# Patient Record
Sex: MALE | Race: BLACK OR AFRICAN AMERICAN | NOT HISPANIC OR LATINO | Employment: OTHER | URBAN - METROPOLITAN AREA
[De-identification: names, ages, dates, MRNs, and addresses within clinical notes are randomized per-mention and may not be internally consistent; named-entity substitution may affect disease eponyms.]

---

## 2017-01-18 PROBLEM — R20.2 NUMBNESS AND TINGLING IN LEFT HAND: Status: ACTIVE | Noted: 2017-01-18

## 2017-01-18 PROBLEM — R20.0 NUMBNESS AND TINGLING IN LEFT HAND: Status: ACTIVE | Noted: 2017-01-18

## 2017-04-20 PROBLEM — J30.2 SEASONAL ALLERGIC RHINITIS: Status: ACTIVE | Noted: 2017-04-20

## 2018-09-13 PROCEDURE — 88341 IMHCHEM/IMCYTCHM EA ADD ANTB: CPT | Performed by: PATHOLOGY

## 2018-09-13 PROCEDURE — 88342 IMHCHEM/IMCYTCHM 1ST ANTB: CPT | Performed by: PATHOLOGY

## 2018-09-13 PROCEDURE — 88365 INSITU HYBRIDIZATION (FISH): CPT | Performed by: PATHOLOGY

## 2018-09-13 PROCEDURE — 88323 CONSLTJ&REPRT MATRL PREP SLD: CPT | Performed by: PATHOLOGY

## 2018-11-05 PROBLEM — C83.30 DLBCL (DIFFUSE LARGE B CELL LYMPHOMA): Status: ACTIVE | Noted: 2018-11-05

## 2018-11-06 PROBLEM — B02.9 ZOSTER: Status: ACTIVE | Noted: 2018-11-06

## 2018-11-06 PROBLEM — B02.8 HERPES ZOSTER WITH COMPLICATION: Status: ACTIVE | Noted: 2018-11-06

## 2018-12-03 ENCOUNTER — TELEPHONE (OUTPATIENT)
Dept: HEMATOLOGY/ONCOLOGY | Facility: CLINIC | Age: 28
End: 2018-12-03

## 2018-12-03 NOTE — TELEPHONE ENCOUNTER
----- Message from Ashwini Costa RN sent at 12/3/2018  8:51 AM CST -----  Regarding: FW: needs appt abilio  This patient had an appt scheduled with Dr Garcia and had to cancel.  Can you please reschedule.      Ashwini    ----- Message -----  From: Amanda Edwards RN  Sent: 12/3/2018   8:47 AM  To: [facility] Navigation, #  Subject: needs appt abilio                                 Patient recently had to cancel an appt with bone marrow transplant due to zoster's flare up. appt has not been rescheduled as of yet. Can y'all please get him rescheduled?   tolu Patel RN     Called patient and left message tocall back to reschedule

## 2018-12-03 NOTE — TELEPHONE ENCOUNTER
Called patient left message to call the office to reschedule the consult appointment with Dr Garcia. Number was provided.        ----- Message from Ashwini Costa RN sent at 12/3/2018  8:51 AM CST -----  Regarding: FW: needs appt abilio  This patient had an appt scheduled with Dr Garcia and had to cancel.  Can you please reschedule.      Ashwini    ----- Message -----  From: Amanda Edwards RN  Sent: 12/3/2018   8:47 AM  To: [facility] Cancer Navigation, #  Subject: needs appt abilio                                 Patient recently had to cancel an appt with bone marrow transplant due to zoster's flare up. appt has not been rescheduled as of yet. Can y'all please get him rescheduled?   tolu Patel RN

## 2018-12-11 ENCOUNTER — INITIAL CONSULT (OUTPATIENT)
Dept: HEMATOLOGY/ONCOLOGY | Facility: CLINIC | Age: 28
End: 2018-12-11
Payer: MEDICARE

## 2018-12-11 VITALS
BODY MASS INDEX: 23.33 KG/M2 | HEIGHT: 70 IN | HEART RATE: 70 BPM | WEIGHT: 162.94 LBS | RESPIRATION RATE: 18 BRPM | TEMPERATURE: 98 F | OXYGEN SATURATION: 99 % | DIASTOLIC BLOOD PRESSURE: 86 MMHG | SYSTOLIC BLOOD PRESSURE: 145 MMHG

## 2018-12-11 DIAGNOSIS — C83.33 DIFFUSE LARGE B-CELL LYMPHOMA OF INTRA-ABDOMINAL LYMPH NODES: Primary | ICD-10-CM

## 2018-12-11 DIAGNOSIS — B20 AIDS: ICD-10-CM

## 2018-12-11 DIAGNOSIS — B02.8 HERPES ZOSTER WITH COMPLICATION: ICD-10-CM

## 2018-12-11 PROCEDURE — 99214 OFFICE O/P EST MOD 30 MIN: CPT | Mod: PBBFAC | Performed by: INTERNAL MEDICINE

## 2018-12-11 PROCEDURE — 99205 OFFICE O/P NEW HI 60 MIN: CPT | Mod: S$PBB,,, | Performed by: INTERNAL MEDICINE

## 2018-12-11 PROCEDURE — 99999 PR PBB SHADOW E&M-EST. PATIENT-LVL IV: CPT | Mod: PBBFAC,,, | Performed by: INTERNAL MEDICINE

## 2018-12-11 NOTE — LETTER
December 17, 2018      Mary Ann Carolina MD  [address]  [place] [address]           [facility] Hematology Oncology  [address]  [facility] [address]  Phone: [phone]          Patient: Alonzo Rajput   MR Number: [id number]   YOB: 1990   Date of Visit: 12/11/2018       Dear Dr. Mary Ann Carolina:    Thank you for referring Alonzo Rajput to me for evaluation. Attached you will find relevant portions of my assessment and plan of care.    If you have questions, please do not hesitate to call me. I look forward to following Alonzo Rajput along with you.    Sincerely,    Sarahi Garcia MD    Enclosure  CC:  No Recipients    If you would like to receive this communication electronically, please contact [email or web address] or [phone] to request more information on [employer] access.    For providers and/or their staff who would like to refer a patient to [facility], please contact us through our one-stop-shop provider referral line, [facility], at [phone].    If you feel you have received this communication in error or would no longer like to receive these types of communications, please e-mail [email or web address]

## 2018-12-11 NOTE — PROGRESS NOTES
Hematology and Medical Oncology   New Patient Consult     12/11/2018  Referred by:  Dr. Mary Ann Carolina    Primary Oncologic Diagnosis: Relapsed aggressive lymphoma    History of Present Ilness:   Alonzo Rajput (Alonzo) is a pleasant 28 y.o.male who has currently receiving salvage RICE therapy. Tomorrow would start cycle 3. He presents today to discuss auto stem cell transplant options.    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   --9/11/2018 PET/CT shows Extensive hypermetabolic adenopathy involving the neck, chest, and lower abdomen/pelvis with index lesions detailed above most suggestive of lymphoma.  Also increased activity involving adenoidal and palatine tonsillar tissues may be reactive or could indicate lymphoma involvement.  There is diffuse mild increased activity within the spleen.   --BM biopsy 9/12/2018 negative for disease  --Lymph node, left inguinal, excision on 9/13/2018  --RICE x 3 has been complicated with disseminated shingles, now recovered.    PAST MEDICAL HISTORY:   Past Medical History:   Diagnosis Date    Allergy     Bronchitis     Cancer     lymphoma    Candida esophagitis     Diffuse large B cell lymphoma     DLBCL (diffuse large B cell lymphoma) 11/5/2018    Ear infection     Encounter for blood transfusion     Fever blister     Hepatitis B     HIV (human immunodeficiency virus infection)     UGI bleed        PAST SURGICAL HISTORY:   Past Surgical History:   Procedure Laterality Date    BIOPSY, LYMPH NODE Left 9/13/2018    Performed [facility]yes[facility] at [facility] OR    EGD for staging of response to chemotherapy for  DLBCL stomach N/A 8/11/2016    Performed by Luis [facility]Reyes, MD at [facility] ENDO    ESOPHAGOGASTRODUODENOSCOPY      ESOPHAGOGASTRODUODENOSCOPY (EGD) N/A 9/23/2015    Performed by Marshall Valentine MD at [facility] ENDO    INSERTION OF TUNNELED CENTRAL VENOUS CATHETER (CVC) WITH SUBCUTANEOUS PORT Left 9/13/2018    Procedure: [id number]-C-CATH;  Surgeon: Luis Bogran-Reyes, MD;  Location: [facility];  Service: General;  Laterality: Left;    CDVSGYWEM-FAFL-B-CATH Left 9/13/2018    Performed by Luis [facility]Reyes, MD at [facility] OR    [id number]-YDZS-Z-CATH Left 10/14/2015    Performed by Leno Hernandes MD at [facility] OR    LYMPH NODE BIOPSY Left 9/13/2018    Procedure: BIOPSY, LYMPH NODE;  Surgeon: Luis Bogran-Reyes, MD;  Location: [facility];  Service: General;  Laterality: Left;  inguinal lymph node excisional biopsy    port a cath removal      PORTACATH PLACEMENT      UPPER GASTROINTESTINAL ENDOSCOPY         PAST SOCIAL HISTORY:   reports that he quit smoking about 3 years ago. His smoking use included cigarettes. He quit after 2.00 years of use. he has never used smokeless tobacco. He reports that he drinks alcohol. He reports that he uses drugs. Drug: Marijuana.    FAMILY HISTORY:  Family History   Problem Relation Age of Onset    No Known Problems Mother     No Known Problems Father     No Known Problems Sister     No Known Problems Brother     Lupus Sister     No Known Problems Sister     No Known Problems Brother     No Known Problems Brother        CURRENT MEDICATIONS:   Current Outpatient Medications   Medication Sig    abacavir-dolutegravir-lamivud (TRIUMEQ) 600- mg Tab Take 1 tablet by mouth once daily.    acyclovir (ZOVIRAX) 400 MG tablet Take 1 tablet (400 mg total) by mouth 2 (two) times daily.    allopurinol (ZYLOPRIM) 300 MG tablet Take 1 tablet (300 mg total) by mouth once daily.    cetirizine (ZYRTEC) 10 MG tablet TAKE 1 TABLET BY MOUTH ONCE DAILY    citalopram (CELEXA) 20 MG tablet Take 1  tablet (20 mg total) by mouth once daily.    desonide (DESOWEN) 0.05 % cream Apply topically 2 (two) times daily. To ears, face and flexures BID for two weeks, then prn severe flares only for 10 days    dexamethasone (DECADRON) 4 MG Tab Take 1 tablet (4 mg total) by mouth every 6 (six) hours. Take with 2 Reglan pills as needed for nausea.    diphenhydrAMINE (BENADRYL) 25 mg capsule Take 1 each (25 mg total) by mouth every 6 (six) hours as needed for Allergies (sinus congestion).    metoclopramide HCl (REGLAN) 10 MG tablet Take 2 tablets (20 mg total) by mouth 4 (four) times daily before meals and nightly.    omeprazole (PRILOSEC) 20 MG capsule Take 1 capsule (20 mg total) by mouth once daily.    ondansetron (ZOFRAN) 4 MG tablet Take 1 tablet (4 mg total) by mouth every 6 (six) hours as needed for Nausea.    prochlorperazine (COMPAZINE) 10 MG tablet Take 1 tablet (10 mg total) by mouth 4 (four) times daily as needed.    sulfamethoxazole-trimethoprim 800-160mg (BACTRIM DS) 800-160 mg Tab Take 1 tablet by mouth once daily.    traZODone (DESYREL) 100 MG tablet Take one half hs for one week then one hs (Patient taking differently: Take 100 mg by mouth every evening. Take one half hs for one week then one hs)     No current facility-administered medications for this visit.      ALLERGIES:   Review of patient's allergies indicates:   Allergen Reactions    Amoxicillin Swelling         Review of Systems:     Review of Systems   Constitutional: Negative for appetite change, chills, diaphoresis, fatigue, fever and unexpected weight change.   HENT:   Negative for hearing loss, mouth sores, nosebleeds, sore throat, trouble swallowing and voice change.    Eyes: Negative for eye problems and icterus.   Respiratory: Negative for chest tightness, cough, hemoptysis, shortness of breath and wheezing.    Cardiovascular: Negative for chest pain, leg swelling and palpitations.   Gastrointestinal: Negative for abdominal  distention, abdominal pain, blood in stool, diarrhea, nausea and vomiting.   Endocrine: Negative for hot flashes.   Genitourinary: Negative for bladder incontinence, difficulty urinating, dysuria and hematuria.    Musculoskeletal: Negative for arthralgias, back pain, flank pain, gait problem, myalgias, neck pain and neck stiffness.   Skin: Negative for itching, rash and wound.   Neurological: Negative for dizziness, extremity weakness, gait problem, headaches, numbness, seizures and speech difficulty.   Hematological: Negative for adenopathy. Does not bruise/bleed easily.   Psychiatric/Behavioral: Negative for confusion, depression and sleep disturbance. The patient is not nervous/anxious.           Physical Exam:     Vitals:    12/11/18 1426   BP: (!) 145/86   Pulse: 70   Resp: 18   Temp: 97.9 °F (36.6 °C)       Physical Exam   Constitutional: He is oriented to person, place, and time. He appears well-developed and well-nourished. No distress.   HENT:   Head: Normocephalic and atraumatic.   Mouth/Throat: Oropharynx is clear and moist. No oropharyngeal exudate.   Eyes: Conjunctivae and EOM are normal. Pupils are equal, round, and reactive to light.   Neck: Normal range of motion. Neck supple. No JVD present. No tracheal deviation present. No thyromegaly present.   Cardiovascular: Normal rate, regular rhythm and normal heart sounds. Exam reveals no friction rub.   No murmur heard.  Pulmonary/Chest: Effort normal and breath sounds normal. No stridor. No respiratory distress. He has no wheezes. He has no rales. He exhibits no tenderness.   Abdominal: Soft. Bowel sounds are normal. He exhibits no distension. There is no tenderness. There is no rebound and no guarding.   Musculoskeletal: Normal range of motion. He exhibits no edema, tenderness or deformity.   Lymphadenopathy:     He has no axillary adenopathy.   Neurological: He is alert and oriented to person, place, and time. He displays normal reflexes. No cranial  nerve deficit or sensory deficit. He exhibits normal muscle tone. Coordination normal.   Skin: Skin is warm and dry. Capillary refill takes less than 2 seconds. No rash noted. He is not diaphoretic. No erythema. No pallor.   Psychiatric: He has a normal mood and affect. His behavior is normal. Judgment and thought content normal.       ECOG Performance Status: (foot note - ECOG PS provided by Eastern Cooperative Oncology Group) 0 - Asymptomatic    Karnofsky Performance Score:  100%- Normal, No Complaints, No Evidence of Disease    Labs:   Lab Results   Component Value Date    WBC 13.94 (H) 12/14/2018    HGB 9.3 (L) 12/14/2018    HCT 27.7 (L) 12/14/2018    PLT 87 (L) 12/14/2018    CHOL 83 (L) 11/20/2017    TRIG 60 11/20/2017    HDL 25 (L) 11/20/2017    ALT 68 (H) 12/14/2018    AST 51 (H) 12/14/2018     12/14/2018    K 3.5 12/14/2018     12/14/2018    CREATININE 0.9 12/14/2018    BUN 13 12/14/2018    CO2 27 12/14/2018    TSH 1.136 09/23/2015    INR 1.1 11/05/2018    HGBA1C 5.2 11/29/2018         Imaging: Previous imaging has been reviewed    Assessment and Plan:     Mr. 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 Villa at [facility]  --He understands that he would need to live in the [place] area for roughly 30 days following ASCT, he has a care giver and this is not an issue      HIV  --Currently on Triumeq with good disease control  --Previously on and off HARRT therapy, we discussed the importance of medication adherence      60 minutes were spent face  to face with the patient to discuss the disease, natural history, treatment options and survival statistics. I have provided the patient with an opportunity to ask questions and have all questions answered to his satisfaction.       he will return to clinic as per the transplant coordinator's schedule, but knows to call in the interim if symptoms change or should a problem arise.        Sarahi Garcia MD  Hematology and Medical Oncology  Bone Marrow Transplant  [facility]

## 2018-12-12 ENCOUNTER — TELEPHONE (OUTPATIENT)
Dept: HEMATOLOGY/ONCOLOGY | Facility: CLINIC | Age: 28
End: 2018-12-12

## 2018-12-12 NOTE — TELEPHONE ENCOUNTER
spoke with pt on today in regards to upcoming appointment on Saturdays for pet scan, pt is aware and has confirm.

## 2018-12-14 ENCOUNTER — TELEPHONE (OUTPATIENT)
Dept: HEMATOLOGY/ONCOLOGY | Facility: CLINIC | Age: 28
End: 2018-12-14

## 2018-12-14 NOTE — TELEPHONE ENCOUNTER
Called Alonzo and gave him the phone # for [facility].  I told him that I faxed over the clearance letter and told them to call me.  All questions were answered and patient verbalized understanding.  Nahid MPH, MT (ASCP).

## 2018-12-15 ENCOUNTER — HOSPITAL ENCOUNTER (OUTPATIENT)
Dept: RADIOLOGY | Facility: HOSPITAL | Age: 28
Discharge: HOME | End: 2018-12-15
Attending: INTERNAL MEDICINE
Payer: MEDICARE

## 2018-12-15 DIAGNOSIS — C83.33 DIFFUSE LARGE B-CELL LYMPHOMA OF INTRA-ABDOMINAL LYMPH NODES: ICD-10-CM

## 2018-12-15 LAB — POCT GLUCOSE: 88 MG/DL (ref 70–110)

## 2018-12-15 PROCEDURE — 78815 PET IMAGE W/CT SKULL-THIGH: CPT | Mod: 26,PS,, | Performed by: RADIOLOGY

## 2018-12-15 PROCEDURE — A9552 F18 FDG: HCPCS

## 2018-12-15 PROCEDURE — 78815 PET IMAGE W/CT SKULL-THIGH: CPT | Mod: TC

## 2018-12-17 PROBLEM — Z86.19 HISTORY OF HERPES ZOSTER: Status: ACTIVE | Noted: 2018-12-17

## 2018-12-18 ENCOUNTER — PATIENT MESSAGE (OUTPATIENT)
Dept: HEMATOLOGY/ONCOLOGY | Facility: CLINIC | Age: 28
End: 2018-12-18

## 2018-12-18 ENCOUNTER — TELEPHONE (OUTPATIENT)
Dept: HEMATOLOGY/ONCOLOGY | Facility: CLINIC | Age: 28
End: 2018-12-18

## 2019-01-14 ENCOUNTER — TELEPHONE (OUTPATIENT)
Dept: HEMATOLOGY/ONCOLOGY | Facility: CLINIC | Age: 29
End: 2019-01-14

## 2019-01-15 ENCOUNTER — TELEPHONE (OUTPATIENT)
Dept: HEMATOLOGY/ONCOLOGY | Facility: CLINIC | Age: 29
End: 2019-01-15

## 2019-01-16 ENCOUNTER — TELEPHONE (OUTPATIENT)
Dept: HEMATOLOGY/ONCOLOGY | Facility: CLINIC | Age: 29
End: 2019-01-16

## 2019-01-24 ENCOUNTER — TELEPHONE (OUTPATIENT)
Dept: HEMATOLOGY/ONCOLOGY | Facility: CLINIC | Age: 29
End: 2019-01-24

## 2019-01-24 NOTE — TELEPHONE ENCOUNTER
[facility] has called the patient and he has an appt for 2/6.  I told Alonzo to call me after his visit.  All questions were answered and patient verbalized understanding.  Nahid MPH, MT(ASCP).

## 2019-02-08 ENCOUNTER — TELEPHONE (OUTPATIENT)
Dept: HEMATOLOGY/ONCOLOGY | Facility: CLINIC | Age: 29
End: 2019-02-08

## 2019-02-11 ENCOUNTER — TELEPHONE (OUTPATIENT)
Dept: HEMATOLOGY/ONCOLOGY | Facility: CLINIC | Age: 29
End: 2019-02-11

## 2019-02-11 NOTE — TELEPHONE ENCOUNTER
Spoke to Alonzo this morning.  He stated that he went to the dentist on 2/6 and needs to have 7 teeth pulled which is scheduled for 2/19 at [facility].  He said he will have partial plates made after that.  I told him to call me after his dental appt to see how he made out and how long they want him to recover.  All questions were answered and patient verbalized understanding.  Nahid, MPH, MT(ASCP)

## 2019-03-01 ENCOUNTER — TELEPHONE (OUTPATIENT)
Dept: HEMATOLOGY/ONCOLOGY | Facility: CLINIC | Age: 29
End: 2019-03-01

## 2019-03-01 NOTE — TELEPHONE ENCOUNTER
Spoke to Alonzo and he will be going to his dentist on 3/6 for clearance.  I told him to give me a call after and we will get him set up to see Dr. Garcia.  Nahid, MPH, MT(ASCP).

## 2019-03-07 ENCOUNTER — TELEPHONE (OUTPATIENT)
Dept: HEMATOLOGY/ONCOLOGY | Facility: CLINIC | Age: 29
End: 2019-03-07

## 2019-03-11 ENCOUNTER — TELEPHONE (OUTPATIENT)
Dept: HEMATOLOGY/ONCOLOGY | Facility: CLINIC | Age: 29
End: 2019-03-11

## 2019-03-18 PROBLEM — B20: Status: ACTIVE | Noted: 2018-12-17

## 2019-03-18 PROBLEM — B02.9 ZOSTER: Status: RESOLVED | Noted: 2018-11-06 | Resolved: 2019-03-18

## 2019-03-18 PROBLEM — Z86.19 HISTORY OF HERPES ZOSTER: Status: RESOLVED | Noted: 2018-12-17 | Resolved: 2019-03-18

## 2019-03-19 ENCOUNTER — TELEPHONE (OUTPATIENT)
Dept: HEMATOLOGY/ONCOLOGY | Facility: CLINIC | Age: 29
End: 2019-03-19

## 2019-03-19 NOTE — TELEPHONE ENCOUNTER
Spoke to Alonzo and he is returning to the dentist today.  I will resend the fax with the letter to [facility].  I told him that he needs an appt with Dr. Garcia.  He said he was going to make one.  All questions were answered and patient verbalized understanding.  Nahid, MPH, MT (ASCP)

## 2019-03-20 ENCOUNTER — TELEPHONE (OUTPATIENT)
Dept: HEMATOLOGY/ONCOLOGY | Facility: CLINIC | Age: 29
End: 2019-03-20

## 2019-03-25 ENCOUNTER — TELEPHONE (OUTPATIENT)
Dept: HEMATOLOGY/ONCOLOGY | Facility: CLINIC | Age: 29
End: 2019-03-25

## 2019-03-25 DIAGNOSIS — C83.30 DIFFUSE LARGE B-CELL LYMPHOMA, UNSPECIFIED BODY REGION: Primary | ICD-10-CM

## 2019-03-26 ENCOUNTER — TELEPHONE (OUTPATIENT)
Dept: HEMATOLOGY/ONCOLOGY | Facility: CLINIC | Age: 29
End: 2019-03-26

## 2019-04-05 ENCOUNTER — OFFICE VISIT (OUTPATIENT)
Dept: HEMATOLOGY/ONCOLOGY | Facility: CLINIC | Age: 29
End: 2019-04-05
Payer: MEDICARE

## 2019-04-05 ENCOUNTER — LAB VISIT (OUTPATIENT)
Dept: LAB | Facility: HOSPITAL | Age: 29
End: 2019-04-05
Attending: INTERNAL MEDICINE
Payer: MEDICARE

## 2019-04-05 VITALS
TEMPERATURE: 99 F | SYSTOLIC BLOOD PRESSURE: 143 MMHG | BODY MASS INDEX: 26.12 KG/M2 | WEIGHT: 176.38 LBS | DIASTOLIC BLOOD PRESSURE: 73 MMHG | HEIGHT: 69 IN | HEART RATE: 61 BPM

## 2019-04-05 DIAGNOSIS — C83.30 DIFFUSE LARGE B-CELL LYMPHOMA, UNSPECIFIED BODY REGION: ICD-10-CM

## 2019-04-05 DIAGNOSIS — B20 HISTORY OF AIDS WITH HERPES ZOSTER: ICD-10-CM

## 2019-04-05 DIAGNOSIS — Z86.19 HISTORY OF AIDS WITH HERPES ZOSTER: ICD-10-CM

## 2019-04-05 DIAGNOSIS — C83.33 DIFFUSE LARGE B-CELL LYMPHOMA OF INTRA-ABDOMINAL LYMPH NODES: Primary | ICD-10-CM

## 2019-04-05 LAB
ALBUMIN SERPL BCP-MCNC: 3.9 G/DL (ref 3.5–5.2)
ALP SERPL-CCNC: 110 U/L (ref 55–135)
ALT SERPL W/O P-5'-P-CCNC: 34 U/L (ref 10–44)
ANION GAP SERPL CALC-SCNC: 9 MMOL/L (ref 8–16)
AST SERPL-CCNC: 34 U/L (ref 10–40)
BASOPHILS # BLD AUTO: 0.02 K/UL (ref 0–0.2)
BASOPHILS NFR BLD: 0.5 % (ref 0–1.9)
BILIRUB SERPL-MCNC: 0.2 MG/DL (ref 0.1–1)
BUN SERPL-MCNC: 17 MG/DL (ref 6–20)
CALCIUM SERPL-MCNC: 9.8 MG/DL (ref 8.7–10.5)
CHLORIDE SERPL-SCNC: 102 MMOL/L (ref 95–110)
CO2 SERPL-SCNC: 24 MMOL/L (ref 23–29)
CREAT SERPL-MCNC: 1.2 MG/DL (ref 0.5–1.4)
DIFFERENTIAL METHOD: ABNORMAL
EOSINOPHIL # BLD AUTO: 0.2 K/UL (ref 0–0.5)
EOSINOPHIL NFR BLD: 5.1 % (ref 0–8)
ERYTHROCYTE [DISTWIDTH] IN BLOOD BY AUTOMATED COUNT: 12.2 % (ref 11.5–14.5)
EST. GFR  (AFRICAN AMERICAN): >60 ML/MIN/1.73 M^2
EST. GFR  (NON AFRICAN AMERICAN): >60 ML/MIN/1.73 M^2
GLUCOSE SERPL-MCNC: 69 MG/DL (ref 70–110)
HCT VFR BLD AUTO: 43.6 % (ref 40–54)
HGB BLD-MCNC: 14.2 G/DL (ref 14–18)
IMM GRANULOCYTES # BLD AUTO: 0.01 K/UL (ref 0–0.04)
IMM GRANULOCYTES NFR BLD AUTO: 0.2 % (ref 0–0.5)
LDH SERPL L TO P-CCNC: 198 U/L (ref 110–260)
LYMPHOCYTES # BLD AUTO: 1.8 K/UL (ref 1–4.8)
LYMPHOCYTES NFR BLD: 40.5 % (ref 18–48)
MCH RBC QN AUTO: 31.5 PG (ref 27–31)
MCHC RBC AUTO-ENTMCNC: 32.6 G/DL (ref 32–36)
MCV RBC AUTO: 97 FL (ref 82–98)
MONOCYTES # BLD AUTO: 0.4 K/UL (ref 0.3–1)
MONOCYTES NFR BLD: 9.7 % (ref 4–15)
NEUTROPHILS # BLD AUTO: 1.9 K/UL (ref 1.8–7.7)
NEUTROPHILS NFR BLD: 44 % (ref 38–73)
NRBC BLD-RTO: 0 /100 WBC
PLATELET # BLD AUTO: 276 K/UL (ref 150–350)
PMV BLD AUTO: 9.4 FL (ref 9.2–12.9)
POTASSIUM SERPL-SCNC: 4.2 MMOL/L (ref 3.5–5.1)
PROT SERPL-MCNC: 9.1 G/DL (ref 6–8.4)
RBC # BLD AUTO: 4.51 M/UL (ref 4.6–6.2)
SODIUM SERPL-SCNC: 135 MMOL/L (ref 136–145)
WBC # BLD AUTO: 4.32 K/UL (ref 3.9–12.7)

## 2019-04-05 PROCEDURE — 80053 COMPREHEN METABOLIC PANEL: CPT

## 2019-04-05 PROCEDURE — 99213 OFFICE O/P EST LOW 20 MIN: CPT | Mod: PBBFAC | Performed by: INTERNAL MEDICINE

## 2019-04-05 PROCEDURE — 85025 COMPLETE CBC W/AUTO DIFF WBC: CPT

## 2019-04-05 PROCEDURE — 36415 COLL VENOUS BLD VENIPUNCTURE: CPT

## 2019-04-05 PROCEDURE — 99999 PR PBB SHADOW E&M-EST. PATIENT-LVL III: ICD-10-PCS | Mod: PBBFAC,,, | Performed by: INTERNAL MEDICINE

## 2019-04-05 PROCEDURE — 83615 LACTATE (LD) (LDH) ENZYME: CPT

## 2019-04-05 PROCEDURE — 99215 OFFICE O/P EST HI 40 MIN: CPT | Mod: S$PBB,,, | Performed by: INTERNAL MEDICINE

## 2019-04-05 PROCEDURE — 99215 PR OFFICE/OUTPT VISIT, EST, LEVL V, 40-54 MIN: ICD-10-PCS | Mod: S$PBB,,, | Performed by: INTERNAL MEDICINE

## 2019-04-05 PROCEDURE — 99999 PR PBB SHADOW E&M-EST. PATIENT-LVL III: CPT | Mod: PBBFAC,,, | Performed by: INTERNAL MEDICINE

## 2019-04-05 NOTE — PROGRESS NOTES
Hematology and Medical Oncology   Follow Up     04/05/2019    Primary Oncologic Diagnosis: Relapsed aggressive lymphoma    History of Present Ilness:   Alonzo Rajput (Alonzo) is a pleasant 28 y.o.male who has currently receiving salvage RICE therapy. Completed cycle 4 at the end of December 2018. He presents today to discuss auto stem cell transplant options.    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   --9/11/2018 PET/CT shows Extensive hypermetabolic adenopathy involving the neck, chest, and lower abdomen/pelvis with index lesions detailed above most suggestive of lymphoma.  Also increased activity involving adenoidal and palatine tonsillar tissues may be reactive or could indicate lymphoma involvement.  There is diffuse mild increased activity within the spleen.   --BM biopsy 9/12/2018 negative for disease  --Lymph node, left inguinal, excision on 9/13/2018  --RICE x 3 has been complicated with disseminated shingles, now recovered.  --PET 12/15/19 Abnormal focus of increased radiotracer uptake identified in the left submandibular gland, SUV max 6.69  --Cycle 4 of Rice on 12/26/18    Interval History:  Mr. Rajput has been lost to follow up for roughly 3 months pending dental clearance. He has since had 6 teeth pulled and have 3 questionable teeth. He has felt well and resumed all normal activity. He specifically denies lad, drenching night sweats or unintentional weight loss.    PAST MEDICAL HISTORY:   Past Medical History:   Diagnosis Date    Allergy     Bronchitis     Cancer     lymphoma    Candida esophagitis     Diffuse large B cell lymphoma     DLBCL  (diffuse large B cell lymphoma) 11/5/2018    Ear infection     Encounter for blood transfusion     Fever blister     Hepatitis B     HIV (human immunodeficiency virus infection)     UGI bleed        PAST SURGICAL HISTORY:   Past Surgical History:   Procedure Laterality Date    BIOPSY, LYMPH NODE Left 9/13/2018    Per[facility], [facility] OR    EGD for staging of response to chemotherapy for DLBCL stomach N/A 8/11/2016    Performed by [facility], [facility] at [facility] ENDO    ESOPHAGOGASTRODUODENOSCOPY      ESOPHAGOGASTRODUODENOSCOPY (EGD) N/A 9/23/2015    Performed by Marshall [facility] [place] ENDO    SYWTUIEZC-YWZS-Z-CATH Left 9/13/2018    Performed [facility], [facility] at [facility] OR    OTMTCQIYL-WGWN-E-CATH Left 10/14/2015    Performed by Leno Hernandes MD at [facility] OR    port a cath removal      PORTACATH PLACEMENT      UPPER GASTROINTESTINAL ENDOSCOPY         PAST SOCIAL HISTORY:   reports that he quit smoking about 3 years ago. His smoking use included cigarettes. He quit after 2.00 years of use. He has never used smokeless tobacco. He reports that he drinks alcohol. He reports that he has current or past drug history. Drug: Marijuana.    FAMILY HISTORY:  Family History   Problem Relation Age of Onset    No Known Problems Mother     No Known Problems Father     No Known Problems Sister     No Known Problems Brother     Lupus Sister     No Known Problems Sister     No Known Problems Brother     No Known Problems Brother        CURRENT MEDICATIONS:   Current Outpatient Medications   Medication Sig    abacavir-dolutegravir-lamivud (TRIUMEQ) 600- mg Tab Take 1 tablet by mouth once daily.    acyclovir (ZOVIRAX) 400 MG tablet Take 1 tablet (400 mg total) by mouth 2 (two) times daily.    allopurinol (ZYLOPRIM) 300 MG tablet Take 1 tablet (300 mg total) by mouth once daily.    cetirizine (ZYRTEC) 10 MG tablet TAKE 1 TABLET BY MOUTH ONCE DAILY    citalopram (CELEXA) 20  MG tablet Take 1 tablet (20 mg total) by mouth once daily.    desonide (DESOWEN) 0.05 % cream Apply topically 2 (two) times daily. To ears, face and flexures BID for two weeks, then prn severe flares only for 10 days    diphenhydrAMINE (BENADRYL) 25 mg capsule Take 1 each (25 mg total) by mouth every 6 (six) hours as needed for Allergies (sinus congestion).    omeprazole (PRILOSEC) 20 MG capsule Take 1 capsule (20 mg total) by mouth once daily.    ondansetron (ZOFRAN) 4 MG tablet Take 1 tablet (4 mg total) by mouth every 6 (six) hours as needed for Nausea.    traZODone (DESYREL) 100 MG tablet Take one half hs for one week then one hs (Patient taking differently: Take 100 mg by mouth every evening. Take one half hs for one week then one hs)    triamcinolone acetonide 0.1% (KENALOG) 0.1 % cream Apply a thin layer to the arms avoid face, neck, axillas and groins twice daily. Limit 2 weeks at time     No current facility-administered medications for this visit.      ALLERGIES:   Review of patient's allergies indicates:   Allergen Reactions    Amoxicillin Swelling         Review of Systems:     Review of Systems   Constitutional: Negative for appetite change, chills, diaphoresis, fatigue, fever and unexpected weight change.   HENT:   Negative for hearing loss, mouth sores, nosebleeds, sore throat, trouble swallowing and voice change.    Eyes: Negative for eye problems and icterus.   Respiratory: Negative for chest tightness, cough, hemoptysis, shortness of breath and wheezing.    Cardiovascular: Negative for chest pain, leg swelling and palpitations.   Gastrointestinal: Negative for abdominal distention, abdominal pain, blood in stool, diarrhea, nausea and vomiting.   Endocrine: Negative for hot flashes.   Genitourinary: Negative for bladder incontinence, difficulty urinating, dysuria, frequency and hematuria.    Musculoskeletal: Negative for arthralgias, back pain, flank pain, gait problem, myalgias, neck pain  and neck stiffness.   Skin: Negative for itching, rash and wound.   Neurological: Negative for dizziness, extremity weakness, gait problem, headaches, numbness, seizures and speech difficulty.   Hematological: Negative for adenopathy. Does not bruise/bleed easily.   Psychiatric/Behavioral: Negative for confusion, depression and sleep disturbance. The patient is not nervous/anxious.           Physical Exam:     Vitals:    04/05/19 1504   BP: (!) 143/73   Pulse: 61   Temp: 98.7 °F (37.1 °C)       Physical Exam   Constitutional: He is oriented to person, place, and time. He appears well-developed and well-nourished. No distress.   HENT:   Head: Normocephalic and atraumatic.   Mouth/Throat: Oropharynx is clear and moist. No oropharyngeal exudate.   Eyes: Pupils are equal, round, and reactive to light. Conjunctivae and EOM are normal.   Neck: Normal range of motion. Neck supple. No JVD present. No tracheal deviation present. No thyromegaly present.   Cardiovascular: Normal rate, regular rhythm and normal heart sounds. Exam reveals no friction rub.   No murmur heard.  Pulmonary/Chest: Effort normal and breath sounds normal. No stridor. No respiratory distress. He has no wheezes. He has no rales. He exhibits no tenderness.   Abdominal: Soft. Bowel sounds are normal. He exhibits no distension. There is no tenderness. There is no rebound and no guarding.   Musculoskeletal: Normal range of motion. He exhibits no edema, tenderness or deformity.   Lymphadenopathy:     He has no axillary adenopathy.   Neurological: He is alert and oriented to person, place, and time. He displays normal reflexes. No cranial nerve deficit or sensory deficit. He exhibits normal muscle tone. Coordination normal.   Skin: Skin is warm and dry. Capillary refill takes less than 2 seconds. No rash noted. He is not diaphoretic. No erythema. No pallor.   Psychiatric: He has a normal mood and affect. His behavior is normal. Judgment and thought content  normal.       ECOG Performance Status: (foot note - ECOG PS provided by Eastern Cooperative Oncology Group) 0 - Asymptomatic    Karnofsky Performance Score:  100%- Normal, No Complaints, No Evidence of Disease    Labs:   Lab Results   Component Value Date    WBC 4.32 04/05/2019    HGB 14.2 04/05/2019    HCT 43.6 04/05/2019     04/05/2019    CHOL 157 03/14/2019    TRIG 263 (H) 03/14/2019    HDL 35 (L) 03/14/2019    ALT 34 04/05/2019    AST 34 04/05/2019     (L) 04/05/2019    K 4.2 04/05/2019     04/05/2019    CREATININE 1.2 04/05/2019    BUN 17 04/05/2019    CO2 24 04/05/2019    TSH 1.136 09/23/2015    INR 1.1 11/05/2018    HGBA1C 5.2 11/29/2018         Imaging: Previous imaging has been reviewed    Assessment and Plan:     Mr. 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  --Initial pathology in 2015 was most consistent with Burkitt's Lymphoma  --Received cycle 4 of RICE in the end of December 2018  --Prolonged dental clearance, but 6 teeth are now pulled  --Will arrange for a PET in the next week to assess current disease response  --We discussed if he is still in a CR there is benefit to a ASCT at this point to help prevent relapse  -- ASCT evaluation is dependent on current disease status    --He understands that he would need to live in the [place] area for roughly 30 days following ASCT, he has a care giver and this is not an issue      HIV  --Currently on Triumeq with good disease control  --Previously on and off HARRT therapy, we discussed the importance of medication adherence      30 minutes were spent face to face with the patient to discuss the disease, natural history, treatment options and survival statistics. I have provided the patient with an opportunity to ask questions and have all questions answered to his satisfaction.       he will return to clinic as per the  transplant coordinator's schedule, but knows to call in the interim if symptoms change or should a problem arise.        Sarahi Garcia MD  Hematology and Medical Oncology  Bone Marrow Transplant  [facility]

## 2019-04-05 NOTE — H&P (VIEW-ONLY)
Hematology and Medical Oncology   Follow Up     04/05/2019    Primary Oncologic Diagnosis: Relapsed aggressive lymphoma    History of Present Ilness:   Alonzo Rajput (Alonzo) is a pleasant 28 y.o.male who has currently receiving salvage RICE therapy. Completed cycle 4 at the end of December 2018. He presents today to discuss auto stem cell transplant options.    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   --9/11/2018 PET/CT shows Extensive hypermetabolic adenopathy involving the neck, chest, and lower abdomen/pelvis with index lesions detailed above most suggestive of lymphoma.  Also increased activity involving adenoidal and palatine tonsillar tissues may be reactive or could indicate lymphoma involvement.  There is diffuse mild increased activity within the spleen.   --BM biopsy 9/12/2018 negative for disease  --Lymph node, left inguinal, excision on 9/13/2018  --RICE x 3 has been complicated with disseminated shingles, now recovered.  --PET 12/15/19 Abnormal focus of increased radiotracer uptake identified in the left submandibular gland, SUV max 6.69  --Cycle 4 of Rice on 12/26/18    Interval History:  Mr. Rajput has been lost to follow up for roughly 3 months pending dental clearance. He has since had 6 teeth pulled and have 3 questionable teeth. He has felt well and resumed all normal activity. He specifically denies lad, drenching night sweats or unintentional weight loss.    PAST MEDICAL HISTORY:   Past Medical History:   Diagnosis Date    Allergy     Bronchitis     Cancer     lymphoma    Candida esophagitis     Diffuse large B cell lymphoma     DLBCL  (diffuse large B cell lymphoma) 11/5/2018    Ear infection     Encounter for blood transfusion     Fever blister     Hepatitis B     HIV (human immunodeficiency virus infection)     UGI bleed        PAST SURGICAL HISTORY:   Past Surgical History:   Procedure Laterality Date    BIOPSY, LYMPH NODE Left 9/13/2018    [facility]    EGD for staging of response to chemotherapy for DLBCL stomach N/A 8/11/2016    Performed by [facility], [facility] at [facility] ENDO    ESOPHAGOGASTRODUODENOSCOPY      ESOPHAGOGASTRODUODENOSCOPY (EGD) N/A 9/23/2015    [facility] ENDO    [id number]-LTXP-H-CATH Left 9/13/2018    Performed [facility], [facility] at [facility] OR    PLCVGAHNL-TPNZ-I-CATH Left 10/14/2015    Performed by Leno Hernandes MD at [facility] OR    port a cath removal      PORTACATH PLACEMENT      UPPER GASTROINTESTINAL ENDOSCOPY         PAST SOCIAL HISTORY:   reports that he quit smoking about 3 years ago. His smoking use included cigarettes. He quit after 2.00 years of use. He has never used smokeless tobacco. He reports that he drinks alcohol. He reports that he has current or past drug history. Drug: Marijuana.    FAMILY HISTORY:  Family History   Problem Relation Age of Onset    No Known Problems Mother     No Known Problems Father     No Known Problems Sister     No Known Problems Brother     Lupus Sister     No Known Problems Sister     No Known Problems Brother     No Known Problems Brother        CURRENT MEDICATIONS:   Current Outpatient Medications   Medication Sig    abacavir-dolutegravir-lamivud (TRIUMEQ) 600- mg Tab Take 1 tablet by mouth once daily.    acyclovir (ZOVIRAX) 400 MG tablet Take 1 tablet (400 mg total) by mouth 2 (two) times daily.    allopurinol (ZYLOPRIM) 300 MG tablet Take 1 tablet (300 mg total) by mouth once daily.    cetirizine (ZYRTEC) 10 MG tablet TAKE 1 TABLET BY MOUTH ONCE DAILY    citalopram (CELEXA) 20  MG tablet Take 1 tablet (20 mg total) by mouth once daily.    desonide (DESOWEN) 0.05 % cream Apply topically 2 (two) times daily. To ears, face and flexures BID for two weeks, then prn severe flares only for 10 days    diphenhydrAMINE (BENADRYL) 25 mg capsule Take 1 each (25 mg total) by mouth every 6 (six) hours as needed for Allergies (sinus congestion).    omeprazole (PRILOSEC) 20 MG capsule Take 1 capsule (20 mg total) by mouth once daily.    ondansetron (ZOFRAN) 4 MG tablet Take 1 tablet (4 mg total) by mouth every 6 (six) hours as needed for Nausea.    traZODone (DESYREL) 100 MG tablet Take one half hs for one week then one hs (Patient taking differently: Take 100 mg by mouth every evening. Take one half hs for one week then one hs)    triamcinolone acetonide 0.1% (KENALOG) 0.1 % cream Apply a thin layer to the arms avoid face, neck, axillas and groins twice daily. Limit 2 weeks at time     No current facility-administered medications for this visit.      ALLERGIES:   Review of patient's allergies indicates:   Allergen Reactions    Amoxicillin Swelling         Review of Systems:     Review of Systems   Constitutional: Negative for appetite change, chills, diaphoresis, fatigue, fever and unexpected weight change.   HENT:   Negative for hearing loss, mouth sores, nosebleeds, sore throat, trouble swallowing and voice change.    Eyes: Negative for eye problems and icterus.   Respiratory: Negative for chest tightness, cough, hemoptysis, shortness of breath and wheezing.    Cardiovascular: Negative for chest pain, leg swelling and palpitations.   Gastrointestinal: Negative for abdominal distention, abdominal pain, blood in stool, diarrhea, nausea and vomiting.   Endocrine: Negative for hot flashes.   Genitourinary: Negative for bladder incontinence, difficulty urinating, dysuria, frequency and hematuria.    Musculoskeletal: Negative for arthralgias, back pain, flank pain, gait problem, myalgias, neck pain  and neck stiffness.   Skin: Negative for itching, rash and wound.   Neurological: Negative for dizziness, extremity weakness, gait problem, headaches, numbness, seizures and speech difficulty.   Hematological: Negative for adenopathy. Does not bruise/bleed easily.   Psychiatric/Behavioral: Negative for confusion, depression and sleep disturbance. The patient is not nervous/anxious.           Physical Exam:     Vitals:    04/05/19 1504   BP: (!) 143/73   Pulse: 61   Temp: 98.7 °F (37.1 °C)       Physical Exam   Constitutional: He is oriented to person, place, and time. He appears well-developed and well-nourished. No distress.   HENT:   Head: Normocephalic and atraumatic.   Mouth/Throat: Oropharynx is clear and moist. No oropharyngeal exudate.   Eyes: Pupils are equal, round, and reactive to light. Conjunctivae and EOM are normal.   Neck: Normal range of motion. Neck supple. No JVD present. No tracheal deviation present. No thyromegaly present.   Cardiovascular: Normal rate, regular rhythm and normal heart sounds. Exam reveals no friction rub.   No murmur heard.  Pulmonary/Chest: Effort normal and breath sounds normal. No stridor. No respiratory distress. He has no wheezes. He has no rales. He exhibits no tenderness.   Abdominal: Soft. Bowel sounds are normal. He exhibits no distension. There is no tenderness. There is no rebound and no guarding.   Musculoskeletal: Normal range of motion. He exhibits no edema, tenderness or deformity.   Lymphadenopathy:     He has no axillary adenopathy.   Neurological: He is alert and oriented to person, place, and time. He displays normal reflexes. No cranial nerve deficit or sensory deficit. He exhibits normal muscle tone. Coordination normal.   Skin: Skin is warm and dry. Capillary refill takes less than 2 seconds. No rash noted. He is not diaphoretic. No erythema. No pallor.   Psychiatric: He has a normal mood and affect. His behavior is normal. Judgment and thought content  normal.       ECOG Performance Status: (foot note - ECOG PS provided by Eastern Cooperative Oncology Group) 0 - Asymptomatic    Karnofsky Performance Score:  100%- Normal, No Complaints, No Evidence of Disease    Labs:   Lab Results   Component Value Date    WBC 4.32 04/05/2019    HGB 14.2 04/05/2019    HCT 43.6 04/05/2019     04/05/2019    CHOL 157 03/14/2019    TRIG 263 (H) 03/14/2019    HDL 35 (L) 03/14/2019    ALT 34 04/05/2019    AST 34 04/05/2019     (L) 04/05/2019    K 4.2 04/05/2019     04/05/2019    CREATININE 1.2 04/05/2019    BUN 17 04/05/2019    CO2 24 04/05/2019    TSH 1.136 09/23/2015    INR 1.1 11/05/2018    HGBA1C 5.2 11/29/2018         Imaging: Previous imaging has been reviewed    Assessment and Plan:     Mr. 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  --Initial pathology in 2015 was most consistent with Burkitt's Lymphoma  --Received cycle 4 of RICE in the end of December 2018  --Prolonged dental clearance, but 6 teeth are now pulled  --Will arrange for a PET in the next week to assess current disease response  --We discussed if he is still in a CR there is benefit to a ASCT at this point to help prevent relapse  -- ASCT evaluation is dependent on current disease status    --He understands that he would need to live in the [place] area for roughly 30 days following ASCT, he has a care giver and this is not an issue      HIV  --Currently on Triumeq with good disease control  --Previously on and off HARRT therapy, we discussed the importance of medication adherence      30 minutes were spent face to face with the patient to discuss the disease, natural history, treatment options and survival statistics. I have provided the patient with an opportunity to ask questions and have all questions answered to his satisfaction.       he will return to clinic as per the  transplant coordinator's schedule, but knows to call in the interim if symptoms change or should a problem arise.        Sarahi Garcia MD  Hematology and Medical Oncology  Bone Marrow Transplant  [facility]

## 2019-04-08 ENCOUNTER — TELEPHONE (OUTPATIENT)
Dept: HEMATOLOGY/ONCOLOGY | Facility: CLINIC | Age: 29
End: 2019-04-08

## 2019-04-08 NOTE — TELEPHONE ENCOUNTER
Spoke to patient we scheduled the pet scan for Thursday 4/11.  Gave directions to dept, NPO for 6 hrs.      ----- Message from Sarahi Garcia MD sent at 4/8/2019 12:33 PM CDT -----  1. PET asap   2. Follow up will be arranged as per the transplant coordinators

## 2019-04-11 ENCOUNTER — HOSPITAL ENCOUNTER (OUTPATIENT)
Dept: RADIOLOGY | Facility: HOSPITAL | Age: 29
Discharge: HOME | End: 2019-04-11
Attending: INTERNAL MEDICINE
Payer: MEDICARE

## 2019-04-11 ENCOUNTER — PATIENT MESSAGE (OUTPATIENT)
Dept: HEMATOLOGY/ONCOLOGY | Facility: CLINIC | Age: 29
End: 2019-04-11

## 2019-04-11 DIAGNOSIS — C83.33 DIFFUSE LARGE B-CELL LYMPHOMA OF INTRA-ABDOMINAL LYMPH NODES: ICD-10-CM

## 2019-04-11 PROCEDURE — 78815 NM PET CT ROUTINE: ICD-10-PCS | Mod: 26,PS,, | Performed by: RADIOLOGY

## 2019-04-11 PROCEDURE — 78815 PET IMAGE W/CT SKULL-THIGH: CPT | Mod: TC,PS

## 2019-04-11 PROCEDURE — 78815 PET IMAGE W/CT SKULL-THIGH: CPT | Mod: 26,PS,, | Performed by: RADIOLOGY

## 2019-04-11 PROCEDURE — A9552 F18 FDG: HCPCS

## 2019-04-12 ENCOUNTER — TELEPHONE (OUTPATIENT)
Dept: HEMATOLOGY/ONCOLOGY | Facility: CLINIC | Age: 29
End: 2019-04-12

## 2019-04-12 DIAGNOSIS — C83.30 DIFFUSE LARGE B-CELL LYMPHOMA, UNSPECIFIED BODY REGION: Primary | ICD-10-CM

## 2019-04-12 LAB — POCT GLUCOSE: 103 MG/DL (ref 70–110)

## 2019-04-12 PROCEDURE — 88312 SPECIAL STAINS GROUP 1: CPT | Performed by: PATHOLOGY

## 2019-04-25 ENCOUNTER — ANESTHESIA (OUTPATIENT)
Dept: SURGERY | Facility: HOSPITAL | Age: 29
End: 2019-04-25
Payer: MEDICARE

## 2019-04-25 ENCOUNTER — HOSPITAL ENCOUNTER (OUTPATIENT)
Facility: HOSPITAL | Age: 29
Discharge: HOME | End: 2019-04-25
Attending: INTERNAL MEDICINE | Admitting: INTERNAL MEDICINE
Payer: MEDICARE

## 2019-04-25 ENCOUNTER — ANESTHESIA EVENT (OUTPATIENT)
Dept: SURGERY | Facility: HOSPITAL | Age: 29
End: 2019-04-25
Payer: MEDICARE

## 2019-04-25 VITALS
OXYGEN SATURATION: 100 % | WEIGHT: 166 LBS | BODY MASS INDEX: 23.77 KG/M2 | HEART RATE: 60 BPM | TEMPERATURE: 98 F | SYSTOLIC BLOOD PRESSURE: 123 MMHG | HEIGHT: 70 IN | DIASTOLIC BLOOD PRESSURE: 83 MMHG | RESPIRATION RATE: 18 BRPM

## 2019-04-25 DIAGNOSIS — C83.30 DLBCL (DIFFUSE LARGE B CELL LYMPHOMA): ICD-10-CM

## 2019-04-25 LAB — BONE MARROW WRIGHT STAIN COMMENT: NORMAL

## 2019-04-25 PROCEDURE — 88342 IMHCHEM/IMCYTCHM 1ST ANTB: CPT | Mod: 59 | Performed by: PATHOLOGY

## 2019-04-25 PROCEDURE — 88189 FLOWCYTOMETRY/READ 16 & >: CPT | Mod: ,,, | Performed by: PATHOLOGY

## 2019-04-25 PROCEDURE — D9220A PRA ANESTHESIA: ICD-10-PCS | Mod: CRNA,,, | Performed by: NURSE ANESTHETIST, CERTIFIED REGISTERED

## 2019-04-25 PROCEDURE — 88264 CHROMOSOME ANALYSIS 20-25: CPT

## 2019-04-25 PROCEDURE — 88313 SPECIAL STAINS GROUP 2: CPT

## 2019-04-25 PROCEDURE — 38222 PR BONE MARROW BIOPSY(IES) W/ASPIRATION(S); DIAGNOSTIC: ICD-10-PCS | Mod: RT,,, | Performed by: NURSE PRACTITIONER

## 2019-04-25 PROCEDURE — 85097 BONE MARROW INTERPRETATION: CPT | Mod: ,,, | Performed by: PATHOLOGY

## 2019-04-25 PROCEDURE — 88342 TISSUE SPECIMEN TO PATHOLOGY, BONE MARROW ASPIRATION/BIOPSY PROCEDURE: ICD-10-PCS | Mod: 26,59,, | Performed by: PATHOLOGY

## 2019-04-25 PROCEDURE — 88237 TISSUE CULTURE BONE MARROW: CPT

## 2019-04-25 PROCEDURE — 88305 TISSUE EXAM BY PATHOLOGIST: CPT | Performed by: PATHOLOGY

## 2019-04-25 PROCEDURE — 88184 FLOWCYTOMETRY/ TC 1 MARKER: CPT | Performed by: PATHOLOGY

## 2019-04-25 PROCEDURE — 88313 TISSUE SPECIMEN TO PATHOLOGY, BONE MARROW ASPIRATION/BIOPSY PROCEDURE: ICD-10-PCS | Mod: 26,,, | Performed by: PATHOLOGY

## 2019-04-25 PROCEDURE — 88313 SPECIAL STAINS GROUP 2: CPT | Mod: 26,,, | Performed by: PATHOLOGY

## 2019-04-25 PROCEDURE — 71000015 HC POSTOP RECOV 1ST HR: Performed by: INTERNAL MEDICINE

## 2019-04-25 PROCEDURE — 71000044 HC DOSC ROUTINE RECOVERY FIRST HOUR: Performed by: INTERNAL MEDICINE

## 2019-04-25 PROCEDURE — 88365 INSITU HYBRIDIZATION (FISH): CPT | Mod: 26,,, | Performed by: PATHOLOGY

## 2019-04-25 PROCEDURE — 25000003 PHARM REV CODE 250: Performed by: NURSE ANESTHETIST, CERTIFIED REGISTERED

## 2019-04-25 PROCEDURE — D9220A PRA ANESTHESIA: Mod: ANES,,, | Performed by: ANESTHESIOLOGY

## 2019-04-25 PROCEDURE — 88185 FLOWCYTOMETRY/TC ADD-ON: CPT | Mod: 59 | Performed by: PATHOLOGY

## 2019-04-25 PROCEDURE — 37000008 HC ANESTHESIA 1ST 15 MINUTES: Performed by: INTERNAL MEDICINE

## 2019-04-25 PROCEDURE — 88341 IMHCHEM/IMCYTCHM EA ADD ANTB: CPT | Mod: 26,59,, | Performed by: PATHOLOGY

## 2019-04-25 PROCEDURE — 36000704 HC OR TIME LEV I 1ST 15 MIN: Performed by: INTERNAL MEDICINE

## 2019-04-25 PROCEDURE — 88189 PR  FLOWCYTOMETRY/READ, 16 & > MARKERS: ICD-10-PCS | Mod: ,,, | Performed by: PATHOLOGY

## 2019-04-25 PROCEDURE — 88305 TISSUE SPECIMEN TO PATHOLOGY, BONE MARROW ASPIRATION/BIOPSY PROCEDURE: ICD-10-PCS | Mod: 26,,, | Performed by: PATHOLOGY

## 2019-04-25 PROCEDURE — 88341 TISSUE SPECIMEN TO PATHOLOGY, BONE MARROW ASPIRATION/BIOPSY PROCEDURE: ICD-10-PCS | Mod: 26,59,, | Performed by: PATHOLOGY

## 2019-04-25 PROCEDURE — 88342 IMHCHEM/IMCYTCHM 1ST ANTB: CPT | Mod: 26,59,, | Performed by: PATHOLOGY

## 2019-04-25 PROCEDURE — 38222 DX BONE MARROW BX & ASPIR: CPT | Mod: RT,,, | Performed by: NURSE PRACTITIONER

## 2019-04-25 PROCEDURE — 88305 TISSUE EXAM BY PATHOLOGIST: CPT | Mod: 26,,, | Performed by: PATHOLOGY

## 2019-04-25 PROCEDURE — D9220A PRA ANESTHESIA: Mod: CRNA,,, | Performed by: NURSE ANESTHETIST, CERTIFIED REGISTERED

## 2019-04-25 PROCEDURE — 88365 TISSUE SPECIMEN TO PATHOLOGY, BONE MARROW ASPIRATION/BIOPSY PROCEDURE: ICD-10-PCS | Mod: 26,,, | Performed by: PATHOLOGY

## 2019-04-25 PROCEDURE — 88311 TISSUE SPECIMEN TO PATHOLOGY, BONE MARROW ASPIRATION/BIOPSY PROCEDURE: ICD-10-PCS | Mod: 26,,, | Performed by: PATHOLOGY

## 2019-04-25 PROCEDURE — 88341 IMHCHEM/IMCYTCHM EA ADD ANTB: CPT | Mod: 59 | Performed by: PATHOLOGY

## 2019-04-25 PROCEDURE — 88311 DECALCIFY TISSUE: CPT | Mod: 26,,, | Performed by: PATHOLOGY

## 2019-04-25 PROCEDURE — 85097 TISSUE SPECIMEN TO PATHOLOGY, BONE MARROW ASPIRATION/BIOPSY PROCEDURE: ICD-10-PCS | Mod: ,,, | Performed by: PATHOLOGY

## 2019-04-25 PROCEDURE — 63600175 PHARM REV CODE 636 W HCPCS: Performed by: NURSE ANESTHETIST, CERTIFIED REGISTERED

## 2019-04-25 PROCEDURE — D9220A PRA ANESTHESIA: ICD-10-PCS | Mod: ANES,,, | Performed by: ANESTHESIOLOGY

## 2019-04-25 RX ORDER — PROPOFOL 10 MG/ML
VIAL (ML) INTRAVENOUS CONTINUOUS PRN
Status: DISCONTINUED | OUTPATIENT
Start: 2019-04-25 | End: 2019-04-25

## 2019-04-25 RX ORDER — PROPOFOL 10 MG/ML
VIAL (ML) INTRAVENOUS
Status: DISCONTINUED | OUTPATIENT
Start: 2019-04-25 | End: 2019-04-25

## 2019-04-25 RX ORDER — SODIUM CHLORIDE 9 MG/ML
INJECTION, SOLUTION INTRAVENOUS CONTINUOUS PRN
Status: DISCONTINUED | OUTPATIENT
Start: 2019-04-25 | End: 2019-04-25

## 2019-04-25 RX ORDER — LIDOCAINE HCL/PF 100 MG/5ML
SYRINGE (ML) INTRAVENOUS
Status: DISCONTINUED | OUTPATIENT
Start: 2019-04-25 | End: 2019-04-25

## 2019-04-25 RX ADMIN — PROPOFOL 70 MG: 10 INJECTION, EMULSION INTRAVENOUS at 08:04

## 2019-04-25 RX ADMIN — PROPOFOL 200 MCG/KG/MIN: 10 INJECTION, EMULSION INTRAVENOUS at 08:04

## 2019-04-25 RX ADMIN — LIDOCAINE HYDROCHLORIDE 100 MG: 20 INJECTION, SOLUTION INTRAVENOUS at 08:04

## 2019-04-25 RX ADMIN — PROPOFOL 80 MG: 10 INJECTION, EMULSION INTRAVENOUS at 08:04

## 2019-04-25 RX ADMIN — PROPOFOL 50 MG: 10 INJECTION, EMULSION INTRAVENOUS at 08:04

## 2019-04-25 RX ADMIN — SODIUM CHLORIDE: 0.9 INJECTION, SOLUTION INTRAVENOUS at 08:04

## 2019-04-25 NOTE — ANESTHESIA RELEASE NOTE
"Anesthesia Release from PACU Note    Patient: Alonzo Rajput    Procedure(s) Performed: Procedure(s) (LRB):  Biopsy-bone marrow (Right)    Anesthesia type: general    Post pain: Adequate analgesia    Post assessment: no apparent anesthetic complications    Last Vitals:   Visit Vitals  BP (!) 104/56 (BP Location: Left arm, Patient Position: Lying)   Pulse 67   Temp 36.6 °C (97.9 °F) (Temporal)   Resp 18   Ht 5' 10" (1.778 m)   Wt 75.3 kg (166 lb)   SpO2 100%   BMI 23.82 kg/m²       Post vital signs: stable    Level of consciousness: awake    Nausea/Vomiting: no nausea/no vomiting    Complications: none    Airway Patency: patent    Respiratory: unassisted    Cardiovascular: stable and blood pressure at baseline    Hydration: euvolemic  "

## 2019-04-25 NOTE — PROGRESS NOTES
Discharge instructions reviewed with Pt and Pt's family; understanding verbalized and handout copy given. VSS. Pt tolerating clear liquids by mouth without difficulty. Pt denies any c/o N/V and pain. Dressing to Pt's right hip remains CDI with no redness or swelling noted. PIV removed prior to discharge as ordered. Pt escorted to vehicle via wheel chair.

## 2019-04-25 NOTE — DISCHARGE INSTRUCTIONS
Discharge instructions for having a Bone Marrow Aspiration / Biopsy:    Keep Bandage in place for 24 hours.  - Do not shower or take a tub bath during this time (you may sponge bathe).  - Call the nurse or physician for excessive bleeding or pain at biopsy site.  - You may take Tylenol as needed for pain.    You have received medication to sedate you.  - Do not drive a car or operate heavy machinery for the rest of the day.  - You may resume other activities as tolerated.    You can call [phone] for any problems during the hours of 8:00 AM-5:00PM.    For an emergency after 5:00 PM you can call [phone] and have the  page the Hematologist / Oncologist on call.

## 2019-04-25 NOTE — BRIEF OP NOTE
PROCEDURE NOTE:  Date of Procedure: 04/25/2019  Bone Marrow Biopsy and Aspiration  Indication: DLBCL  Consent: Informed consent was obtained from patient.  Timeout: Done and documented.  Position: left lateral  Site: rightt posterior illiac crest.  Prep: Betadine.  Needle used: 11 gauge Jamshidi needle.  Anesthetic: 1% lidocaine 5 cc.  Biopsy: The biopsy needle was introduced into the marrow cavity and an aspirate was obtained without complications and sent for flow cytometry and cytogenetics. Core biopsy obtained without difficulty and sent for routine histologic examination.  Complications: None.  Disposition: The patient was discharged home per anesthesia protocol.  Blood loss: Minimal.     Emily Saha NP  Hematology/Oncology/BMT

## 2019-04-25 NOTE — ANESTHESIA POSTPROCEDURE EVALUATION
Anesthesia Post Evaluation    Patient: Alonzo Rajput    Procedure(s) Performed: Procedure(s) (LRB):  Biopsy-bone marrow (Right)    Final Anesthesia Type: general  Patient location during evaluation: [facility]  Patient participation: Yes- Able to Participate  Level of consciousness: awake and alert  Post-procedure vital signs: reviewed and stable  Pain management: adequate  Airway patency: patent  PONV status at discharge: No PONV  Anesthetic complications: no      Cardiovascular status: blood pressure returned to baseline  Respiratory status: unassisted  Hydration status: euvolemic  Follow-up not needed.          Vitals Value Taken Time   /56 4/25/2019  8:30 AM   Temp 36.6 °C (97.9 °F) 4/25/2019  8:23 AM   Pulse 67 4/25/2019  8:30 AM   Resp 18 4/25/2019  8:30 AM   SpO2 100 % 4/25/2019  8:30 AM         No case tracking events are documented in the log.      Pain/Breanna Score: Breanna Score: 9 (4/25/2019  8:40 AM)

## 2019-04-25 NOTE — INTERVAL H&P NOTE
The patient has been examined and the H&P has been reviewed:    I concur with the findings and no changes have occurred since H&P was written.    Anesthesia/Surgery risks, benefits and alternative options discussed and understood by patient/family.          Active Hospital Problems    Diagnosis  POA    DLBCL (diffuse large B cell lymphoma) [C83.30]  Yes      Resolved Hospital Problems   No resolved problems to display.

## 2019-04-25 NOTE — TRANSFER OF CARE
"Anesthesia Transfer of Care Note    Patient: Alonzo Rajput    Procedure(s) Performed: Procedure(s) (LRB):  Biopsy-bone marrow (Right)    Patient location: PACU    Anesthesia Type: general    Transport from OR: Transported from OR on 6-10 L/min O2 by face mask with adequate spontaneous ventilation    Post pain: adequate analgesia    Post assessment: no apparent anesthetic complications    Post vital signs: stable    Level of consciousness: sedated    Nausea/Vomiting: no nausea/vomiting    Complications: none    Transfer of care protocol was followed      Last vitals:   Visit Vitals  /81   Pulse 77   Temp 36.6 °C (97.9 °F) (Oral)   Resp 24   Ht 5' 10" (1.778 m)   Wt 75.3 kg (166 lb)   SpO2 100%   BMI 23.82 kg/m²     "

## 2019-04-25 NOTE — DISCHARGE SUMMARY
[facility]  Hematology  Bone Marrow Transplant  Discharge Summary      Patient Name: Alonzo Rajput  MRN: [id number]  Admission Date: 4/25/2019  Hospital Length of Stay: 0 days  Discharge Date and Time:  04/25/2019 8:23 AM  Attending Physician: Sarahi Garcia MD   Discharging Provider: Emily Saha NP  Primary Care Provider: Kendra Cabrera MD    HPI: No notes on file    Procedure(s) (LRB):  Biopsy-bone marrow (Left)     Hospital Course: Patient admitted to pre op today for a bone marrow aspiration and biopsy. Pt was consented for a bone marrow biopsy. Patient was sedated per anesthesia and a bone marrow biopsy and aspiration was performed in the OR (see procedure note). Patient was then transferred to post op and discharged home when appropriate per anesthesia.         Pending Diagnostic Studies:     None        Final Active Diagnoses:    Diagnosis Date Noted POA    DLBCL (diffuse large B cell lymphoma) [C83.30] 04/25/2019 Yes      Problems Resolved During this Admission:      Discharged Condition: stable    Disposition: Home or Self Care    Follow Up: With Dr. Garcia in BMT clinic    Patient Instructions:      Notify your health care provider if you experience any of the following:  temperature >100.4     Notify your health care provider if you experience any of the following:  severe uncontrolled pain     Notify your health care provider if you experience any of the following:  redness, tenderness, or signs of infection (pain, swelling, redness, odor or green/yellow discharge around incision site)     Remove dressing in 24 hours     Activity as tolerated     Medications:  Reconciled Home Medications:      Medication List      ASK your doctor about these medications    abacavir-dolutegravir-lamivud 600- mg Tab  Commonly known as:  TRIUMEQ  Take 1 tablet by mouth once daily.     acyclovir 400 MG tablet  Commonly known as:  ZOVIRAX  Take 1 tablet (400 mg total) by mouth 2  (two) times daily.     allopurinol 300 MG tablet  Commonly known as:  ZYLOPRIM  Take 1 tablet (300 mg total) by mouth once daily.     cetirizine 10 MG tablet  Commonly known as:  ZYRTEC  TAKE 1 TABLET BY MOUTH ONCE DAILY     citalopram 20 MG tablet  Commonly known as:  CELEXA  Take 1 tablet (20 mg total) by mouth once daily.     desonide 0.05 % cream  Commonly known as:  DESOWEN  Apply topically 2 (two) times daily. To ears, face and flexures BID for two weeks, then prn severe flares only for 10 days     diphenhydrAMINE 25 mg capsule  Commonly known as:  BENADRYL  Take 1 each (25 mg total) by mouth every 6 (six) hours as needed for Allergies (sinus congestion).     omeprazole 20 MG capsule  Commonly known as:  PriLOSEC  Take 1 capsule (20 mg total) by mouth once daily.     ondansetron 4 MG tablet  Commonly known as:  ZOFRAN  Take 1 tablet (4 mg total) by mouth every 6 (six) hours as needed for Nausea.     traZODone 100 MG tablet  Commonly known as:  DESYREL  Take one half hs for one week then one hs     triamcinolone acetonide 0.1% 0.1 % cream  Commonly known as:  KENALOG  Apply a thin layer to the arms avoid face, neck, axillas and groins twice daily. Limit 2 weeks at time            Emily Saha NP  Bone Marrow Transplant  [facility]

## 2019-04-25 NOTE — ANESTHESIA PREPROCEDURE EVALUATION
04/25/2019  Alonzo Rajput is a 28 y.o., male.    Anesthesia Evaluation    I have reviewed the Patient Summary Reports.     I have reviewed the Medications.     Review of Systems  Anesthesia Hx:  No problems with previous Anesthesia Denies Hx of Anesthetic complications  Neg history of prior surgery. Denies Family Hx of Anesthesia complications.   Denies Personal Hx of Anesthesia complications.   Hematology/Oncology:  Hematology Normal   Oncology Normal     EENT/Dental:EENT/Dental Normal   Cardiovascular:   Exercise tolerance: good Denies Hypertension.  Denies MI.    Denies Angina.  Functional Capacity good / => 4 METS  Carotoid Artery Disease    Pulmonary:   Asthma asymptomatic    Renal/:  Renal/ Normal     Hepatic/GI:   Denies GERD.    Neurological:   Denies Headaches.  Denies Pain    Endocrine:  Endocrine Normal Denies Diabetes.    Psych:  Psychiatric Normal   Phobia and Claustrophobia.         Physical Exam   Airway/Jaw/Neck:  Airway Findings: Mouth Opening: Normal Tongue: Normal  General Airway Assessment: Adult  Mallampati: III  Improves to II with phonation.  TM Distance: Normal, at least 6 cm  Jaw/Neck Findings:  Neck ROM: Normal ROM      Dental:  Dental Findings: In tact   Chest/Lungs:  Chest/Lungs Findings: Clear to auscultation, Normal Respiratory Rate     Heart/Vascular:  Heart Findings: Rate: Normal  Rhythm: Regular Rhythm  Sounds: Normal             Anesthesia Plan  Type of Anesthesia, risks & benefits discussed:  Anesthesia Type:  general  Patient's Preference:   Intra-op Monitoring Plan: standard ASA monitors  Intra-op Monitoring Plan Comments:   Post Op Pain Control Plan: multimodal analgesia  Post Op Pain Control Plan Comments:   Induction:   IV  Beta Blocker:  Patient is not currently on a Beta-Blocker (No further documentation required).       Informed Consent: Patient  understands risks and agrees with Anesthesia plan.  Questions answered. Anesthesia consent signed with patient.  ASA Score: 3     Day of Surgery Review of History & Physical:    H&P update referred to the provider.         Ready For Surgery From Anesthesia Perspective.

## 2019-04-26 LAB — BONE MARROW IRON STAIN COMMENT: NORMAL

## 2019-04-29 LAB
BODY SITE - BONE MARROW: NORMAL
CLINICAL DIAGNOSIS - BONE MARROW: NORMAL
FLOW CYTOMETRY ANTIBODIES ANALYZED - BONE MARROW: NORMAL
FLOW CYTOMETRY COMMENT - BONE MARROW: NORMAL
FLOW CYTOMETRY INTERPRETATION - BONE MARROW: NORMAL

## 2019-05-01 PROBLEM — L30.9 DERMATITIS: Status: ACTIVE | Noted: 2019-05-01

## 2019-05-03 LAB
CHROM BANDING METHOD: NORMAL
CHROMOSOME ANALYSIS BM ADDITIONAL INFORMATION: NORMAL
CHROMOSOME ANALYSIS BM RELEASED BY: NORMAL
CHROMOSOME ANALYSIS BM RESULT SUMMARY: NORMAL
CLINICAL CYTOGENETICIST REVIEW: NORMAL
KARYOTYP MAR: NORMAL
REASON FOR REFERRAL (NARRATIVE): NORMAL
REF LAB TEST METHOD: NORMAL
SPECIMEN SOURCE: NORMAL
SPECIMEN: NORMAL

## 2019-05-28 ENCOUNTER — PATIENT MESSAGE (OUTPATIENT)
Dept: HEMATOLOGY/ONCOLOGY | Facility: CLINIC | Age: 29
End: 2019-05-28

## 2019-06-11 ENCOUNTER — PATIENT MESSAGE (OUTPATIENT)
Dept: HEMATOLOGY/ONCOLOGY | Facility: CLINIC | Age: 29
End: 2019-06-11

## 2019-06-25 PROBLEM — R20.0 NUMBNESS AND TINGLING IN LEFT HAND: Status: RESOLVED | Noted: 2017-01-18 | Resolved: 2019-06-25

## 2019-06-25 PROBLEM — R20.2 NUMBNESS AND TINGLING IN LEFT HAND: Status: RESOLVED | Noted: 2017-01-18 | Resolved: 2019-06-25

## 2019-06-25 PROBLEM — B02.8 HERPES ZOSTER WITH COMPLICATION: Status: RESOLVED | Noted: 2018-11-06 | Resolved: 2019-06-25

## 2019-07-23 ENCOUNTER — TELEPHONE (OUTPATIENT)
Dept: HEMATOLOGY/ONCOLOGY | Facility: CLINIC | Age: 29
End: 2019-07-23
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
## 2019-08-08 ENCOUNTER — PATIENT MESSAGE (OUTPATIENT)
Dept: HEMATOLOGY/ONCOLOGY | Facility: CLINIC | Age: 29
End: 2019-08-08

## 2019-08-12 ENCOUNTER — PATIENT MESSAGE (OUTPATIENT)
Dept: HEMATOLOGY/ONCOLOGY | Facility: CLINIC | Age: 29
End: 2019-08-12

## 2019-08-30 ENCOUNTER — PATIENT MESSAGE (OUTPATIENT)
Dept: HEMATOLOGY/ONCOLOGY | Facility: CLINIC | Age: 29
End: 2019-08-30

## 2019-08-30 ENCOUNTER — TELEPHONE (OUTPATIENT)
Dept: HEMATOLOGY/ONCOLOGY | Facility: CLINIC | Age: 29
End: 2019-08-30

## 2019-09-03 ENCOUNTER — TELEPHONE (OUTPATIENT)
Dept: HEMATOLOGY/ONCOLOGY | Facility: CLINIC | Age: 29
End: 2019-09-03

## 2019-09-03 NOTE — TELEPHONE ENCOUNTER
"----- Message from Minerva Ennis sent at 9/3/2019  8:30 AM CDT -----    Name Of Caller: Mandi   Provider Name: Sarahi Garcia MD  What is the nature of the call?:    Clearance form needed to be sent for the pt to have dental work done. If there's any restrictions or procedures /medications that he cannot have please state that on the clearance form .    Does patient feel the need to be seen today? No  Relationship to the Pt?: None  Contact Preference?:  email : [email or web address] / [phone]    Additional Notes:  "Thank you for all that you do for our patients'"        "

## 2019-10-11 ENCOUNTER — PATIENT MESSAGE (OUTPATIENT)
Dept: HEMATOLOGY/ONCOLOGY | Facility: CLINIC | Age: 29
End: 2019-10-11

## 2019-12-02 ENCOUNTER — TELEPHONE (OUTPATIENT)
Dept: HEMATOLOGY/ONCOLOGY | Facility: CLINIC | Age: 29
End: 2019-12-02

## 2019-12-02 NOTE — TELEPHONE ENCOUNTER
I'm still waiting for more details myself from the oral surgeon. It's hard to get in touch by phone to the oral surgeon office. But I will continue trying.          You  Alonzo Rajput 1 month ago         We are not informed.  I got the dental letter from the dentist but not the oral surgeon.   We received on from Dr. Ceron.          Hallie Zamudio RN  You 1 month ago      Did we receive his dental clearance?     Routing comment       Alonzo Rajput Ashley D, MD 1 month ago         Hello     I'm just reaching out to know what to do next. Also are you informed about my dental work?

## 2020-08-05 PROBLEM — Z85.72 HISTORY OF LYMPHOMA: Status: ACTIVE | Noted: 2020-08-05

## 2020-08-10 ENCOUNTER — PATIENT MESSAGE (OUTPATIENT)
Dept: HEMATOLOGY/ONCOLOGY | Facility: CLINIC | Age: 30
End: 2020-08-10

## 2020-08-10 NOTE — TELEPHONE ENCOUNTER
Eval was repeatedly delayed due to lack of dental clearance. He needed a lot of teeth pulled. Based on recent notes it looks like he still hasn't completed this process.     At this point he is almost 2 years post treatment, which greatly reduces the benefit of a consolidation transplant.

## 2020-08-31 DIAGNOSIS — C83.30 DIFFUSE LARGE B-CELL LYMPHOMA, UNSPECIFIED BODY REGION: Primary | ICD-10-CM

## 2020-09-09 ENCOUNTER — OFFICE VISIT (OUTPATIENT)
Dept: HEMATOLOGY/ONCOLOGY | Facility: CLINIC | Age: 30
End: 2020-09-09
Payer: MEDICARE

## 2020-09-09 ENCOUNTER — PATIENT MESSAGE (OUTPATIENT)
Dept: HEMATOLOGY/ONCOLOGY | Facility: CLINIC | Age: 30
End: 2020-09-09

## 2020-09-09 DIAGNOSIS — J45.20 MILD INTERMITTENT ASTHMA WITHOUT COMPLICATION: ICD-10-CM

## 2020-09-09 DIAGNOSIS — Z98.890 HISTORY OF REMOVAL OF PORT-A-CATH: Primary | ICD-10-CM

## 2020-09-09 DIAGNOSIS — Z86.19 HISTORY OF AIDS WITH HERPES ZOSTER: ICD-10-CM

## 2020-09-09 DIAGNOSIS — B20 HISTORY OF AIDS WITH HERPES ZOSTER: ICD-10-CM

## 2020-09-09 DIAGNOSIS — C83.39 DIFFUSE LARGE B-CELL LYMPHOMA OF SOLID ORGAN EXCLUDING SPLEEN: ICD-10-CM

## 2020-09-09 PROCEDURE — 99215 OFFICE O/P EST HI 40 MIN: CPT | Mod: 95,,, | Performed by: INTERNAL MEDICINE

## 2020-09-09 PROCEDURE — 99215 PR OFFICE/OUTPT VISIT, EST, LEVL V, 40-54 MIN: ICD-10-PCS | Mod: 95,,, | Performed by: INTERNAL MEDICINE

## 2020-09-09 NOTE — PROGRESS NOTES
Hematology and Medical Oncology   Follow Up     09/09/2020    Primary Oncologic Diagnosis: Relapsed aggressive lymphoma    Telemedicine Documentation:  The patient location is: home  The chief complaint leading to consultation is: lymphoma follow up    Visit type: audiovisual    Face to Face time with patient: 25  30 minutes of total time spent on the encounter, which includes face to face time and non-face to face time preparing to see the patient (eg, review of tests), Obtaining and/or reviewing separately obtained history, Documenting clinical information in the electronic or other health record, Independently interpreting results (not separately reported) and communicating results to the patient/family/caregiver, or Care coordination (not separately reported).         Each patient to whom he or she provides medical services by telemedicine is:  (1) informed of the relationship between the physician and patient and the respective role of any other health care provider with respect to management of the patient; and (2) notified that he or she may decline to receive medical services by telemedicine and may withdraw from such care at any time.    History of Present Ilness:   Alonzo Farooq) is a pleasant 29 y.o.male who has currently receiving salvage RICE therapy. Completed cycle 4 at the end of December 2018. He presents today to discuss auto stem cell transplant options.    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   --9/11/2018 PET/CT shows Extensive hypermetabolic adenopathy involving the neck, chest, and lower abdomen/pelvis with index lesions detailed  above most suggestive of lymphoma.  Also increased activity involving adenoidal and palatine tonsillar tissues may be reactive or could indicate lymphoma involvement.  There is diffuse mild increased activity within the spleen.   --BM biopsy 9/12/2018 negative for disease  --Lymph node, left inguinal, excision on 9/13/2018  --RICE x 3 has been complicated with disseminated shingles, now recovered.  --PET 12/15/19 Abnormal focus of increased radiotracer uptake identified in the left submandibular gland, SUV max 6.69  --Cycle 4 of Rice on 12/26/18    Interval History:  Mr. Rajput was lost to follow up since 4/2019. He was suppose to have his teeth extracted as part of the transplant clearance, but has not completed this, reportedly due to miscommunication/ slow communication between the dental school and primary doctor. He has been doing well since we last saw him and specifically denies lad, drenching night sweats or unintentional weight loss.    PAST MEDICAL HISTORY:   Past Medical History:   Diagnosis Date    Allergy     Bronchitis     Cancer     lymphoma    Candida esophagitis     Diffuse large B cell lymphoma     DLBCL (diffuse large B cell lymphoma) 11/5/2018    Ear infection     Encounter for blood transfusion     Fever blister     Hepatitis B     HIV (human immunodeficiency virus infection)     UGI bleed        PAST SURGICAL HISTORY:   Past Surgical History:   Procedure Laterality Date    BONE MARROW BIOPSY Right 4/25/2019    Procedure: Biopsy-bone marrow;  Surgeon: Sarahi Garcia MD;  Location: [address];  Service: Orthopedics;  Laterality: Right;    ESOPHAGOGASTRODUODENOSCOPY      INSERTION OF TUNNELED CENTRAL VENOUS CATHETER (CVC) WITH SUBCUTANEOUS PORT Left 9/13/2018    Procedure: HVIRJDOYP-AMES-E-CATH;  Surgeon: Luis Bogran-Reyes, MD;  Location: [facility];  Service: General;  Laterality: Left;    LYMPH NODE BIOPSY Left 9/13/2018    Procedure: BIOPSY, LYMPH NODE;  Surgeon: Scooter  Bogran-Reyes, MD;  Location: [facility];  Service: General;  Laterality: Left;  inguinal lymph node excisional biopsy    port a cath removal      PORTACATH PLACEMENT      UPPER GASTROINTESTINAL ENDOSCOPY         PAST SOCIAL HISTORY:   reports that he quit smoking about 4 years ago. His smoking use included cigarettes. He quit after 2.00 years of use. He has never used smokeless tobacco. He reports current alcohol use. He reports current drug use. Frequency: 4.00 times per week. Drug: Marijuana.    FAMILY HISTORY:  Family History   Problem Relation Age of Onset    No Known Problems Mother     No Known Problems Father     No Known Problems Sister     No Known Problems Brother     Lupus Sister     No Known Problems Sister     No Known Problems Brother     No Known Problems Brother        CURRENT MEDICATIONS:   Current Outpatient Medications   Medication Sig    abacavir-dolutegravir-lamivud (TRIUMEQ) 600- mg Tab Take 1 tablet by mouth once daily.    ammonium lactate 12 % Crea For application to dry skin as needed.    cetirizine (ZYRTEC) 10 MG tablet Take 1 tablet (10 mg total) by mouth once daily.    citalopram (CELEXA) 20 MG tablet Take 1 tablet (20 mg total) by mouth once daily.    diphenhydrAMINE (BENADRYL) 25 mg capsule Take 1 each (25 mg total) by mouth every 6 (six) hours as needed for Allergies (sinus congestion).    hydrocortisone 2.5 % cream Apply topically 2 (two) times daily. Apply to face once daily for 7 days.    omeprazole (PRILOSEC) 20 MG capsule Take 1 capsule (20 mg total) by mouth once daily.     No current facility-administered medications for this visit.      ALLERGIES:   Review of patient's allergies indicates:   Allergen Reactions    Amoxicillin Swelling         Review of Systems:     Review of Systems   Constitutional: Negative for appetite change, chills, diaphoresis, fatigue, fever and unexpected weight change.   HENT:   Negative for hearing loss, mouth sores, nosebleeds,  sore throat, trouble swallowing and voice change.    Eyes: Negative for eye problems and icterus.   Respiratory: Negative for chest tightness, cough, hemoptysis, shortness of breath and wheezing.    Cardiovascular: Negative for chest pain, leg swelling and palpitations.   Gastrointestinal: Negative for abdominal distention, abdominal pain, blood in stool, diarrhea, nausea and vomiting.   Endocrine: Negative for hot flashes.   Genitourinary: Negative for bladder incontinence, difficulty urinating, dysuria, frequency and hematuria.    Musculoskeletal: Negative for arthralgias, back pain, flank pain, gait problem, myalgias, neck pain and neck stiffness.   Skin: Negative for itching, rash and wound.   Neurological: Negative for dizziness, extremity weakness, gait problem, headaches, numbness, seizures and speech difficulty.   Hematological: Negative for adenopathy. Does not bruise/bleed easily.   Psychiatric/Behavioral: Negative for confusion, depression and sleep disturbance. The patient is not nervous/anxious.           Physical Exam:     Limited secondary to virtual visit    ECOG Performance Status: (foot note - ECOG PS provided by Eastern Cooperative Oncology Group) 0 - Asymptomatic    Karnofsky Performance Score:  100%- Normal, No Complaints, No Evidence of Disease    Labs:   Lab Results   Component Value Date    WBC 4.03 09/03/2020    HGB 14.8 09/03/2020    HCT 44.7 09/03/2020     09/03/2020    CHOL 157 03/14/2019    TRIG 263 (H) 03/14/2019    HDL 35 (L) 03/14/2019    ALT 19 09/03/2020    AST 18 09/03/2020     09/03/2020    K 3.8 09/03/2020     09/03/2020    CREATININE 1.3 09/03/2020    BUN 10 09/03/2020    CO2 28 09/03/2020    TSH 1.136 09/23/2015    INR 1.1 11/05/2018    HGBA1C 5.2 11/29/2018     LDH: 152     Imaging: Previous imaging has been reviewed    Assessment and Plan:     Mr. 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 He has delayed the eval process to the point that there is no data regarding transplant this far removed  --We will monitor q3 months for signs/symptoms of relapse      --He understands that he would need to live in the [place] area for roughly 30 days following ASCT, he has a care giver and this is not an issue      HIV  --Currently on Triumeq with good disease control  --Previously on and off HARRT therapy, we discussed the importance of medication adherence      30 minutes were spent face to face with the patient to discuss the disease, natural history, treatment options and survival statistics. I have provided the patient with an opportunity to ask questions and have all questions answered to his satisfaction.       he will return to clinic in 3 months with labs, but knows to call in the interim if symptoms change or should a problem arise.        Sarahi Garcia MD  Hematology and Medical Oncology  Bone Marrow Transplant  [facility]

## 2020-10-01 DIAGNOSIS — C83.39 DIFFUSE LARGE B-CELL LYMPHOMA OF SOLID ORGAN EXCLUDING SPLEEN: Primary | ICD-10-CM

## 2020-11-05 ENCOUNTER — PATIENT MESSAGE (OUTPATIENT)
Dept: HEMATOLOGY/ONCOLOGY | Facility: CLINIC | Age: 30
End: 2020-11-05

## 2020-11-12 ENCOUNTER — PATIENT MESSAGE (OUTPATIENT)
Dept: HEMATOLOGY/ONCOLOGY | Facility: CLINIC | Age: 30
End: 2020-11-12

## 2020-12-15 ENCOUNTER — OFFICE VISIT (OUTPATIENT)
Dept: HEMATOLOGY/ONCOLOGY | Facility: CLINIC | Age: 30
End: 2020-12-15
Payer: MEDICARE

## 2020-12-15 DIAGNOSIS — Z86.19 HISTORY OF SYPHILIS: ICD-10-CM

## 2020-12-15 DIAGNOSIS — C83.30 DIFFUSE LARGE B-CELL LYMPHOMA, UNSPECIFIED BODY REGION: Primary | ICD-10-CM

## 2020-12-15 DIAGNOSIS — Z86.19 HISTORY OF HEPATITIS B: ICD-10-CM

## 2020-12-15 DIAGNOSIS — Z86.19 HISTORY OF AIDS WITH HERPES ZOSTER: ICD-10-CM

## 2020-12-15 DIAGNOSIS — B20 HISTORY OF AIDS WITH HERPES ZOSTER: ICD-10-CM

## 2020-12-15 PROCEDURE — 99214 OFFICE O/P EST MOD 30 MIN: CPT | Mod: 95,,, | Performed by: INTERNAL MEDICINE

## 2020-12-15 PROCEDURE — 99214 PR OFFICE/OUTPT VISIT, EST, LEVL IV, 30-39 MIN: ICD-10-PCS | Mod: 95,,, | Performed by: INTERNAL MEDICINE

## 2020-12-15 NOTE — PROGRESS NOTES
Hematology and Medical Oncology   Follow Up     12/15/2020    Primary Oncologic Diagnosis: Relapsed aggressive lymphoma    Telemedicine Documentation:  The patient location is: home  The chief complaint leading to consultation is: lymphoma follow up    Visit type: audiovisual    Face to Face time with patient: 25  30 minutes of total time spent on the encounter, which includes face to face time and non-face to face time preparing to see the patient (eg, review of tests), Obtaining and/or reviewing separately obtained history, Documenting clinical information in the electronic or other health record, Independently interpreting results (not separately reported) and communicating results to the patient/family/caregiver, or Care coordination (not separately reported).         Each patient to whom he or she provides medical services by telemedicine is:  (1) informed of the relationship between the physician and patient and the respective role of any other health care provider with respect to management of the patient; and (2) notified that he or she may decline to receive medical services by telemedicine and may withdraw from such care at any time.    History of Present Ilness:   Alonzo Rajput (Alonzo) is a pleasant 30 y.o.male who has currently receiving salvage RICE therapy. Completed cycle 4 at the end of December 2018. He presents today to discuss auto stem cell transplant options.    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   --9/11/2018 PET/CT shows Extensive hypermetabolic adenopathy involving the neck, chest, and lower abdomen/pelvis with index lesions detailed  above most suggestive of lymphoma.  Also increased activity involving adenoidal and palatine tonsillar tissues may be reactive or could indicate lymphoma involvement.  There is diffuse mild increased activity within the spleen.   --BM biopsy 9/12/2018 negative for disease  --Lymph node, left inguinal, excision on 9/13/2018  --RICE x 3 has been complicated with disseminated shingles, now recovered.  --PET 12/15/19 Abnormal focus of increased radiotracer uptake identified in the left submandibular gland, SUV max 6.69  --Cycle 4 of Rice on 12/26/18    Interval History:  Mr. Rajput is doing well. Working from home. Occasionally feels 1 node is sore for a day or so. And then goes away. He has not experienced any additional painless or enlarged nodes. Continues to deny night sweats, lymphadenopathy, unintentional weight loss.        PAST MEDICAL HISTORY:   Past Medical History:   Diagnosis Date    Allergy     Bronchitis     Cancer     lymphoma    Candida esophagitis     Diffuse large B cell lymphoma     DLBCL (diffuse large B cell lymphoma) 11/5/2018    Ear infection     Encounter for blood transfusion     Fever blister     Hepatitis B     HIV (human immunodeficiency virus infection)     UGI bleed        PAST SURGICAL HISTORY:   Past Surgical History:   Procedure Laterality Date    BONE MARROW BIOPSY Right 4/25/2019    Procedure: Biopsy-bone marrow;  Surgeon: Sarahi Garcia MD;  Location: [address];  Service: Orthopedics;  Laterality: Right;    ESOPHAGOGASTRODUODENOSCOPY      INSERTION OF TUNNELED CENTRAL VENOUS CATHETER (CVC) WITH SUBCUTANEOUS PORT Left 9/13/2018    Procedure: [id number]-[id number];  Surgeon: Luis Bogran-Reyes, MD;  Location: [facility];  Service: General;  Laterality: Left;    LYMPH NODE BIOPSY Left 9/13/2018    Procedure: BIOPSY, LYMPH NODE;  Surgeon: Luis Bogran-Reyes, MD;  Location: [facility];  Service: General;  Laterality: Left;  inguinal lymph node excisional biopsy    port a  cath removal      PORTACATH PLACEMENT      UPPER GASTROINTESTINAL ENDOSCOPY         PAST SOCIAL HISTORY:   reports that he quit smoking about 5 years ago. His smoking use included cigarettes. He quit after 2.00 years of use. He has never used smokeless tobacco. He reports current alcohol use. He reports current drug use. Frequency: 4.00 times per week. Drug: Marijuana.    FAMILY HISTORY:  Family History   Problem Relation Age of Onset    No Known Problems Mother     No Known Problems Father     No Known Problems Sister     No Known Problems Brother     Lupus Sister     No Known Problems Sister     No Known Problems Brother     No Known Problems Brother        CURRENT MEDICATIONS:   Current Outpatient Medications   Medication Sig    abacavir-dolutegravir-lamivud (TRIUMEQ) 600- mg Tab Take 1 tablet by mouth once daily.    cetirizine (ZYRTEC) 10 MG tablet Take 1 tablet (10 mg total) by mouth once daily.    citalopram (CELEXA) 20 MG tablet Take 1 tablet (20 mg total) by mouth once daily.    diphenhydrAMINE (BENADRYL) 25 mg capsule Take 1 each (25 mg total) by mouth every 6 (six) hours as needed for Allergies (sinus congestion).    omeprazole (PRILOSEC) 20 MG capsule Take 1 capsule (20 mg total) by mouth once daily.     No current facility-administered medications for this visit.      ALLERGIES:   Review of patient's allergies indicates:   Allergen Reactions    Amoxicillin Swelling         Review of Systems:     Review of Systems   Constitutional: Negative for appetite change, chills, diaphoresis, fatigue, fever and unexpected weight change.   HENT:   Negative for hearing loss, mouth sores, nosebleeds, sore throat, trouble swallowing and voice change.    Eyes: Negative for eye problems and icterus.   Respiratory: Negative for chest tightness, cough, hemoptysis, shortness of breath and wheezing.    Cardiovascular: Negative for chest pain, leg swelling and palpitations.   Gastrointestinal: Negative  for abdominal distention, abdominal pain, blood in stool, diarrhea, nausea and vomiting.   Endocrine: Negative for hot flashes.   Genitourinary: Negative for bladder incontinence, difficulty urinating, dysuria, frequency and hematuria.    Musculoskeletal: Negative for arthralgias, back pain, flank pain, gait problem, myalgias, neck pain and neck stiffness.   Skin: Negative for itching, rash and wound.   Neurological: Negative for dizziness, extremity weakness, gait problem, headaches, numbness, seizures and speech difficulty.   Hematological: Negative for adenopathy. Does not bruise/bleed easily.   Psychiatric/Behavioral: Negative for confusion, depression and sleep disturbance. The patient is not nervous/anxious.           Physical Exam:     Limited secondary to virtual visit    ECOG Performance Status: (foot note - ECOG PS provided by Eastern Cooperative Oncology Group) 0 - Asymptomatic    Karnofsky Performance Score:  100%- Normal, No Complaints, No Evidence of Disease    Labs:   Lab Results   Component Value Date    WBC 4.23 12/14/2020    HGB 14.3 12/14/2020    HCT 44.2 12/14/2020     12/14/2020    CHOL 157 03/14/2019    TRIG 263 (H) 03/14/2019    HDL 35 (L) 03/14/2019    ALT 26 12/14/2020    AST 26 12/14/2020     12/14/2020    K 3.7 12/14/2020     12/14/2020    CREATININE 1.3 12/14/2020    BUN 14 12/14/2020    CO2 27 12/14/2020    TSH 1.136 09/23/2015    INR 1.1 11/05/2018    HGBA1C 5.2 11/29/2018     LDH: 158    Imaging: Previous imaging has been reviewed    Assessment and Plan:     Mr. 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 He has delayed the eval process to the point that there is no data regarding transplant this far removed  --We will monitor q3 months for signs/symptoms of relapse      --He understands that he would need to live in the [place] area for roughly 30 days following ASCT, he has a care giver and this is not an issue      HIV  --Currently on Triumeq with good disease control  --Previously on and off HARRT therapy, we discussed the importance of medication adherence      30 minutes were spent face to face with the patient to discuss the disease, natural history, treatment options and survival statistics. I have provided the patient with an opportunity to ask questions and have all questions answered to his satisfaction.       he will return to clinic in 3 months with labs, but knows to call in the interim if symptoms change or should a problem arise.        Sarahi Garcia MD  Hematology and Medical Oncology  Bone Marrow Transplant  [facility]

## 2020-12-16 DIAGNOSIS — C83.30 DIFFUSE LARGE B-CELL LYMPHOMA, UNSPECIFIED BODY REGION: Primary | ICD-10-CM

## 2020-12-17 DIAGNOSIS — C83.30 DIFFUSE LARGE B-CELL LYMPHOMA, UNSPECIFIED BODY REGION: Primary | ICD-10-CM

## 2021-01-08 ENCOUNTER — PATIENT MESSAGE (OUTPATIENT)
Dept: HEMATOLOGY/ONCOLOGY | Facility: CLINIC | Age: 31
End: 2021-01-08

## 2021-01-11 ENCOUNTER — DOCUMENTATION ONLY (OUTPATIENT)
Dept: HEMATOLOGY/ONCOLOGY | Facility: CLINIC | Age: 31
End: 2021-01-11

## 2021-01-13 ENCOUNTER — PATIENT MESSAGE (OUTPATIENT)
Dept: HEMATOLOGY/ONCOLOGY | Facility: CLINIC | Age: 31
End: 2021-01-13

## 2021-02-11 ENCOUNTER — DOCUMENTATION ONLY (OUTPATIENT)
Dept: HEMATOLOGY/ONCOLOGY | Facility: CLINIC | Age: 31
End: 2021-02-11

## 2021-02-11 ENCOUNTER — PATIENT MESSAGE (OUTPATIENT)
Dept: HEMATOLOGY/ONCOLOGY | Facility: CLINIC | Age: 31
End: 2021-02-11

## 2021-02-18 ENCOUNTER — PATIENT MESSAGE (OUTPATIENT)
Dept: HEMATOLOGY/ONCOLOGY | Facility: CLINIC | Age: 31
End: 2021-02-18

## 2021-03-31 ENCOUNTER — OFFICE VISIT (OUTPATIENT)
Dept: HEMATOLOGY/ONCOLOGY | Facility: CLINIC | Age: 31
End: 2021-03-31
Payer: MEDICARE

## 2021-03-31 DIAGNOSIS — Z86.19 HISTORY OF SYPHILIS: ICD-10-CM

## 2021-03-31 DIAGNOSIS — J45.20 MILD INTERMITTENT ASTHMA WITHOUT COMPLICATION: ICD-10-CM

## 2021-03-31 DIAGNOSIS — B20 HISTORY OF AIDS WITH HERPES ZOSTER: ICD-10-CM

## 2021-03-31 DIAGNOSIS — Z86.19 HISTORY OF AIDS WITH HERPES ZOSTER: ICD-10-CM

## 2021-03-31 DIAGNOSIS — C83.38 DIFFUSE LARGE B-CELL LYMPHOMA OF LYMPH NODES OF MULTIPLE REGIONS: Primary | ICD-10-CM

## 2021-03-31 PROCEDURE — 99215 OFFICE O/P EST HI 40 MIN: CPT | Mod: 95,,, | Performed by: INTERNAL MEDICINE

## 2021-03-31 PROCEDURE — 99215 PR OFFICE/OUTPT VISIT, EST, LEVL V, 40-54 MIN: ICD-10-PCS | Mod: 95,,, | Performed by: INTERNAL MEDICINE

## 2021-04-05 ENCOUNTER — PATIENT MESSAGE (OUTPATIENT)
Dept: HEMATOLOGY/ONCOLOGY | Facility: CLINIC | Age: 31
End: 2021-04-05

## 2021-04-06 ENCOUNTER — TELEPHONE (OUTPATIENT)
Dept: HEMATOLOGY/ONCOLOGY | Facility: CLINIC | Age: 31
End: 2021-04-06

## 2021-04-06 DIAGNOSIS — C83.38 DIFFUSE LARGE B-CELL LYMPHOMA OF LYMPH NODES OF MULTIPLE REGIONS: Primary | ICD-10-CM

## 2021-05-04 ENCOUNTER — PATIENT MESSAGE (OUTPATIENT)
Dept: RESEARCH | Facility: HOSPITAL | Age: 31
End: 2021-05-04

## 2021-06-16 ENCOUNTER — PATIENT MESSAGE (OUTPATIENT)
Dept: HEMATOLOGY/ONCOLOGY | Facility: CLINIC | Age: 31
End: 2021-06-16

## 2021-06-16 DIAGNOSIS — M25.469 KNEE SWELLING: ICD-10-CM

## 2021-06-16 DIAGNOSIS — M25.569 KNEE PAIN, UNSPECIFIED CHRONICITY, UNSPECIFIED LATERALITY: Primary | ICD-10-CM

## 2021-07-26 ENCOUNTER — PATIENT MESSAGE (OUTPATIENT)
Dept: HEMATOLOGY/ONCOLOGY | Facility: CLINIC | Age: 31
End: 2021-07-26

## 2021-08-06 ENCOUNTER — PATIENT MESSAGE (OUTPATIENT)
Dept: HEMATOLOGY/ONCOLOGY | Facility: CLINIC | Age: 31
End: 2021-08-06

## 2021-08-18 ENCOUNTER — OFFICE VISIT (OUTPATIENT)
Dept: HEMATOLOGY/ONCOLOGY | Facility: CLINIC | Age: 31
End: 2021-08-18
Payer: MEDICARE

## 2021-08-18 DIAGNOSIS — C83.30 DIFFUSE LARGE B-CELL LYMPHOMA, UNSPECIFIED BODY REGION: Primary | ICD-10-CM

## 2021-08-18 DIAGNOSIS — B20 AIDS: ICD-10-CM

## 2021-08-18 PROCEDURE — 99215 OFFICE O/P EST HI 40 MIN: CPT | Mod: 95,,, | Performed by: INTERNAL MEDICINE

## 2021-08-18 PROCEDURE — 99215 PR OFFICE/OUTPT VISIT, EST, LEVL V, 40-54 MIN: ICD-10-PCS | Mod: 95,,, | Performed by: INTERNAL MEDICINE

## 2021-09-16 ENCOUNTER — IMMUNIZATION (OUTPATIENT)
Dept: PRIMARY CARE CLINIC | Facility: CLINIC | Age: 31
End: 2021-09-16
Payer: MEDICARE

## 2021-09-16 DIAGNOSIS — Z23 NEED FOR VACCINATION: Primary | ICD-10-CM

## 2021-09-16 PROCEDURE — 0002A COVID-19, MRNA, LNP-S, PF, 30 MCG/0.3 ML DOSE VACCINE: CPT | Mod: CV19,PBBFAC | Performed by: INTERNAL MEDICINE

## 2021-09-16 PROCEDURE — 91300 COVID-19, MRNA, LNP-S, PF, 30 MCG/0.3 ML DOSE VACCINE: CPT | Mod: PBBFAC | Performed by: INTERNAL MEDICINE

## 2021-09-28 ENCOUNTER — PATIENT MESSAGE (OUTPATIENT)
Dept: HEMATOLOGY/ONCOLOGY | Facility: CLINIC | Age: 31
End: 2021-09-28

## 2021-11-05 ENCOUNTER — PATIENT MESSAGE (OUTPATIENT)
Dept: HEMATOLOGY/ONCOLOGY | Facility: CLINIC | Age: 31
End: 2021-11-05
Payer: MEDICARE

## 2022-01-28 ENCOUNTER — PATIENT MESSAGE (OUTPATIENT)
Dept: HEMATOLOGY/ONCOLOGY | Facility: CLINIC | Age: 32
End: 2022-01-28
Payer: MEDICARE

## 2022-02-02 ENCOUNTER — PATIENT MESSAGE (OUTPATIENT)
Dept: HEMATOLOGY/ONCOLOGY | Facility: CLINIC | Age: 32
End: 2022-02-02
Payer: MEDICARE

## 2022-05-26 PROBLEM — M87.051: Status: ACTIVE | Noted: 2022-05-26

## 2022-06-16 ENCOUNTER — PATIENT MESSAGE (OUTPATIENT)
Dept: HEMATOLOGY/ONCOLOGY | Facility: CLINIC | Age: 32
End: 2022-06-16
Payer: MEDICARE

## 2022-06-16 DIAGNOSIS — C83.30 DIFFUSE LARGE B-CELL LYMPHOMA, UNSPECIFIED BODY REGION: Primary | ICD-10-CM

## 2022-08-12 ENCOUNTER — LAB VISIT (OUTPATIENT)
Dept: LAB | Facility: HOSPITAL | Age: 32
End: 2022-08-12
Attending: INTERNAL MEDICINE
Payer: MEDICARE

## 2022-08-12 DIAGNOSIS — C83.30 DIFFUSE LARGE B-CELL LYMPHOMA, UNSPECIFIED BODY REGION: ICD-10-CM

## 2022-08-12 LAB
ALBUMIN SERPL BCP-MCNC: 4.1 G/DL (ref 3.5–5.2)
ALP SERPL-CCNC: 96 U/L (ref 55–135)
ALT SERPL W/O P-5'-P-CCNC: 26 U/L (ref 10–44)
ANION GAP SERPL CALC-SCNC: 8 MMOL/L (ref 8–16)
AST SERPL-CCNC: 24 U/L (ref 10–40)
BASOPHILS # BLD AUTO: 0.03 K/UL (ref 0–0.2)
BASOPHILS NFR BLD: 0.7 % (ref 0–1.9)
BILIRUB SERPL-MCNC: 0.5 MG/DL (ref 0.1–1)
BUN SERPL-MCNC: 15 MG/DL (ref 6–20)
CALCIUM SERPL-MCNC: 9.8 MG/DL (ref 8.7–10.5)
CHLORIDE SERPL-SCNC: 102 MMOL/L (ref 95–110)
CO2 SERPL-SCNC: 28 MMOL/L (ref 23–29)
CREAT SERPL-MCNC: 1.3 MG/DL (ref 0.5–1.4)
DIFFERENTIAL METHOD: ABNORMAL
EOSINOPHIL # BLD AUTO: 0.2 K/UL (ref 0–0.5)
EOSINOPHIL NFR BLD: 4.2 % (ref 0–8)
ERYTHROCYTE [DISTWIDTH] IN BLOOD BY AUTOMATED COUNT: 12.4 % (ref 11.5–14.5)
EST. GFR  (NO RACE VARIABLE): >60 ML/MIN/1.73 M^2
GLUCOSE SERPL-MCNC: 96 MG/DL (ref 70–110)
HCT VFR BLD AUTO: 42.4 % (ref 40–54)
HGB BLD-MCNC: 14.4 G/DL (ref 14–18)
IMM GRANULOCYTES # BLD AUTO: 0.01 K/UL (ref 0–0.04)
IMM GRANULOCYTES NFR BLD AUTO: 0.2 % (ref 0–0.5)
LDH SERPL L TO P-CCNC: 141 U/L (ref 110–260)
LYMPHOCYTES # BLD AUTO: 2 K/UL (ref 1–4.8)
LYMPHOCYTES NFR BLD: 49.1 % (ref 18–48)
MCH RBC QN AUTO: 31.9 PG (ref 27–31)
MCHC RBC AUTO-ENTMCNC: 34 G/DL (ref 32–36)
MCV RBC AUTO: 94 FL (ref 82–98)
MONOCYTES # BLD AUTO: 0.4 K/UL (ref 0.3–1)
MONOCYTES NFR BLD: 8.7 % (ref 4–15)
NEUTROPHILS # BLD AUTO: 1.5 K/UL (ref 1.8–7.7)
NEUTROPHILS NFR BLD: 37.1 % (ref 38–73)
NRBC BLD-RTO: 0 /100 WBC
PLATELET # BLD AUTO: 258 K/UL (ref 150–450)
PMV BLD AUTO: 9.5 FL (ref 9.2–12.9)
POTASSIUM SERPL-SCNC: 3.9 MMOL/L (ref 3.5–5.1)
PROT SERPL-MCNC: 7.6 G/DL (ref 6–8.4)
RBC # BLD AUTO: 4.52 M/UL (ref 4.6–6.2)
SODIUM SERPL-SCNC: 138 MMOL/L (ref 136–145)
WBC # BLD AUTO: 4.01 K/UL (ref 3.9–12.7)

## 2022-08-12 PROCEDURE — 80053 COMPREHEN METABOLIC PANEL: CPT | Performed by: INTERNAL MEDICINE

## 2022-08-12 PROCEDURE — 83615 LACTATE (LD) (LDH) ENZYME: CPT | Performed by: INTERNAL MEDICINE

## 2022-08-12 PROCEDURE — 36415 COLL VENOUS BLD VENIPUNCTURE: CPT | Performed by: INTERNAL MEDICINE

## 2022-08-12 PROCEDURE — 85025 COMPLETE CBC W/AUTO DIFF WBC: CPT | Performed by: INTERNAL MEDICINE

## 2022-08-15 ENCOUNTER — OFFICE VISIT (OUTPATIENT)
Dept: HEMATOLOGY/ONCOLOGY | Facility: CLINIC | Age: 32
End: 2022-08-15
Payer: MEDICARE

## 2022-08-15 DIAGNOSIS — C83.38 DIFFUSE LARGE B-CELL LYMPHOMA OF LYMPH NODES OF MULTIPLE REGIONS: ICD-10-CM

## 2022-08-15 DIAGNOSIS — C83.39 DIFFUSE LARGE B-CELL LYMPHOMA OF SOLID ORGAN EXCLUDING SPLEEN: ICD-10-CM

## 2022-08-15 DIAGNOSIS — J30.2 SEASONAL ALLERGIC RHINITIS, UNSPECIFIED TRIGGER: ICD-10-CM

## 2022-08-15 DIAGNOSIS — B20 AIDS: ICD-10-CM

## 2022-08-15 DIAGNOSIS — Z86.19 HISTORY OF HEPATITIS B: ICD-10-CM

## 2022-08-15 DIAGNOSIS — C83.30 DIFFUSE LARGE B-CELL LYMPHOMA, UNSPECIFIED BODY REGION: Primary | ICD-10-CM

## 2022-08-15 PROCEDURE — 99215 OFFICE O/P EST HI 40 MIN: CPT | Mod: 95,,, | Performed by: INTERNAL MEDICINE

## 2022-08-15 PROCEDURE — 99215 PR OFFICE/OUTPT VISIT, EST, LEVL V, 40-54 MIN: ICD-10-PCS | Mod: 95,,, | Performed by: INTERNAL MEDICINE

## 2022-08-15 RX ORDER — OMEPRAZOLE 20 MG/1
20 CAPSULE, DELAYED RELEASE ORAL DAILY PRN
Qty: 30 CAPSULE | Refills: 3 | Status: SHIPPED | OUTPATIENT
Start: 2022-08-15 | End: 2023-11-27 | Stop reason: SDUPTHER

## 2022-08-15 RX ORDER — CETIRIZINE HYDROCHLORIDE 10 MG/1
10 TABLET ORAL DAILY
Qty: 30 TABLET | Refills: 4 | Status: SHIPPED | OUTPATIENT
Start: 2022-08-15 | End: 2023-11-27 | Stop reason: SDUPTHER

## 2022-08-15 NOTE — PROGRESS NOTES
Hematology and Medical Oncology   Follow Up     08/15/2022    Primary Oncologic Diagnosis: Relapsed aggressive lymphoma    Telemedicine Documentation:  The patient location is: home  The chief complaint leading to consultation is: lymphoma follow up    Visit type: audiovisual    Face to Face time with patient: 25  30 minutes of total time spent on the encounter, which includes face to face time and non-face to face time preparing to see the patient (eg, review of tests), Obtaining and/or reviewing separately obtained history, Documenting clinical information in the electronic or other health record, Independently interpreting results (not separately reported) and communicating results to the patient/family/caregiver, or Care coordination (not separately reported).       Each patient to whom he or she provides medical services by telemedicine is:  (1) informed of the relationship between the physician and patient and the respective role of any other health care provider with respect to management of the patient; and (2) notified that he or she may decline to receive medical services by telemedicine and may withdraw from such care at any time.    History of Present Ilness:   Alonzo Rajput (Alonzo) is a pleasant 31 y.o.male who has currently receiving salvage RICE therapy. Completed cycle 4 at the end of December 2018. He presents today to discuss auto stem cell transplant options.    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   --9/11/2018 PET/CT shows Extensive hypermetabolic adenopathy involving the neck, chest, and lower abdomen/pelvis with index lesions detailed  above most suggestive of lymphoma.  Also increased activity involving adenoidal and palatine tonsillar tissues may be reactive or could indicate lymphoma involvement.  There is diffuse mild increased activity within the spleen.   --BM biopsy 9/12/2018 negative for disease  --Lymph node, left inguinal, excision on 9/13/2018  --RICE x 3 has been complicated with disseminated shingles, now recovered.  --PET 12/15/19 Abnormal focus of increased radiotracer uptake identified in the left submandibular gland, SUV max 6.69  --Cycle 4 of Rice on 12/26/18    Interval History:  Mr. Rajput relocated to [place] after [place]. Feels more tired than normal. Can sleep a full night and still feel exhaust in the morning    Recently started working out. Lifting weights and playing basketball.    He has not experienced any additional painless or enlarged nodes. Continues to deny night sweats, lymphadenopathy, unintentional weight loss.      PAST MEDICAL HISTORY:   Past Medical History:   Diagnosis Date    Allergy     Bronchitis     Cancer     lymphoma    Candida esophagitis     Diffuse large B cell lymphoma     DLBCL (diffuse large B cell lymphoma) 11/5/2018    Ear infection     Encounter for blood transfusion     Fever blister     Hepatitis B     HIV (human immunodeficiency virus infection)     UGI bleed        PAST SURGICAL HISTORY:   Past Surgical History:   Procedure Laterality Date    BONE MARROW BIOPSY Right 4/25/2019    Procedure: Biopsy-bone marrow;  Surgeon: Sarahi Garcia MD;  Location: [address];  Service: Orthopedics;  Laterality: Right;    ESOPHAGOGASTRODUODENOSCOPY      INSERTION OF TUNNELED CENTRAL VENOUS CATHETER (CVC) WITH SUBCUTANEOUS PORT Left 9/13/2018    Procedure: [id number];  Surgeon: Luis Bogran-Reyes, MD;  Location: [facility];  Service: General;  Laterality: Left;    LYMPH NODE BIOPSY Left 9/13/2018    Procedure: BIOPSY, LYMPH NODE;  Surgeon: Luis Bogran-Reyes, MD;   Location: [facility];  Service: General;  Laterality: Left;  inguinal lymph node excisional biopsy    port a cath removal      PORTACATH PLACEMENT      UPPER GASTROINTESTINAL ENDOSCOPY         PAST SOCIAL HISTORY:   reports that he quit smoking about 6 years ago. His smoking use included cigarettes. He quit after 2.00 years of use. He has never used smokeless tobacco. He reports current alcohol use. He reports current drug use. Frequency: 4.00 times per week. Drug: Marijuana.    FAMILY HISTORY:  Family History   Problem Relation Age of Onset    No Known Problems Mother     No Known Problems Father     No Known Problems Sister     No Known Problems Brother     Lupus Sister     No Known Problems Sister     No Known Problems Brother     No Known Problems Brother        CURRENT MEDICATIONS:   Current Outpatient Medications   Medication Sig    abacavir-dolutegravir-lamivud (TRIUMEQ) 600- mg Tab Take 1 tablet by mouth once daily.    cetirizine (ZYRTEC) 10 MG tablet Take 1 tablet (10 mg total) by mouth once daily.    diphenhydrAMINE (BENADRYL) 25 mg capsule Take 1 each (25 mg total) by mouth every 6 (six) hours as needed for Allergies (sinus congestion).    omeprazole (PRILOSEC) 20 MG capsule Take 1 capsule (20 mg total) by mouth daily as needed (heartburn).     No current facility-administered medications for this visit.     ALLERGIES:   Review of patient's allergies indicates:   Allergen Reactions    Amoxicillin Swelling         Review of Systems:     Review of Systems   Constitutional: Positive for fatigue. Negative for appetite change, chills, diaphoresis, fever and unexpected weight change.   HENT:   Negative for hearing loss, mouth sores, nosebleeds, sore throat, trouble swallowing and voice change.    Eyes: Negative for eye problems and icterus.   Respiratory: Negative for chest tightness, cough, hemoptysis, shortness of breath and wheezing.    Cardiovascular: Negative for chest pain, leg swelling  and palpitations.   Gastrointestinal: Negative for abdominal distention, abdominal pain, blood in stool, diarrhea, nausea and vomiting.   Endocrine: Negative for hot flashes.   Genitourinary: Negative for bladder incontinence, difficulty urinating, dysuria, frequency and hematuria.    Musculoskeletal: Negative for arthralgias, back pain, flank pain, gait problem, myalgias, neck pain and neck stiffness.   Skin: Negative for itching, rash and wound.   Neurological: Negative for dizziness, extremity weakness, gait problem, headaches, numbness, seizures and speech difficulty.   Hematological: Negative for adenopathy. Does not bruise/bleed easily.   Psychiatric/Behavioral: Negative for confusion, depression and sleep disturbance. The patient is not nervous/anxious.         Physical Exam:     Limited secondary to virtual visit    ECOG Performance Status: (foot note - ECOG PS provided by Eastern Cooperative Oncology Group) 0 - Asymptomatic    Karnofsky Performance Score:  100%- Normal, No Complaints, No Evidence of Disease    Labs:   Lab Results   Component Value Date    WBC 4.01 08/12/2022    HGB 14.4 08/12/2022    HCT 42.4 08/12/2022     08/12/2022    CHOL 157 03/14/2019    TRIG 263 (H) 03/14/2019    HDL 35 (L) 03/14/2019    ALT 26 08/12/2022    AST 24 08/12/2022     08/12/2022    K 3.9 08/12/2022     08/12/2022    CREATININE 1.3 08/12/2022    BUN 15 08/12/2022    CO2 28 08/12/2022    TSH 1.136 09/23/2015    INR 1.1 11/05/2018    HGBA1C 5.2 11/29/2018     LDH: 158    Imaging: Previous imaging has been reviewed    Assessment and Plan:     Mr. 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 He has delayed the eval process to the point that there is no data regarding transplant this far removed  --We will monitor q3 months for signs/symptoms of relapse    Fatigue  --Will check thyroid and hormone labs with next visit      HIV  --Currently on Triumeq with good disease control  --Previously on and off HARRT therapy, we discussed the importance of medication adherence      30 minutes were spent face to face with the patient to discuss the disease, natural history, treatment options and survival statistics. I have provided the patient with an opportunity to ask questions and have all questions answered to his satisfaction.       he will return to clinic in 3 months with labs, but knows to call in the interim if symptoms change or should a problem arise.        Sarahi Garcia MD  Hematology and Medical Oncology  Bone Marrow Transplant  [facility]          BMT Chart Routing      Follow up with physician . 1. labs in 3 months at [facility] 2.see me several days virtually after labs   Follow up with NABEEL    Infusion scheduling note    Injection scheduling note    Labs CBC, CMP, LDH and TSH   Lab interval:  Testosterone, T3,T4   Imaging None      Pharmacy appointment No pharmacy appointment needed      Other referrals No additional referrals needed

## 2022-11-17 ENCOUNTER — PATIENT MESSAGE (OUTPATIENT)
Dept: HEMATOLOGY/ONCOLOGY | Facility: CLINIC | Age: 32
End: 2022-11-17
Payer: MEDICARE

## 2023-01-05 ENCOUNTER — PATIENT MESSAGE (OUTPATIENT)
Dept: HEMATOLOGY/ONCOLOGY | Facility: CLINIC | Age: 33
End: 2023-01-05
Payer: MEDICARE

## 2023-01-13 ENCOUNTER — LAB VISIT (OUTPATIENT)
Dept: LAB | Facility: HOSPITAL | Age: 33
End: 2023-01-13
Attending: INTERNAL MEDICINE
Payer: MEDICARE

## 2023-01-13 DIAGNOSIS — B20 AIDS: ICD-10-CM

## 2023-01-13 PROCEDURE — 86593 SYPHILIS TEST NON-TREP QUANT: CPT | Performed by: INTERNAL MEDICINE

## 2023-01-13 PROCEDURE — 86803 HEPATITIS C AB TEST: CPT | Performed by: INTERNAL MEDICINE

## 2023-01-13 PROCEDURE — 36415 COLL VENOUS BLD VENIPUNCTURE: CPT | Performed by: INTERNAL MEDICINE

## 2023-01-13 PROCEDURE — 80053 COMPREHEN METABOLIC PANEL: CPT | Performed by: INTERNAL MEDICINE

## 2023-01-13 PROCEDURE — 86361 T CELL ABSOLUTE COUNT: CPT | Performed by: INTERNAL MEDICINE

## 2023-01-13 PROCEDURE — 86480 TB TEST CELL IMMUN MEASURE: CPT | Performed by: INTERNAL MEDICINE

## 2023-01-13 PROCEDURE — 86780 TREPONEMA PALLIDUM: CPT | Performed by: INTERNAL MEDICINE

## 2023-01-13 PROCEDURE — 86592 SYPHILIS TEST NON-TREP QUAL: CPT | Performed by: INTERNAL MEDICINE

## 2023-01-13 PROCEDURE — 87536 HIV-1 QUANT&REVRSE TRNSCRPJ: CPT | Performed by: INTERNAL MEDICINE

## 2023-01-13 PROCEDURE — 85025 COMPLETE CBC W/AUTO DIFF WBC: CPT | Performed by: INTERNAL MEDICINE

## 2023-01-14 LAB
ALBUMIN SERPL BCP-MCNC: 4.5 G/DL (ref 3.5–5.2)
ALP SERPL-CCNC: 104 U/L (ref 55–135)
ALT SERPL W/O P-5'-P-CCNC: 13 U/L (ref 10–44)
ANION GAP SERPL CALC-SCNC: 8 MMOL/L (ref 8–16)
AST SERPL-CCNC: 19 U/L (ref 10–40)
BASOPHILS # BLD AUTO: 0.01 K/UL (ref 0–0.2)
BASOPHILS NFR BLD: 0.2 % (ref 0–1.9)
BILIRUB SERPL-MCNC: 0.7 MG/DL (ref 0.1–1)
BUN SERPL-MCNC: 16 MG/DL (ref 6–20)
CALCIUM SERPL-MCNC: 10 MG/DL (ref 8.7–10.5)
CHLORIDE SERPL-SCNC: 102 MMOL/L (ref 95–110)
CO2 SERPL-SCNC: 25 MMOL/L (ref 23–29)
CREAT SERPL-MCNC: 1.3 MG/DL (ref 0.5–1.4)
DIFFERENTIAL METHOD: ABNORMAL
EOSINOPHIL # BLD AUTO: 0.2 K/UL (ref 0–0.5)
EOSINOPHIL NFR BLD: 3.7 % (ref 0–8)
ERYTHROCYTE [DISTWIDTH] IN BLOOD BY AUTOMATED COUNT: 13.1 % (ref 11.5–14.5)
EST. GFR  (NO RACE VARIABLE): >60 ML/MIN/1.73 M^2
GAMMA INTERFERON BACKGROUND BLD IA-ACNC: 0.04 IU/ML
GLUCOSE SERPL-MCNC: 88 MG/DL (ref 70–110)
HCT VFR BLD AUTO: 45.6 % (ref 40–54)
HCV AB SERPL QL IA: NORMAL
HGB BLD-MCNC: 15.1 G/DL (ref 14–18)
IMM GRANULOCYTES # BLD AUTO: 0.01 K/UL (ref 0–0.04)
IMM GRANULOCYTES NFR BLD AUTO: 0.2 % (ref 0–0.5)
LYMPHOCYTES # BLD AUTO: 2.1 K/UL (ref 1–4.8)
LYMPHOCYTES NFR BLD: 48.9 % (ref 18–48)
M TB IFN-G CD4+ BCKGRND COR BLD-ACNC: -0.01 IU/ML
MCH RBC QN AUTO: 31.9 PG (ref 27–31)
MCHC RBC AUTO-ENTMCNC: 33.1 G/DL (ref 32–36)
MCV RBC AUTO: 96 FL (ref 82–98)
MITOGEN IGNF BCKGRD COR BLD-ACNC: 5.74 IU/ML
MONOCYTES # BLD AUTO: 0.3 K/UL (ref 0.3–1)
MONOCYTES NFR BLD: 7.8 % (ref 4–15)
NEUTROPHILS # BLD AUTO: 1.7 K/UL (ref 1.8–7.7)
NEUTROPHILS NFR BLD: 39.2 % (ref 38–73)
NRBC BLD-RTO: 0 /100 WBC
PLATELET # BLD AUTO: 270 K/UL (ref 150–450)
PMV BLD AUTO: 10.6 FL (ref 9.2–12.9)
POTASSIUM SERPL-SCNC: 4 MMOL/L (ref 3.5–5.1)
PROT SERPL-MCNC: 8.2 G/DL (ref 6–8.4)
RBC # BLD AUTO: 4.73 M/UL (ref 4.6–6.2)
RPR SER QL: REACTIVE
RPR SER-TITR: ABNORMAL {TITER}
SODIUM SERPL-SCNC: 135 MMOL/L (ref 136–145)
TB GOLD PLUS: NEGATIVE
TB2 - NIL: -0.01 IU/ML
WBC # BLD AUTO: 4.38 K/UL (ref 3.9–12.7)

## 2023-01-17 ENCOUNTER — OFFICE VISIT (OUTPATIENT)
Dept: HEMATOLOGY/ONCOLOGY | Facility: CLINIC | Age: 33
End: 2023-01-17
Payer: MEDICARE

## 2023-01-17 DIAGNOSIS — C83.31 DIFFUSE LARGE B-CELL LYMPHOMA OF LYMPH NODES OF NECK: Primary | ICD-10-CM

## 2023-01-17 LAB
CD3+CD4+ CELLS # BLD: 472 CELLS/UL (ref 300–1400)
CD3+CD4+ CELLS NFR BLD: 22.6 % (ref 28–57)
HIV1 RNA # SERPL NAA+PROBE: <20 COPIES/ML
HIV1 RNA SERPL NAA+PROBE-LOG#: <1.3 LOG COPIES/ML
HIV1 RNA SERPL QL NAA+PROBE: DETECTED

## 2023-01-17 PROCEDURE — 99215 PR OFFICE/OUTPT VISIT, EST, LEVL V, 40-54 MIN: ICD-10-PCS | Mod: 95,,, | Performed by: INTERNAL MEDICINE

## 2023-01-17 PROCEDURE — 99215 OFFICE O/P EST HI 40 MIN: CPT | Mod: 95,,, | Performed by: INTERNAL MEDICINE

## 2023-01-17 NOTE — PROGRESS NOTES
Hematology and Medical Oncology   Follow Up     01/17/2023    Primary Oncologic Diagnosis: Relapsed aggressive lymphoma    Telemedicine Documentation:  The patient location is: home  The chief complaint leading to consultation is: lymphoma follow up    Visit type: audiovisual    Face to Face time with patient: 25  30 minutes of total time spent on the encounter, which includes face to face time and non-face to face time preparing to see the patient (eg, review of tests), Obtaining and/or reviewing separately obtained history, Documenting clinical information in the electronic or other health record, Independently interpreting results (not separately reported) and communicating results to the patient/family/caregiver, or Care coordination (not separately reported).       Each patient to whom he or she provides medical services by telemedicine is:  (1) informed of the relationship between the physician and patient and the respective role of any other health care provider with respect to management of the patient; and (2) notified that he or she may decline to receive medical services by telemedicine and may withdraw from such care at any time.    History of Present Ilness:   Alonzo Rajput (Alonzo) is a pleasant 32 y.o.male who has currently receiving salvage RICE therapy. Completed cycle 4 at the end of December 2018. He presents today to discuss auto stem cell transplant options.    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   --9/11/2018 PET/CT shows Extensive hypermetabolic adenopathy involving the neck, chest, and lower abdomen/pelvis with index lesions detailed  above most suggestive of lymphoma.  Also increased activity involving adenoidal and palatine tonsillar tissues may be reactive or could indicate lymphoma involvement.  There is diffuse mild increased activity within the spleen.   --BM biopsy 9/12/2018 negative for disease  --Lymph node, left inguinal, excision on 9/13/2018  --RICE x 3 has been complicated with disseminated shingles, now recovered.  --PET 12/15/19 Abnormal focus of increased radiotracer uptake identified in the left submandibular gland, SUV max 6.69  --Cycle 4 of Rice on 12/26/18    Interval History:  Mr. Rajput has lost weight, playing basketball. relocated to [place] after [place]. Feels more tired than normal. Can sleep a full night and still feel exhaust in the morning    Overall doing very good.    He has not experienced any additional painless or enlarged nodes. Continues to deny night sweats, lymphadenopathy, unintentional weight loss.      PAST MEDICAL HISTORY:   Past Medical History:   Diagnosis Date    Allergy     Bronchitis     Cancer     lymphoma    Candida esophagitis     Diffuse large B cell lymphoma     DLBCL (diffuse large B cell lymphoma) 11/5/2018    Ear infection     Encounter for blood transfusion     Fever blister     Hepatitis B     HIV (human immunodeficiency virus infection)     UGI bleed        PAST SURGICAL HISTORY:   Past Surgical History:   Procedure Laterality Date    BONE MARROW BIOPSY Right 4/25/2019    Procedure: Biopsy-bone marrow;  Surgeon: Sarahi Garcia MD;  Location: [address];  Service: Orthopedics;  Laterality: Right;    ESOPHAGOGASTRODUODENOSCOPY      INSERTION OF TUNNELED CENTRAL VENOUS CATHETER (CVC) WITH SUBCUTANEOUS PORT Left 9/13/2018    Procedure: HALKNVBVZ-YLES-D-CATH;  Surgeon: Luis Bogran-Reyes, MD;  Location: [facility];  Service: General;  Laterality: Left;    LYMPH NODE BIOPSY Left 9/13/2018    Procedure: BIOPSY, LYMPH NODE;  Surgeon: Luis Bogran-Reyes, MD;  Location:  [facility];  Service: General;  Laterality: Left;  inguinal lymph node excisional biopsy    port a cath removal      PORTACATH PLACEMENT      UPPER GASTROINTESTINAL ENDOSCOPY         PAST SOCIAL HISTORY:   reports that he quit smoking about 7 years ago. His smoking use included cigarettes. He has never used smokeless tobacco. He reports current alcohol use. He reports current drug use. Frequency: 4.00 times per week. Drug: Marijuana.    FAMILY HISTORY:  Family History   Problem Relation Age of Onset    No Known Problems Mother     No Known Problems Father     No Known Problems Sister     No Known Problems Brother     Lupus Sister     No Known Problems Sister     No Known Problems Brother     No Known Problems Brother        CURRENT MEDICATIONS:   Current Outpatient Medications   Medication Sig    abacavir-dolutegravir-lamivud (TRIUMEQ) 600- mg Tab TAKE ONE TABLET BY MOUTH EVERY DAY    cetirizine (ZYRTEC) 10 MG tablet Take 1 tablet (10 mg total) by mouth once daily.    diphenhydrAMINE (BENADRYL) 25 mg capsule Take 1 each (25 mg total) by mouth every 6 (six) hours as needed for Allergies (sinus congestion).    lisinopriL (ZESTRIL) 2.5 MG tablet Take 1 tablet (2.5 mg total) by mouth once daily. May be taken hs or in am    omeprazole (PRILOSEC) 20 MG capsule Take 1 capsule (20 mg total) by mouth daily as needed (heartburn).     No current facility-administered medications for this visit.     ALLERGIES:   Review of patient's allergies indicates:   Allergen Reactions    Amoxicillin Swelling         Review of Systems:     Review of Systems   Constitutional:  Positive for fatigue. Negative for appetite change, chills, diaphoresis, fever and unexpected weight change.   HENT:   Negative for hearing loss, mouth sores, nosebleeds, sore throat, trouble swallowing and voice change.    Eyes:  Negative for eye problems and icterus.   Respiratory:  Negative for chest tightness, cough, hemoptysis, shortness of  breath and wheezing.    Cardiovascular:  Negative for chest pain, leg swelling and palpitations.   Gastrointestinal:  Negative for abdominal distention, abdominal pain, blood in stool, diarrhea, nausea and vomiting.   Endocrine: Negative for hot flashes.   Genitourinary:  Negative for bladder incontinence, difficulty urinating, dysuria, frequency and hematuria.    Musculoskeletal:  Negative for arthralgias, back pain, flank pain, gait problem, myalgias, neck pain and neck stiffness.   Skin:  Negative for itching, rash and wound.   Neurological:  Negative for dizziness, extremity weakness, gait problem, headaches, numbness, seizures and speech difficulty.   Hematological:  Negative for adenopathy. Does not bruise/bleed easily.   Psychiatric/Behavioral:  Negative for confusion, depression and sleep disturbance. The patient is not nervous/anxious.       Physical Exam:     Limited secondary to virtual visit    ECOG Performance Status: (foot note - ECOG PS provided by Eastern Cooperative Oncology Group) 0 - Asymptomatic    Karnofsky Performance Score:  100%- Normal, No Complaints, No Evidence of Disease    Labs:   Lab Results   Component Value Date    WBC 4.38 01/13/2023    HGB 15.1 01/13/2023    HCT 45.6 01/13/2023     01/13/2023    CHOL 157 03/14/2019    TRIG 263 (H) 03/14/2019    HDL 35 (L) 03/14/2019    ALT 13 01/13/2023    AST 19 01/13/2023     (L) 01/13/2023    K 4.0 01/13/2023     01/13/2023    CREATININE 1.3 01/13/2023    BUN 16 01/13/2023    CO2 25 01/13/2023    TSH 1.136 09/23/2015    INR 1.1 11/05/2018    HGBA1C 5.2 11/29/2018     LDH: 158    Imaging: Previous imaging has been reviewed    Assessment and Plan:     Mr. 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 He has delayed the eval process to the point that there is no data regarding transplant this far removed  --We will monitor q3 months for signs/symptoms of relapse    Fatigue  --Will check thyroid and hormone labs with next visit      HIV  --Currently on Triumeq with good disease control  --Previously on and off HARRT therapy, we discussed the importance of medication adherence      30 minutes were spent face to face with the patient to discuss the disease, natural history, treatment options and survival statistics. I have provided the patient with an opportunity to ask questions and have all questions answered to his satisfaction.       he will return to clinic in 3 months with labs, but knows to call in the interim if symptoms change or should a problem arise.        Sarahi Garcia MD  Hematology and Medical Oncology  Bone Marrow Transplant  [facility]          BMT Chart Routing      Follow up with physician 3 months. 1. labs in 3 months: cbc,cmp, ldh 2.see me virtually 1-2 days after labs   Follow up with NABEEL    Provider visit type    Infusion scheduling note    Injection scheduling note    Labs    Imaging    Pharmacy appointment    Other referrals

## 2023-01-18 LAB — T PALLIDUM AB SER QL IF: REACTIVE

## 2023-03-15 ENCOUNTER — PATIENT MESSAGE (OUTPATIENT)
Dept: HEMATOLOGY/ONCOLOGY | Facility: CLINIC | Age: 33
End: 2023-03-15
Payer: MEDICARE

## 2023-04-04 ENCOUNTER — PATIENT MESSAGE (OUTPATIENT)
Dept: HEMATOLOGY/ONCOLOGY | Facility: CLINIC | Age: 33
End: 2023-04-04
Payer: MEDICARE

## 2023-04-18 ENCOUNTER — LAB VISIT (OUTPATIENT)
Dept: LAB | Facility: HOSPITAL | Age: 33
End: 2023-04-18
Attending: INTERNAL MEDICINE
Payer: MEDICARE

## 2023-04-18 DIAGNOSIS — C83.31 DIFFUSE LARGE B-CELL LYMPHOMA OF LYMPH NODES OF NECK: ICD-10-CM

## 2023-04-18 LAB
ALBUMIN SERPL BCP-MCNC: 3.8 G/DL (ref 3.5–5.2)
ALP SERPL-CCNC: 93 U/L (ref 55–135)
ALT SERPL W/O P-5'-P-CCNC: 18 U/L (ref 10–44)
ANION GAP SERPL CALC-SCNC: 9 MMOL/L (ref 8–16)
AST SERPL-CCNC: 16 U/L (ref 10–40)
BASOPHILS # BLD AUTO: 0.03 K/UL (ref 0–0.2)
BASOPHILS NFR BLD: 0.5 % (ref 0–1.9)
BILIRUB SERPL-MCNC: 0.4 MG/DL (ref 0.1–1)
BUN SERPL-MCNC: 20 MG/DL (ref 6–20)
CALCIUM SERPL-MCNC: 9.6 MG/DL (ref 8.7–10.5)
CHLORIDE SERPL-SCNC: 104 MMOL/L (ref 95–110)
CO2 SERPL-SCNC: 27 MMOL/L (ref 23–29)
CREAT SERPL-MCNC: 1.3 MG/DL (ref 0.5–1.4)
DIFFERENTIAL METHOD: ABNORMAL
EOSINOPHIL # BLD AUTO: 0.2 K/UL (ref 0–0.5)
EOSINOPHIL NFR BLD: 2.8 % (ref 0–8)
ERYTHROCYTE [DISTWIDTH] IN BLOOD BY AUTOMATED COUNT: 12.7 % (ref 11.5–14.5)
EST. GFR  (NO RACE VARIABLE): >60 ML/MIN/1.73 M^2
GLUCOSE SERPL-MCNC: 94 MG/DL (ref 70–110)
HCT VFR BLD AUTO: 44.3 % (ref 40–54)
HGB BLD-MCNC: 14.7 G/DL (ref 14–18)
IMM GRANULOCYTES # BLD AUTO: 0.05 K/UL (ref 0–0.04)
IMM GRANULOCYTES NFR BLD AUTO: 0.9 % (ref 0–0.5)
LDH SERPL L TO P-CCNC: 146 U/L (ref 110–260)
LYMPHOCYTES # BLD AUTO: 2.2 K/UL (ref 1–4.8)
LYMPHOCYTES NFR BLD: 37.7 % (ref 18–48)
MCH RBC QN AUTO: 32.3 PG (ref 27–31)
MCHC RBC AUTO-ENTMCNC: 33.2 G/DL (ref 32–36)
MCV RBC AUTO: 97 FL (ref 82–98)
MONOCYTES # BLD AUTO: 0.4 K/UL (ref 0.3–1)
MONOCYTES NFR BLD: 7.5 % (ref 4–15)
NEUTROPHILS # BLD AUTO: 2.9 K/UL (ref 1.8–7.7)
NEUTROPHILS NFR BLD: 50.6 % (ref 38–73)
NRBC BLD-RTO: 0 /100 WBC
PLATELET # BLD AUTO: 278 K/UL (ref 150–450)
PMV BLD AUTO: 10.1 FL (ref 9.2–12.9)
POTASSIUM SERPL-SCNC: 4.3 MMOL/L (ref 3.5–5.1)
PROT SERPL-MCNC: 7.2 G/DL (ref 6–8.4)
RBC # BLD AUTO: 4.55 M/UL (ref 4.6–6.2)
SODIUM SERPL-SCNC: 140 MMOL/L (ref 136–145)
WBC # BLD AUTO: 5.7 K/UL (ref 3.9–12.7)

## 2023-04-18 PROCEDURE — 85025 COMPLETE CBC W/AUTO DIFF WBC: CPT | Performed by: INTERNAL MEDICINE

## 2023-04-18 PROCEDURE — 36415 COLL VENOUS BLD VENIPUNCTURE: CPT | Performed by: INTERNAL MEDICINE

## 2023-04-18 PROCEDURE — 80053 COMPREHEN METABOLIC PANEL: CPT | Performed by: INTERNAL MEDICINE

## 2023-04-18 PROCEDURE — 83615 LACTATE (LD) (LDH) ENZYME: CPT | Performed by: INTERNAL MEDICINE

## 2023-04-19 ENCOUNTER — OFFICE VISIT (OUTPATIENT)
Dept: HEMATOLOGY/ONCOLOGY | Facility: CLINIC | Age: 33
End: 2023-04-19
Payer: MEDICARE

## 2023-04-19 DIAGNOSIS — C83.38 DIFFUSE LARGE B-CELL LYMPHOMA OF LYMPH NODES OF MULTIPLE REGIONS: Primary | ICD-10-CM

## 2023-04-19 DIAGNOSIS — J45.20 MILD INTERMITTENT ASTHMA WITHOUT COMPLICATION: ICD-10-CM

## 2023-04-19 PROCEDURE — 99215 PR OFFICE/OUTPT VISIT, EST, LEVL V, 40-54 MIN: ICD-10-PCS | Mod: 95,,, | Performed by: INTERNAL MEDICINE

## 2023-04-19 PROCEDURE — 99215 OFFICE O/P EST HI 40 MIN: CPT | Mod: 95,,, | Performed by: INTERNAL MEDICINE

## 2023-04-19 NOTE — PROGRESS NOTES
Hematology and Medical Oncology   Follow Up     04/19/2023    Primary Oncologic Diagnosis: Relapsed aggressive lymphoma    Telemedicine Documentation:  The patient location is: home  The chief complaint leading to consultation is: lymphoma follow up    Visit type: audiovisual    Face to Face time with patient: 25  30 minutes of total time spent on the encounter, which includes face to face time and non-face to face time preparing to see the patient (eg, review of tests), Obtaining and/or reviewing separately obtained history, Documenting clinical information in the electronic or other health record, Independently interpreting results (not separately reported) and communicating results to the patient/family/caregiver, or Care coordination (not separately reported).     Each patient to whom he or she provides medical services by telemedicine is:  (1) informed of the relationship between the physician and patient and the respective role of any other health care provider with respect to management of the patient; and (2) notified that he or she may decline to receive medical services by telemedicine and may withdraw from such care at any time.    History of Present Ilness:   Alonzo Rajput (Alonzo) is a pleasant 32 y.o.male who has currently receiving salvage RICE therapy. Completed cycle 4 at the end of December 2018. He presents today to discuss auto stem cell transplant options.    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   --9/11/2018 PET/CT shows Extensive hypermetabolic adenopathy involving the neck, chest, and lower abdomen/pelvis with index lesions detailed  above most suggestive of lymphoma.  Also increased activity involving adenoidal and palatine tonsillar tissues may be reactive or could indicate lymphoma involvement.  There is diffuse mild increased activity within the spleen.   --BM biopsy 9/12/2018 negative for disease  --Lymph node, left inguinal, excision on 9/13/2018  --RICE x 3 has been complicated with disseminated shingles, now recovered.  --PET 12/15/19 Abnormal focus of increased radiotracer uptake identified in the left submandibular gland, SUV max 6.69  --Cycle 4 of Rice on 12/26/18    Interval History:  Mr. Rajput has been doing well. Denies B symptoms. Continues to keep off the weight following intentional weight loss.       Recently experienced a sore right axilla for about a week then resolved. He did not feel a palpable mass       He has not experienced any additional painless or enlarged nodes. Continues to deny night sweats, lymphadenopathy, unintentional weight loss.      PAST MEDICAL HISTORY:   Past Medical History:   Diagnosis Date    Allergy     Bronchitis     Cancer     lymphoma    Candida esophagitis     Diffuse large B cell lymphoma     DLBCL (diffuse large B cell lymphoma) 11/5/2018    Ear infection     Encounter for blood transfusion     Fever blister     Hepatitis B     HIV (human immunodeficiency virus infection)     UGI bleed        PAST SURGICAL HISTORY:   Past Surgical History:   Procedure Laterality Date    BONE MARROW BIOPSY Right 4/25/2019    Procedure: Biopsy-bone marrow;  Surgeon: Sarahi Garcia MD;  Location: [address];  Service: Orthopedics;  Laterality: Right;    ESOPHAGOGASTRODUODENOSCOPY      INSERTION OF TUNNELED CENTRAL VENOUS CATHETER (CVC) WITH SUBCUTANEOUS PORT Left 9/13/2018    Procedure: [id number]-E-CATH;  Surgeon: Luis Bogran-Reyes, MD;  Location: [facility];  Service: General;  Laterality: Left;    LYMPH NODE BIOPSY Left 9/13/2018    Procedure: BIOPSY, LYMPH NODE;  Surgeon: Scooter  Bogran-Reyes, MD;  Location: [facility];  Service: General;  Laterality: Left;  inguinal lymph node excisional biopsy    port a cath removal      PORTACATH PLACEMENT      UPPER GASTROINTESTINAL ENDOSCOPY         PAST SOCIAL HISTORY:   reports that he quit smoking about 7 years ago. His smoking use included cigarettes. He has never used smokeless tobacco. He reports current alcohol use. He reports current drug use. Frequency: 4.00 times per week. Drug: Marijuana.    FAMILY HISTORY:  Family History   Problem Relation Age of Onset    No Known Problems Mother     No Known Problems Father     No Known Problems Sister     No Known Problems Brother     Lupus Sister     No Known Problems Sister     No Known Problems Brother     No Known Problems Brother        CURRENT MEDICATIONS:   Current Outpatient Medications   Medication Sig    abacavir-dolutegravir-lamivud (TRIUMEQ) 600- mg Tab Take 1 tablet by mouth once daily.    cetirizine (ZYRTEC) 10 MG tablet Take 1 tablet (10 mg total) by mouth once daily.    diphenhydrAMINE (BENADRYL) 25 mg capsule Take 1 each (25 mg total) by mouth every 6 (six) hours as needed for Allergies (sinus congestion).    lisinopriL (ZESTRIL) 2.5 MG tablet Take 1 tablet (2.5 mg total) by mouth once daily. May be taken hs or in am    omeprazole (PRILOSEC) 20 MG capsule Take 1 capsule (20 mg total) by mouth daily as needed (heartburn).     No current facility-administered medications for this visit.     ALLERGIES:   Review of patient's allergies indicates:   Allergen Reactions    Amoxicillin Swelling         Review of Systems:     Review of Systems   Constitutional:  Positive for fatigue. Negative for appetite change, chills, diaphoresis, fever and unexpected weight change.   HENT:   Negative for hearing loss, mouth sores, nosebleeds, sore throat, trouble swallowing and voice change.    Eyes:  Negative for eye problems and icterus.   Respiratory:  Negative for chest tightness, cough,  hemoptysis, shortness of breath and wheezing.    Cardiovascular:  Negative for chest pain, leg swelling and palpitations.   Gastrointestinal:  Negative for abdominal distention, abdominal pain, blood in stool, diarrhea, nausea and vomiting.   Endocrine: Negative for hot flashes.   Genitourinary:  Negative for bladder incontinence, difficulty urinating, dysuria, frequency and hematuria.    Musculoskeletal:  Negative for arthralgias, back pain, flank pain, gait problem, myalgias, neck pain and neck stiffness.   Skin:  Negative for itching, rash and wound.   Neurological:  Negative for dizziness, extremity weakness, gait problem, headaches, numbness, seizures and speech difficulty.   Hematological:  Negative for adenopathy. Does not bruise/bleed easily.   Psychiatric/Behavioral:  Negative for confusion, depression and sleep disturbance. The patient is not nervous/anxious.       Physical Exam:     Limited secondary to virtual visit    ECOG Performance Status: (foot note - ECOG PS provided by Eastern Cooperative Oncology Group) 0 - Asymptomatic    Karnofsky Performance Score:  100%- Normal, No Complaints, No Evidence of Disease    Labs:   Lab Results   Component Value Date    WBC 5.70 04/18/2023    HGB 14.7 04/18/2023    HCT 44.3 04/18/2023     04/18/2023    CHOL 157 03/14/2019    TRIG 263 (H) 03/14/2019    HDL 35 (L) 03/14/2019    ALT 18 04/18/2023    AST 16 04/18/2023     04/18/2023    K 4.3 04/18/2023     04/18/2023    CREATININE 1.3 04/18/2023    BUN 20 04/18/2023    CO2 27 04/18/2023    TSH 1.136 09/23/2015    INR 1.1 11/05/2018    HGBA1C 5.2 11/29/2018     LDH: 146    Imaging: Previous imaging has been reviewed    Assessment and Plan:     Mr. 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 He has delayed the eval process to the point that there is no data regarding transplant this far removed  --We will monitor q4 months for signs/symptoms of relapse    Fatigue  --Will check thyroid and hormone labs with next visit      HIV  --Currently on Triumeq with good disease control  --Previously on and off HARRT therapy, we discussed the importance of medication adherence      30 minutes were spent face to face with the patient to discuss the disease, natural history, treatment options and survival statistics. I have provided the patient with an opportunity to ask questions and have all questions answered to his satisfaction.       he will return to clinic in 4 months with labs, but knows to call in the interim if symptoms change or should a problem arise.        Sarahi Garcia MD  Hematology and Medical Oncology  Bone Marrow Transplant  [facility]          BMT Chart Routing      Follow up with physician 4 months. 1. labs [cbc,cmp,ldh] in 4 months 2. see me virtually a few days after labs   Follow up with NABEEL    Provider visit type    Infusion scheduling note    Injection scheduling note    Labs    Imaging    Pharmacy appointment    Other referrals

## 2023-05-19 ENCOUNTER — DOCUMENTATION ONLY (OUTPATIENT)
Dept: ONCOLOGY | Facility: HOSPITAL | Age: 33
End: 2023-05-19
Payer: MEDICARE

## 2023-05-19 NOTE — PROGRESS NOTES
"[facility] attempted to call pt. Phone recording stated "The person you are trying to reach right no is not accepting calls at this time. Please call back later." LCSW will re-attempt.         "

## 2023-05-22 ENCOUNTER — PATIENT MESSAGE (OUTPATIENT)
Dept: HEMATOLOGY/ONCOLOGY | Facility: CLINIC | Age: 33
End: 2023-05-22
Payer: MEDICARE

## 2023-05-22 ENCOUNTER — DOCUMENTATION ONLY (OUTPATIENT)
Dept: ONCOLOGY | Facility: HOSPITAL | Age: 33
End: 2023-05-22
Payer: MEDICARE

## 2023-05-22 NOTE — PROGRESS NOTES
"[facility] Phone recording stated "The person you are trying to reach right no is not accepting calls at this time. Please call back later." LCSW will re-attempt.   [facility] also attempted to contact pt's mother, Ghada Rajput, but recording states "voicemail is full."   LCSW sent a message to pt via [facility] patient portal.         Ghada Rajput- p. [phone]  "

## 2023-05-23 NOTE — PROGRESS NOTES
[facility] received an Epic message from pt stating that his phone is off and is requesting correspondence via secure [place] email.  [facility] sent pt the following email:     Good afternoon,  Unfortunately the [facility] need lauri is not open at this time. I attached applications for the [employer], BONNIE and [facility] [employer] grants. Please let me know if you are interested in any of these grants and I can assist with completing the applications.     Respectfully,     Lilliana Montanez [facility]  Oncology Social Worker License # [id number]  [facility]  [email or web address]  P. [phone]; F. [phone]     <[email or web address] application.[email or web address]>  <[email or web address] application.pdf>  <[facility] Emergency Lauri application (w.o CM letter).pdf>    Pt responded that he would like to apply for all three financial assistance programs. [facility] will complete applications when financial and household information is submitted.     Thank you, I can assist with completing the medical portion of the application. Please provide the financial and household information. If you are unable to enter the financial and household information on the application please provide the information via email and I will add it to the application.    Respectfully,     Lilliana Montanez [facility]  Oncology Social Worker License # [id number]  [facility]  [email or web address]  P. [phone]; F. [phone]

## 2023-05-24 ENCOUNTER — PATIENT MESSAGE (OUTPATIENT)
Dept: ONCOLOGY | Facility: HOSPITAL | Age: 33
End: 2023-05-24
Payer: MEDICARE

## 2023-05-24 ENCOUNTER — DOCUMENTATION ONLY (OUTPATIENT)
Dept: ONCOLOGY | Facility: HOSPITAL | Age: 33
End: 2023-05-24
Payer: MEDICARE

## 2023-05-25 NOTE — PROGRESS NOTES
[facility] sent pt the following follow up message via Epic messenger on Wednesday, 5/24/23.    Good afternoon,  Following up on the financial and household information needed to complete the financial assistance applications that were sent to your email.     Respectfully,     [facility]illiana Montanez [facility]  Oncology Social Worker License # [id number]  [facility]  [email or web address]  P. [phone]; F.     VERENICE will follow and assist.

## 2023-07-19 ENCOUNTER — PATIENT MESSAGE (OUTPATIENT)
Dept: HEMATOLOGY/ONCOLOGY | Facility: CLINIC | Age: 33
End: 2023-07-19
Payer: MEDICARE

## 2023-09-12 ENCOUNTER — PATIENT MESSAGE (OUTPATIENT)
Dept: HEMATOLOGY/ONCOLOGY | Facility: CLINIC | Age: 33
End: 2023-09-12
Payer: MEDICARE

## 2023-09-12 ENCOUNTER — TELEPHONE (OUTPATIENT)
Dept: HEMATOLOGY/ONCOLOGY | Facility: CLINIC | Age: 33
End: 2023-09-12
Payer: MEDICARE

## 2023-09-12 DIAGNOSIS — C83.38 DIFFUSE LARGE B-CELL LYMPHOMA OF LYMPH NODES OF MULTIPLE REGIONS: Primary | ICD-10-CM

## 2023-10-02 ENCOUNTER — LAB VISIT (OUTPATIENT)
Dept: LAB | Facility: HOSPITAL | Age: 33
End: 2023-10-02
Attending: INTERNAL MEDICINE
Payer: MEDICARE

## 2023-10-02 DIAGNOSIS — C83.38 DIFFUSE LARGE B-CELL LYMPHOMA OF LYMPH NODES OF MULTIPLE REGIONS: ICD-10-CM

## 2023-10-02 DIAGNOSIS — C83.30 DIFFUSE LARGE B-CELL LYMPHOMA, UNSPECIFIED BODY REGION: ICD-10-CM

## 2023-10-02 LAB
ALBUMIN SERPL BCP-MCNC: 4.3 G/DL (ref 3.5–5.2)
ALP SERPL-CCNC: 102 U/L (ref 55–135)
ALT SERPL W/O P-5'-P-CCNC: 34 U/L (ref 10–44)
ANION GAP SERPL CALC-SCNC: 11 MMOL/L (ref 8–16)
AST SERPL-CCNC: 29 U/L (ref 10–40)
BASOPHILS # BLD AUTO: 0.02 K/UL (ref 0–0.2)
BASOPHILS NFR BLD: 0.4 % (ref 0–1.9)
BILIRUB SERPL-MCNC: 0.5 MG/DL (ref 0.1–1)
BUN SERPL-MCNC: 18 MG/DL (ref 6–20)
CALCIUM SERPL-MCNC: 9.8 MG/DL (ref 8.7–10.5)
CHLORIDE SERPL-SCNC: 102 MMOL/L (ref 95–110)
CO2 SERPL-SCNC: 26 MMOL/L (ref 23–29)
CREAT SERPL-MCNC: 1.4 MG/DL (ref 0.5–1.4)
DIFFERENTIAL METHOD: ABNORMAL
EOSINOPHIL # BLD AUTO: 0.2 K/UL (ref 0–0.5)
EOSINOPHIL NFR BLD: 4.3 % (ref 0–8)
ERYTHROCYTE [DISTWIDTH] IN BLOOD BY AUTOMATED COUNT: 12.4 % (ref 11.5–14.5)
EST. GFR  (NO RACE VARIABLE): >60 ML/MIN/1.73 M^2
GLUCOSE SERPL-MCNC: 88 MG/DL (ref 70–110)
HCT VFR BLD AUTO: 43.2 % (ref 40–54)
HGB BLD-MCNC: 14.7 G/DL (ref 14–18)
IMM GRANULOCYTES # BLD AUTO: 0.03 K/UL (ref 0–0.04)
IMM GRANULOCYTES NFR BLD AUTO: 0.5 % (ref 0–0.5)
LDH SERPL L TO P-CCNC: 189 U/L (ref 110–260)
LYMPHOCYTES # BLD AUTO: 2.4 K/UL (ref 1–4.8)
LYMPHOCYTES NFR BLD: 42.2 % (ref 18–48)
MCH RBC QN AUTO: 31.7 PG (ref 27–31)
MCHC RBC AUTO-ENTMCNC: 34 G/DL (ref 32–36)
MCV RBC AUTO: 93 FL (ref 82–98)
MONOCYTES # BLD AUTO: 0.4 K/UL (ref 0.3–1)
MONOCYTES NFR BLD: 6.4 % (ref 4–15)
NEUTROPHILS # BLD AUTO: 2.6 K/UL (ref 1.8–7.7)
NEUTROPHILS NFR BLD: 46.2 % (ref 38–73)
NRBC BLD-RTO: 0 /100 WBC
PLATELET # BLD AUTO: 264 K/UL (ref 150–450)
PMV BLD AUTO: 9.4 FL (ref 9.2–12.9)
POTASSIUM SERPL-SCNC: 4.1 MMOL/L (ref 3.5–5.1)
PROT SERPL-MCNC: 8.3 G/DL (ref 6–8.4)
RBC # BLD AUTO: 4.63 M/UL (ref 4.6–6.2)
SODIUM SERPL-SCNC: 139 MMOL/L (ref 136–145)
WBC # BLD AUTO: 5.64 K/UL (ref 3.9–12.7)

## 2023-10-02 PROCEDURE — 85025 COMPLETE CBC W/AUTO DIFF WBC: CPT | Performed by: INTERNAL MEDICINE

## 2023-10-02 PROCEDURE — 83615 LACTATE (LD) (LDH) ENZYME: CPT | Performed by: INTERNAL MEDICINE

## 2023-10-02 PROCEDURE — 36415 COLL VENOUS BLD VENIPUNCTURE: CPT | Performed by: INTERNAL MEDICINE

## 2023-10-02 PROCEDURE — 80053 COMPREHEN METABOLIC PANEL: CPT | Performed by: INTERNAL MEDICINE

## 2023-10-03 ENCOUNTER — OFFICE VISIT (OUTPATIENT)
Dept: HEMATOLOGY/ONCOLOGY | Facility: CLINIC | Age: 33
End: 2023-10-03
Payer: MEDICARE

## 2023-10-03 DIAGNOSIS — C83.39 DIFFUSE LARGE B-CELL LYMPHOMA OF SOLID ORGAN EXCLUDING SPLEEN: ICD-10-CM

## 2023-10-03 DIAGNOSIS — C85.90 NON-HODGKIN'S LYMPHOMA IN RELAPSE: Primary | ICD-10-CM

## 2023-10-03 DIAGNOSIS — C83.38 DIFFUSE LARGE B-CELL LYMPHOMA OF LYMPH NODES OF MULTIPLE REGIONS: ICD-10-CM

## 2023-10-03 PROCEDURE — 99215 OFFICE O/P EST HI 40 MIN: CPT | Mod: 95,,, | Performed by: INTERNAL MEDICINE

## 2023-10-03 PROCEDURE — 99215 PR OFFICE/OUTPT VISIT, EST, LEVL V, 40-54 MIN: ICD-10-PCS | Mod: 95,,, | Performed by: INTERNAL MEDICINE

## 2023-10-03 NOTE — PROGRESS NOTES
Hematology and Medical Oncology   Follow Up     10/03//2023    Primary Oncologic Diagnosis: Relapsed aggressive lymphoma    Telemedicine Documentation:  The patient location is: home  The chief complaint leading to consultation is: lymphoma follow up    Visit type: audiovisual    Face to Face time with patient: 25  30 minutes of total time spent on the encounter, which includes face to face time and non-face to face time preparing to see the patient (eg, review of tests), Obtaining and/or reviewing separately obtained history, Documenting clinical information in the electronic or other health record, Independently interpreting results (not separately reported) and communicating results to the patient/family/caregiver, or Care coordination (not separately reported).     Each patient to whom he or she provides medical services by telemedicine is:  (1) informed of the relationship between the physician and patient and the respective role of any other health care provider with respect to management of the patient; and (2) notified that he or she may decline to receive medical services by telemedicine and may withdraw from such care at any time.    History of Present Ilness:   Alonzo Rajput (Alonzo) is a pleasant 32 y.o.male who has currently receiving salvage RICE therapy. Completed cycle 4 at the end of December 2018. He presents today to discuss auto stem cell transplant options.    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   --9/11/2018 PET/CT shows Extensive hypermetabolic adenopathy involving the neck, chest, and lower abdomen/pelvis with index lesions detailed  above most suggestive of lymphoma.  Also increased activity involving adenoidal and palatine tonsillar tissues may be reactive or could indicate lymphoma involvement.  There is diffuse mild increased activity within the spleen.   --BM biopsy 9/12/2018 negative for disease  --Lymph node, left inguinal, excision on 9/13/2018  --RICE x 3 has been complicated with disseminated shingles, now recovered.  --PET 12/15/19 Abnormal focus of increased radiotracer uptake identified in the left submandibular gland, SUV max 6.69  --Cycle 4 of Rice on 12/26/18    Interval History:  Mr. Rajput has been doing well. Denies B symptoms. 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 Continues to keep off the weight following intentional weight loss.       He has not experienced any additional painless or enlarged nodes. Continues to deny night sweats, lymphadenopathy, unintentional weight loss.      PAST MEDICAL HISTORY:   Past Medical History:   Diagnosis Date    Allergy     Bronchitis     Cancer     lymphoma    Candida esophagitis     Diffuse large B cell lymphoma     DLBCL (diffuse large B cell lymphoma) 11/5/2018    Ear infection     Encounter for blood transfusion     Fever blister     Hepatitis B     HIV (human immunodeficiency virus infection)     UGI bleed        PAST SURGICAL HISTORY:   Past Surgical History:   Procedure Laterality Date    BONE MARROW BIOPSY Right 4/25/2019    Procedure: Biopsy-bone marrow;  Surgeon: Sarahi Garcia MD;  Location: [address];  Service: Orthopedics;  Laterality: Right;    ESOPHAGOGASTRODUODENOSCOPY      INSERTION OF TUNNELED CENTRAL VENOUS CATHETER (CVC) WITH SUBCUTANEOUS PORT Left 9/13/2018    Procedure: [id number];  Surgeon: Luis Bogran-Reyes, MD;  Location: [facility];  Service: General;  Laterality: Left;    LYMPH NODE BIOPSY Left 9/13/2018    Procedure: BIOPSY, LYMPH NODE;  Surgeon: Luis Bogran-Reyes, MD;  Location: [facility];  Service: General;  Laterality: Left;   inguinal lymph node excisional biopsy    port a cath removal      PORTACATH PLACEMENT      UPPER GASTROINTESTINAL ENDOSCOPY         PAST SOCIAL HISTORY:   reports that he quit smoking about 8 years ago. His smoking use included cigarettes. He started smoking about 10 years ago. He has never used smokeless tobacco. He reports current alcohol use. He reports current drug use. Frequency: 4.00 times per week. Drug: Marijuana.    FAMILY HISTORY:  Family History   Problem Relation Age of Onset    No Known Problems Mother     No Known Problems Father     No Known Problems Sister     No Known Problems Brother     Lupus Sister     No Known Problems Sister     No Known Problems Brother     No Known Problems Brother        CURRENT MEDICATIONS:   Current Outpatient Medications   Medication Sig    cetirizine (ZYRTEC) 10 MG tablet Take 1 tablet (10 mg total) by mouth once daily.    diphenhydrAMINE (BENADRYL) 25 mg capsule Take 1 each (25 mg total) by mouth every 6 (six) hours as needed for Allergies (sinus congestion).    lisinopriL (ZESTRIL) 2.5 MG tablet Take 1 tablet (2.5 mg total) by mouth once daily. May be taken hs or in am    omeprazole (PRILOSEC) 20 MG capsule Take 1 capsule (20 mg total) by mouth daily as needed (heartburn).    TRIUMEQ 600- mg Tab TAKE ONE TABLET BY MOUTH EVERY DAY     No current facility-administered medications for this visit.     ALLERGIES:   Review of patient's allergies indicates:   Allergen Reactions    Amoxicillin Swelling         Review of Systems:     Review of Systems   Constitutional:  Positive for fatigue. Negative for appetite change, chills, diaphoresis, fever and unexpected weight change.   HENT:   Negative for hearing loss, mouth sores, nosebleeds, sore throat, trouble swallowing and voice change.    Eyes:  Negative for eye problems and icterus.   Respiratory:  Negative for chest tightness, cough, hemoptysis, shortness of breath and wheezing.    Cardiovascular:   Negative for chest pain, leg swelling and palpitations.   Gastrointestinal:  Negative for abdominal distention, abdominal pain, blood in stool, diarrhea, nausea and vomiting.   Endocrine: Negative for hot flashes.   Genitourinary:  Negative for bladder incontinence, difficulty urinating, dysuria, frequency and hematuria.    Musculoskeletal:  Negative for arthralgias, back pain, flank pain, gait problem, myalgias, neck pain and neck stiffness.   Skin:  Negative for itching, rash and wound.   Neurological:  Negative for dizziness, extremity weakness, gait problem, headaches, numbness, seizures and speech difficulty.   Hematological:  Negative for adenopathy. Does not bruise/bleed easily.   Psychiatric/Behavioral:  Negative for confusion, depression and sleep disturbance. The patient is not nervous/anxious.         Physical Exam:     Limited secondary to virtual visit    ECOG Performance Status: (foot note - ECOG PS provided by Eastern Cooperative Oncology Group) 0 - Asymptomatic    Karnofsky Performance Score:  100%- Normal, No Complaints, No Evidence of Disease    Labs:   Lab Results   Component Value Date    WBC 5.64 10/02/2023    HGB 14.7 10/02/2023    HCT 43.2 10/02/2023     10/02/2023    CHOL 157 03/14/2019    TRIG 263 (H) 03/14/2019    HDL 35 (L) 03/14/2019    ALT 34 10/02/2023    AST 29 10/02/2023     10/02/2023    K 4.1 10/02/2023     10/02/2023    CREATININE 1.4 10/02/2023    BUN 18 10/02/2023    CO2 26 10/02/2023    TSH 1.136 09/23/2015    INR 1.1 11/05/2018    HGBA1C 5.2 11/29/2018     LDH: 146    Imaging: Previous imaging has been reviewed    Assessment and Plan:     Mr. Rajput is a 32 year old male with relapsed aggressive B Cell lymphoma currently receiving salvage chemotherapy    High Grade B Cell Lymphoma -- Stage IV  --Initial pathology in 2015 was most consistent with Burkitt's Lymphoma  --Received cycle 4 of RICE in the end of December 2018  --Prolonged dental clearance, not yet  cleared  --Given the fact that we are 5years post therapy, there is unclear benefit of an ASCT at this point. He has delayed the eval process to the point that there is no data regarding transplant this far removed  --We will monitor q4 months for signs/symptoms of relapse    Fatigue  --Will check thyroid and hormone labs with next visit      HIV  --Currently on Triumeq with good disease control  --Previously on and off HARRT therapy, we discussed the importance of medication adherence      30 minutes were spent face to face with the patient to discuss the disease, natural history, treatment options and survival statistics. I have provided the patient with an opportunity to ask questions and have all questions answered to his satisfaction.       he will return to clinic in 4 months with labs, but knows to call in the interim if symptoms change or should a problem arise.        Sarahi Garcia MD  Hematology and Medical Oncology  Bone Marrow Transplant  [facility]          BMT Chart Routing      Follow up with physician 4 months. 1. see me in 4 months with cbc,cmp, ldh   Follow up with NABEEL    Provider visit type    Infusion scheduling note    Injection scheduling note    Labs    Imaging    Pharmacy appointment    Other referrals

## 2023-11-27 DIAGNOSIS — C83.38 DIFFUSE LARGE B-CELL LYMPHOMA OF LYMPH NODES OF MULTIPLE REGIONS: ICD-10-CM

## 2023-11-27 DIAGNOSIS — J30.2 SEASONAL ALLERGIC RHINITIS, UNSPECIFIED TRIGGER: ICD-10-CM

## 2023-11-30 RX ORDER — CETIRIZINE HYDROCHLORIDE 10 MG/1
10 TABLET ORAL DAILY
Qty: 30 TABLET | Refills: 4 | Status: SHIPPED | OUTPATIENT
Start: 2023-11-30

## 2023-11-30 RX ORDER — OMEPRAZOLE 20 MG/1
20 CAPSULE, DELAYED RELEASE ORAL DAILY PRN
Qty: 30 CAPSULE | Refills: 3 | Status: SHIPPED | OUTPATIENT
Start: 2023-11-30 | End: 2024-11-29

## 2023-12-05 DIAGNOSIS — B20 AIDS: Primary | ICD-10-CM

## 2023-12-05 DIAGNOSIS — Z86.19 HISTORY OF HEPATITIS B: ICD-10-CM

## 2023-12-05 DIAGNOSIS — Z86.19 HISTORY OF SYPHILIS: ICD-10-CM

## 2023-12-06 ENCOUNTER — LAB VISIT (OUTPATIENT)
Dept: LAB | Facility: HOSPITAL | Age: 33
End: 2023-12-06
Attending: INTERNAL MEDICINE
Payer: MEDICARE

## 2023-12-06 DIAGNOSIS — Z86.19 HISTORY OF SYPHILIS: ICD-10-CM

## 2023-12-06 DIAGNOSIS — B20 AIDS: ICD-10-CM

## 2023-12-06 DIAGNOSIS — Z86.19 HISTORY OF HEPATITIS B: ICD-10-CM

## 2023-12-06 LAB
ALBUMIN SERPL BCP-MCNC: 4.2 G/DL (ref 3.5–5.2)
ALP SERPL-CCNC: 108 U/L (ref 55–135)
ALT SERPL W/O P-5'-P-CCNC: 50 U/L (ref 10–44)
ANION GAP SERPL CALC-SCNC: 12 MMOL/L (ref 8–16)
AST SERPL-CCNC: 34 U/L (ref 10–40)
BASOPHILS # BLD AUTO: 0.06 K/UL (ref 0–0.2)
BASOPHILS NFR BLD: 1.5 % (ref 0–1.9)
BILIRUB SERPL-MCNC: 0.6 MG/DL (ref 0.1–1)
BUN SERPL-MCNC: 9 MG/DL (ref 6–20)
CALCIUM SERPL-MCNC: 9.9 MG/DL (ref 8.7–10.5)
CHLORIDE SERPL-SCNC: 103 MMOL/L (ref 95–110)
CO2 SERPL-SCNC: 25 MMOL/L (ref 23–29)
CREAT SERPL-MCNC: 1.2 MG/DL (ref 0.5–1.4)
DIFFERENTIAL METHOD: ABNORMAL
EOSINOPHIL # BLD AUTO: 0.3 K/UL (ref 0–0.5)
EOSINOPHIL NFR BLD: 7 % (ref 0–8)
ERYTHROCYTE [DISTWIDTH] IN BLOOD BY AUTOMATED COUNT: 12.8 % (ref 11.5–14.5)
EST. GFR  (NO RACE VARIABLE): >60 ML/MIN/1.73 M^2
GLUCOSE SERPL-MCNC: 75 MG/DL (ref 70–110)
HCT VFR BLD AUTO: 42.8 % (ref 40–54)
HGB BLD-MCNC: 15.1 G/DL (ref 14–18)
IMM GRANULOCYTES # BLD AUTO: 0.01 K/UL (ref 0–0.04)
IMM GRANULOCYTES NFR BLD AUTO: 0.2 % (ref 0–0.5)
LYMPHOCYTES # BLD AUTO: 1.8 K/UL (ref 1–4.8)
LYMPHOCYTES NFR BLD: 45.5 % (ref 18–48)
MCH RBC QN AUTO: 31.6 PG (ref 27–31)
MCHC RBC AUTO-ENTMCNC: 35.3 G/DL (ref 32–36)
MCV RBC AUTO: 90 FL (ref 82–98)
MONOCYTES # BLD AUTO: 0.4 K/UL (ref 0.3–1)
MONOCYTES NFR BLD: 10 % (ref 4–15)
NEUTROPHILS # BLD AUTO: 1.4 K/UL (ref 1.8–7.7)
NEUTROPHILS NFR BLD: 35.8 % (ref 38–73)
NRBC BLD-RTO: 0 /100 WBC
PLATELET # BLD AUTO: 238 K/UL (ref 150–450)
PLATELET BLD QL SMEAR: ABNORMAL
PMV BLD AUTO: 10 FL (ref 9.2–12.9)
POTASSIUM SERPL-SCNC: 3.8 MMOL/L (ref 3.5–5.1)
PROT SERPL-MCNC: 8.5 G/DL (ref 6–8.4)
RBC # BLD AUTO: 4.78 M/UL (ref 4.6–6.2)
SODIUM SERPL-SCNC: 140 MMOL/L (ref 136–145)
WBC # BLD AUTO: 4.02 K/UL (ref 3.9–12.7)

## 2023-12-06 PROCEDURE — 86593 SYPHILIS TEST NON-TREP QUANT: CPT | Performed by: INTERNAL MEDICINE

## 2023-12-06 PROCEDURE — 86780 TREPONEMA PALLIDUM: CPT | Performed by: INTERNAL MEDICINE

## 2023-12-06 PROCEDURE — 87900 PHENOTYPE INFECT AGENT DRUG: CPT | Performed by: INTERNAL MEDICINE

## 2023-12-06 PROCEDURE — 87901 NFCT AGT GNTYP ALYS HIV1 REV: CPT | Performed by: INTERNAL MEDICINE

## 2023-12-06 PROCEDURE — 80053 COMPREHEN METABOLIC PANEL: CPT | Performed by: INTERNAL MEDICINE

## 2023-12-06 PROCEDURE — 85025 COMPLETE CBC W/AUTO DIFF WBC: CPT | Performed by: INTERNAL MEDICINE

## 2023-12-06 PROCEDURE — 86361 T CELL ABSOLUTE COUNT: CPT | Performed by: INTERNAL MEDICINE

## 2023-12-06 PROCEDURE — 36415 COLL VENOUS BLD VENIPUNCTURE: CPT | Performed by: INTERNAL MEDICINE

## 2023-12-06 PROCEDURE — 87536 HIV-1 QUANT&REVRSE TRNSCRPJ: CPT | Performed by: INTERNAL MEDICINE

## 2023-12-06 PROCEDURE — 86592 SYPHILIS TEST NON-TREP QUAL: CPT | Performed by: INTERNAL MEDICINE

## 2023-12-06 PROCEDURE — 81381 HLA I TYPING 1 ALLELE HR: CPT | Performed by: INTERNAL MEDICINE

## 2023-12-07 LAB
CD3+CD4+ CELLS # BLD: 533 CELLS/UL (ref 300–1400)
CD3+CD4+ CELLS NFR BLD: 27.7 % (ref 28–57)
HIV1 RNA # SERPL NAA+PROBE: NOT DETECTED COPIES/ML
HIV1 RNA SERPL QL NAA+PROBE: NOT DETECTED
RPR SER QL: REACTIVE
RPR SER-TITR: ABNORMAL {TITER}

## 2023-12-08 LAB — T PALLIDUM AB SER QL IF: REACTIVE

## 2023-12-11 ENCOUNTER — OFFICE VISIT (OUTPATIENT)
Dept: INFECTIOUS DISEASES | Facility: CLINIC | Age: 33
End: 2023-12-11
Payer: MEDICARE

## 2023-12-11 ENCOUNTER — LAB VISIT (OUTPATIENT)
Dept: LAB | Facility: HOSPITAL | Age: 33
End: 2023-12-11
Attending: INTERNAL MEDICINE
Payer: MEDICARE

## 2023-12-11 VITALS
HEIGHT: 69 IN | DIASTOLIC BLOOD PRESSURE: 80 MMHG | WEIGHT: 176.56 LBS | SYSTOLIC BLOOD PRESSURE: 122 MMHG | BODY MASS INDEX: 26.15 KG/M2

## 2023-12-11 DIAGNOSIS — R11.0 NAUSEA WITH HIVES: ICD-10-CM

## 2023-12-11 DIAGNOSIS — Z86.19 HISTORY OF SYPHILIS: ICD-10-CM

## 2023-12-11 DIAGNOSIS — B20 AIDS: ICD-10-CM

## 2023-12-11 DIAGNOSIS — L50.9 NAUSEA WITH HIVES: ICD-10-CM

## 2023-12-11 DIAGNOSIS — C83.38 DIFFUSE LARGE B-CELL LYMPHOMA OF LYMPH NODES OF MULTIPLE REGIONS: ICD-10-CM

## 2023-12-11 DIAGNOSIS — B20 AIDS: Primary | ICD-10-CM

## 2023-12-11 PROCEDURE — 99212 OFFICE O/P EST SF 10 MIN: CPT | Mod: PBBFAC | Performed by: INTERNAL MEDICINE

## 2023-12-11 PROCEDURE — 99214 OFFICE O/P EST MOD 30 MIN: CPT | Mod: S$PBB,,, | Performed by: INTERNAL MEDICINE

## 2023-12-11 PROCEDURE — 99999 PR PBB SHADOW E&M-EST. PATIENT-LVL II: CPT | Mod: PBBFAC,,, | Performed by: INTERNAL MEDICINE

## 2023-12-11 PROCEDURE — 36415 COLL VENOUS BLD VENIPUNCTURE: CPT | Performed by: INTERNAL MEDICINE

## 2023-12-11 PROCEDURE — 80074 ACUTE HEPATITIS PANEL: CPT | Performed by: INTERNAL MEDICINE

## 2023-12-11 PROCEDURE — 99214 PR OFFICE/OUTPT VISIT, EST, LEVL IV, 30-39 MIN: ICD-10-PCS | Mod: S$PBB,,, | Performed by: INTERNAL MEDICINE

## 2023-12-11 PROCEDURE — 99999 PR PBB SHADOW E&M-EST. PATIENT-LVL II: ICD-10-PCS | Mod: PBBFAC,,, | Performed by: INTERNAL MEDICINE

## 2023-12-11 NOTE — ASSESSMENT & PLAN NOTE
Treated -in 2015   Will monitor -  The RPR is likely due to serofast state    10/30/2019 1/15/2020 5/6/2020 7/22/2020 11/4/2020 2/4/2021   RPR Quantitative 1:2 !  1:2 !  1:4 !  1:4 !  1:2 !  1:4 !       5/26/2022 1/13/2023 12/6/2023   RPR Quantitative 1:2 !  1:2 !  1:2 !         He might need LP to rule out Neurosyphillis

## 2023-12-11 NOTE — ASSESSMENT & PLAN NOTE
The viral load remains undetectable and the CD4 count remains steady .  He has good virologic and immunologic control of HIV.   He is on Trimeuq

## 2023-12-11 NOTE — PROGRESS NOTES
Subjective:       Patient ID: Alonzo Rajput is a 33 y.o. male.    Chief Complaint: Follow-up and HIV Positive/AIDS     HPI  33 year old with PMH as listed below.  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  Labs-        Component Ref Range & Units 5 d ago 11 mo ago   HIV-1 ULTRAQUANT <20 Copies/mL Not Detected <20 Abnormal    HIV-1 RNA, QUALITATIVE Not Detected Not Detected Detected Abnormal  CM     CD4 % [place] T Cell 28 - 57 % 27.7 Low  22.6 Low  21.3 Low  21.2 Low  21.7 Low  20.5 Low  20.1 Low    Absolute CD4 300 - 1400 cells/ul 533 472               Component 5 d ago   RPR Quantitative 1:2 Abnormal      He partner does not have HIV  He is on Trimeuq,      Past Medical History:   Diagnosis Date    Allergy     Bronchitis     Cancer     lymphoma    Candida esophagitis     Diffuse large B cell lymphoma     DLBCL (diffuse large B cell lymphoma) 11/5/2018    Ear infection     Encounter for blood transfusion     Fever blister     Hepatitis B     HIV (human immunodeficiency virus infection)     UGI bleed      Past Surgical History:   Procedure Laterality Date    BONE MARROW BIOPSY Right 4/25/2019    Procedure: Biopsy-bone marrow;  Surgeon: Sarahi Garcia MD;  Location: [address];  Service: Orthopedics;  Laterality: Right;    ESOPHAGOGASTRODUODENOSCOPY      INSERTION OF TUNNELED CENTRAL VENOUS CATHETER (CVC) WITH SUBCUTANEOUS PORT Left 9/13/2018    Procedure: [id number];  Surgeon: Luis Bogran-Reyes, MD;  Location: [facility];  Service: General;  Laterality: Left;    LYMPH NODE BIOPSY Left 9/13/2018    Procedure: BIOPSY, LYMPH NODE;  Surgeon: Luis Bogran-Reyes, MD;  Location: [facility];  Service: General;  Laterality: Left;  inguinal lymph node excisional biopsy    port a cath removal      PORTACATH PLACEMENT      UPPER GASTROINTESTINAL ENDOSCOPY       Family History    Problem Relation Age of Onset    No Known Problems Mother     No Known Problems Father     No Known Problems Sister     No Known Problems Brother     Lupus Sister     No Known Problems Sister     No Known Problems Brother     No Known Problems Brother      Social History     Socioeconomic History    Marital status: Single    Years of education: 3yrs colle   Tobacco Use    Smoking status: Former     Current packs/day: 0.00     Types: Cigarettes     Start date: 10/2/2013     Quit date: 10/2/2015     Years since quittin.1    Smokeless tobacco: Never   Substance and Sexual Activity    Alcohol use: Yes     Alcohol/week: 0.0 standard drinks of alcohol     Comment: rarely    Drug use: Yes     Frequency: 4.0 times per week     Types: Marijuana    Sexual activity: Yes     Partners: Male     Birth control/protection: Condom     Social Determinants of Health     Stress: No Stress Concern Present (1/15/2020)    [employer] - Occupational Stress Questionnaire     Feeling of Stress : Only a little     Review of Systems   Constitutional:  Negative for activity change, appetite change, chills, diaphoresis, fatigue and fever.   Eyes:  Negative for discharge.   Respiratory:  Negative for apnea.    Psychiatric/Behavioral:  Negative for agitation and behavioral problems.        Objective:      Physical Exam  Vitals and nursing note reviewed.   HENT:      Head: Normocephalic and atraumatic.   Eyes:      Pupils: Pupils are equal, round, and reactive to light.   Neck:      Thyroid: No thyromegaly.   Cardiovascular:      Rate and Rhythm: Normal rate and regular rhythm.   Pulmonary:      Effort: Pulmonary effort is normal. No respiratory distress.      Breath sounds: No wheezing.   Abdominal:      General: Bowel sounds are normal.      Palpations: Abdomen is soft.      Tenderness: There is no abdominal tenderness.   Musculoskeletal:      Cervical back: Normal range of motion and neck supple.   Skin:      General: Skin is warm.   Neurological:      Mental Status: He is oriented to person, place, and time.         Assessment:       1. AIDS        2. History of syphilis        3. Diffuse large B-cell lymphoma of lymph nodes of multiple regions            Plan:       Problem List Items Addressed This Visit       AIDS - Primary     The viral load remains undetectable and the CD4 count remains steady .  He has good virologic and immunologic control of HIV.   He is on Trimeuq           Diffuse large B cell lymphoma     Will follow  oncology          History of syphilis     Treated -in 2015   Will monitor -  The RPR is likely due to serofast state    10/30/2019 1/15/2020 5/6/2020 7/22/2020 11/4/2020 2/4/2021   RPR Quantitative 1:2 !  1:2 !  1:4 !  1:4 !  1:2 !  1:4 !       5/26/2022 1/13/2023 12/6/2023   RPR Quantitative 1:2 !  1:2 !  1:2 !         He might need LP to rule out Neurosyphillis -will discuss previous care with DR Kendra Cabrera     [facility] -He is MSM-will give Gardasil and check Acute hepatitis panel

## 2023-12-12 DIAGNOSIS — B20 AIDS: ICD-10-CM

## 2023-12-12 LAB
HAV IGM SERPL QL IA: NORMAL
HBV CORE IGM SERPL QL IA: NORMAL
HBV SURFACE AG SERPL QL IA: NORMAL
HCV AB SERPL QL IA: NORMAL

## 2023-12-12 RX ORDER — ABACAVIR SULFATE, DOLUTEGRAVIR SODIUM, LAMIVUDINE 600; 50; 300 MG/1; MG/1; MG/1
1 TABLET, FILM COATED ORAL DAILY
Qty: 30 TABLET | Refills: 5 | Status: SHIPPED | OUTPATIENT
Start: 2023-12-12 | End: 2024-06-09

## 2023-12-23 LAB — HIV GENTYP ISLT: NOT DETECTED

## 2024-01-12 LAB
B5701 INTERPRETATION: NORMAL
HLA-B27 RELATED AG QL: NEGATIVE
HLA-B27 RELATED AG QL: NORMAL

## 2024-01-25 ENCOUNTER — OFFICE VISIT (OUTPATIENT)
Dept: PRIMARY CARE CLINIC | Facility: CLINIC | Age: 34
End: 2024-01-25
Payer: MEDICARE

## 2024-01-25 DIAGNOSIS — R68.89 FLU-LIKE SYMPTOMS: Primary | ICD-10-CM

## 2024-01-25 DIAGNOSIS — B20 CURRENTLY ASYMPTOMATIC HIV INFECTION, WITH HISTORY OF HIV-RELATED ILLNESS: ICD-10-CM

## 2024-01-25 DIAGNOSIS — Z20.828 EXPOSURE TO INFLUENZA: ICD-10-CM

## 2024-01-25 DIAGNOSIS — C83.30 DIFFUSE LARGE B-CELL LYMPHOMA, UNSPECIFIED BODY REGION: ICD-10-CM

## 2024-01-25 PROCEDURE — 99213 OFFICE O/P EST LOW 20 MIN: CPT | Mod: 95,,, | Performed by: INTERNAL MEDICINE

## 2024-01-25 RX ORDER — AZELASTINE 1 MG/ML
1 SPRAY, METERED NASAL 2 TIMES DAILY
Qty: 30 ML | Refills: 0 | Status: SHIPPED | OUTPATIENT
Start: 2024-01-25

## 2024-01-25 RX ORDER — KETOROLAC TROMETHAMINE 10 MG/1
10 TABLET, FILM COATED ORAL EVERY 6 HOURS PRN
Qty: 20 TABLET | Refills: 0 | Status: SHIPPED | OUTPATIENT
Start: 2024-01-25 | End: 2024-01-30

## 2024-01-25 RX ORDER — PROMETHAZINE HYDROCHLORIDE AND DEXTROMETHORPHAN HYDROBROMIDE 6.25; 15 MG/5ML; MG/5ML
5 SYRUP ORAL EVERY 6 HOURS PRN
Qty: 118 ML | Refills: 0 | Status: SHIPPED | OUTPATIENT
Start: 2024-01-25

## 2024-01-25 RX ORDER — OSELTAMIVIR PHOSPHATE 75 MG/1
75 CAPSULE ORAL 2 TIMES DAILY
Qty: 10 CAPSULE | Refills: 0 | Status: SHIPPED | OUTPATIENT
Start: 2024-01-25 | End: 2024-01-30

## 2024-01-25 NOTE — LETTER
January 25, 2024      [facility]  [address]  [place] [address]  Phone: [phone]  Fax: [phone]       Patient: Alonzo Rajput   YOB: 1990  Date of Visit: 01/25/2024    To Whom It May Concern:    Nahed Rajput  was at [facility] on 01/25/2024. The patient may return to work/school on 01/29/2024 with no restrictions.     If you have any questions or concerns, or if I can be of further assistance, please do not hesitate to contact me.    Sincerely,    Jil Lawrence MA

## 2024-01-25 NOTE — PROGRESS NOTES
The patient location is: [place]  The chief complaint leading to consultation is: exposure to flu at school    Visit type: audiovisual    Face to Face time with patient:   15 minutes of total time spent on the encounter, which includes face to face time and non-face to face time preparing to see the patient (eg, review of tests), Obtaining and/or reviewing separately obtained history, Documenting clinical information in the electronic or other health record, Independently interpreting results (not separately reported) and communicating results to the patient/family/caregiver, or Care coordination (not separately reported).         Each patient to whom he or she provides medical services by telemedicine is:  (1) informed of the relationship between the physician and patient and the respective role of any other health care provider with respect to management of the patient; and (2) notified that he or she may decline to receive medical services by telemedicine and may withdraw from such care at any time.    Notes:     Subjective     Patient ID: Alonzo Rajput is a 33 y.o. male.    Chief Complaint: No chief complaint on file.  Flu symptoms    Sore Throat   This is a new problem. The current episode started yesterday. The problem has been gradually improving. The pain is worse on the right side. The maximum temperature recorded prior to his arrival was 101 - 101.9 F. The fever has been present for 1 to 2 days. The pain is at a severity of 7/10. The pain is moderate. Associated symptoms include congestion, coughing, diarrhea, ear pain, headaches, a hoarse voice, shortness of breath, trouble swallowing and vomiting. Pertinent negatives include no abdominal pain, drooling, ear discharge, plugged ear sensation, neck pain, stridor or swollen glands. He has had no exposure to strep or mono. He has tried NSAIDs and acetaminophen for the symptoms. The treatment provided mild relief.       Past Medical History:   Diagnosis  Date    Allergy     Bronchitis     Cancer     lymphoma    Candida esophagitis     Diffuse large B cell lymphoma     DLBCL (diffuse large B cell lymphoma) 2018    Ear infection     Encounter for blood transfusion     Fever blister     Hepatitis B     HIV (human immunodeficiency virus infection)     UGI bleed      Review of patient's allergies indicates:   Allergen Reactions    Amoxicillin Swelling and Anaphylaxis     Past Surgical History:   Procedure Laterality Date    BONE MARROW BIOPSY Right 2019    Procedure: Biopsy-bone marrow;  Surgeon: Sarahi Garcia MD;  Location: [address];  Service: Orthopedics;  Laterality: Right;    ESOPHAGOGASTRODUODENOSCOPY      INSERTION OF TUNNELED CENTRAL VENOUS CATHETER (CVC) WITH SUBCUTANEOUS PORT Left 2018    Procedure: [id number]-[id number];  Surgeon: Luis Bogran-Reyes, MD;  Location: [facility];  Service: General;  Laterality: Left;    LYMPH NODE BIOPSY Left 2018    Procedure: BIOPSY, LYMPH NODE;  Surgeon: Luis Bogran-Reyes, MD;  Location: [facility];  Service: General;  Laterality: Left;  inguinal lymph node excisional biopsy    port a cath removal      PORTACATH PLACEMENT      UPPER GASTROINTESTINAL ENDOSCOPY       Family History   Problem Relation Age of Onset    No Known Problems Mother     No Known Problems Father     No Known Problems Sister     No Known Problems Brother     Lupus Sister     No Known Problems Sister     No Known Problems Brother     No Known Problems Brother      Social History     Socioeconomic History    Marital status: Single    Years of education: 3yrs colle   Tobacco Use    Smoking status: Former     Current packs/day: 0.00     Types: Cigarettes     Start date: 10/2/2013     Quit date: 10/2/2015     Years since quittin.3    Smokeless tobacco: Never   Substance and Sexual Activity    Alcohol use: Yes     Alcohol/week: 0.0 standard drinks of alcohol     Comment: rarely    Drug use: Yes     Frequency: 4.0 times per week      Types: Marijuana    Sexual activity: Yes     Partners: Male     Birth control/protection: Condom     Social Determinants of Health     Financial Resource Strain: Medium Risk (1/25/2024)    Overall Financial Resource Strain (CARDIA)     Difficulty of Paying Living Expenses: Somewhat hard   Food Insecurity: Food Insecurity Present (1/25/2024)    Hunger Vital Sign     Worried About Running Out of Food in the Last Year: Often true     Ran Out of Food in the Last Year: Often true   Transportation Needs: Unmet Transportation Needs (1/25/2024)    PRAPARE - Transportation     Lack of Transportation (Medical): Yes     Lack of Transportation (Non-Medical): Yes   Physical Activity: Sufficiently Active (1/25/2024)    Exercise Vital Sign     Days of Exercise per Week: 5 days     Minutes of Exercise per Session: 60 min   Stress: Stress Concern Present (1/25/2024)    [employer] [place] [employer] - Occupational Stress Questionnaire     Feeling of Stress : Very much   Social Connections: Unknown (1/25/2024)    Social Connection and Isolation Panel [NHANES]     Frequency of Communication with Friends and Family: Once a week     Frequency of Social Gatherings with Friends and Family: Patient declined     Active Member of Clubs or Organizations: Patient declined     Marital Status: Never    Housing Stability: High Risk (1/25/2024)    Housing Stability Vital Sign     Unable to Pay for Housing in the Last Year: Yes     Number of Places Lived in the Last Year: 1     Unstable Housing in the Last Year: No         There were no vitals taken for this visit.  Outpatient Medications as of 1/25/2024   Medication Sig Dispense Refill    abacavir-dolutegravir-lamivud (TRIUMEQ) 600- mg Tab Take 1 tablet by mouth once daily. 30 tablet 5    azelastine (ASTELIN) 137 mcg (0.1 %) nasal spray 1 spray (137 mcg total) by Nasal route 2 (two) times daily. 30 mL 0    cetirizine (ZYRTEC) 10 MG tablet Take 1 tablet (10 mg total) by mouth  once daily. (Patient not taking: Reported on 12/11/2023) 30 tablet 4    diphenhydrAMINE (BENADRYL) 25 mg capsule Take 1 each (25 mg total) by mouth every 6 (six) hours as needed for Allergies (sinus congestion). (Patient not taking: Reported on 12/11/2023)  0    lisinopriL (ZESTRIL) 2.5 MG tablet Take 1 tablet (2.5 mg total) by mouth once daily. May be taken hs or in am (Patient not taking: Reported on 12/11/2023) 90 tablet 1    omeprazole (PRILOSEC) 20 MG capsule Take 1 capsule (20 mg total) by mouth daily as needed (heartburn). (Patient not taking: Reported on 12/11/2023) 30 capsule 3     No current facility-administered medications on file as of 1/25/2024.       Review of Systems   HENT:  Positive for nasal congestion, ear pain, hoarse voice, sore throat and trouble swallowing. Negative for drooling and ear discharge.    Respiratory:  Positive for cough and shortness of breath. Negative for stridor.    Gastrointestinal:  Positive for diarrhea and vomiting. Negative for abdominal pain.   Musculoskeletal:  Negative for neck pain.   Neurological:  Positive for headaches.   All other systems reviewed and are negative.         Objective     Physical Exam  Constitutional:       Appearance: Normal appearance.   HENT:      Head: Normocephalic and atraumatic.   Pulmonary:      Effort: No respiratory distress.   Neurological:      Mental Status: He is alert and oriented to person, place, and time.   Psychiatric:         Mood and Affect: Mood normal.         Behavior: Behavior normal.            Assessment and Plan     1. Flu-like symptoms  -     oseltamivir (TAMIFLU) 75 MG capsule; Take 1 capsule (75 mg total) by mouth 2 (two) times daily. for 5 days  Dispense: 10 capsule; Refill: 0  -     ketorolac (TORADOL) 10 mg tablet; Take 1 tablet (10 mg total) by mouth every 6 (six) hours as needed for Pain.  Dispense: 20 tablet; Refill: 0  -     azelastine (ASTELIN) 137 mcg (0.1 %) nasal spray; 1 spray (137 mcg total) by Nasal  route 2 (two) times daily.  Dispense: 30 mL; Refill: 0  -     promethazine-dextromethorphan (PROMETHAZINE-DM) 6.25-15 mg/5 mL Syrp; Take 5 mLs by mouth every 6 (six) hours as needed (cough).  Dispense: 118 mL; Refill: 0    2. Exposure to influenza  -     oseltamivir (TAMIFLU) 75 MG capsule; Take 1 capsule (75 mg total) by mouth 2 (two) times daily. for 5 days  Dispense: 10 capsule; Refill: 0  -     ketorolac (TORADOL) 10 mg tablet; Take 1 tablet (10 mg total) by mouth every 6 (six) hours as needed for Pain.  Dispense: 20 tablet; Refill: 0  -     azelastine (ASTELIN) 137 mcg (0.1 %) nasal spray; 1 spray (137 mcg total) by Nasal route 2 (two) times daily.  Dispense: 30 mL; Refill: 0  -     promethazine-dextromethorphan (PROMETHAZINE-DM) 6.25-15 mg/5 mL Syrp; Take 5 mLs by mouth every 6 (six) hours as needed (cough).  Dispense: 118 mL; Refill: 0    3. Diffuse large B-cell lymphoma, unspecified body region    4. Currently asymptomatic HIV infection, with history of HIV-related illness  Comments:  last viral load undetectable         If no improvement, will need to be examined in person.  Po intake is sufficient from report.    Immunization History   Administered Date(s) Administered    COVID-19, MRNA, LN-S, PF (Pfizer) (Purple Cap) 08/23/2021, 09/16/2021    DTaP 1989, 03/19/1990, 02/05/1992, 08/19/1992, 06/01/1995    HIB 02/05/1992    HPV 9-Valent 12/11/2023    Hepatitis B, Pediatric/Adolescent 06/01/1995, 06/29/1995, 07/01/2008    IPV 1989, 03/19/1990, 02/05/1992, 06/01/1995    Influenza 11/22/2005, 11/08/2006    Influenza - Quadrivalent - PF *Preferred* (6 months and older) 11/22/2005, 11/08/2006, 10/02/2015, 09/30/2016, 11/20/2017, 10/30/2019, 11/04/2020, 10/27/2021, 01/23/2023    MMR 02/05/1992, 06/01/1995    Meningococcal Conjugate (MCV4P) 11/08/2006    PPD Test 10/02/2015, 04/07/2016    Pneumococcal Conjugate - 13 Valent 10/02/2015    Pneumococcal Polysaccharide - 23 Valent 03/31/2016, 06/02/2021    Td  - PF (ADULT) 09/30/2016    Tdap 11/08/2006

## 2024-01-30 ENCOUNTER — PATIENT MESSAGE (OUTPATIENT)
Dept: PRIMARY CARE CLINIC | Facility: CLINIC | Age: 34
End: 2024-01-30
Payer: MEDICARE

## 2024-03-01 ENCOUNTER — LAB VISIT (OUTPATIENT)
Dept: LAB | Facility: HOSPITAL | Age: 34
End: 2024-03-01
Attending: INTERNAL MEDICINE
Payer: MEDICARE

## 2024-03-01 ENCOUNTER — TELEPHONE (OUTPATIENT)
Dept: INFECTIOUS DISEASES | Facility: CLINIC | Age: 34
End: 2024-03-01
Payer: MEDICARE

## 2024-03-01 DIAGNOSIS — C85.90 NON-HODGKIN'S LYMPHOMA IN RELAPSE: ICD-10-CM

## 2024-03-01 LAB — LDH SERPL L TO P-CCNC: 185 U/L (ref 110–260)

## 2024-03-01 PROCEDURE — 36415 COLL VENOUS BLD VENIPUNCTURE: CPT | Performed by: INTERNAL MEDICINE

## 2024-03-01 PROCEDURE — 83615 LACTATE (LD) (LDH) ENZYME: CPT | Performed by: INTERNAL MEDICINE

## 2024-03-04 ENCOUNTER — OFFICE VISIT (OUTPATIENT)
Dept: INFECTIOUS DISEASES | Facility: CLINIC | Age: 34
End: 2024-03-04
Payer: MEDICARE

## 2024-03-04 ENCOUNTER — LAB VISIT (OUTPATIENT)
Dept: LAB | Facility: HOSPITAL | Age: 34
End: 2024-03-04
Attending: INTERNAL MEDICINE
Payer: MEDICARE

## 2024-03-04 VITALS
HEIGHT: 69 IN | HEART RATE: 60 BPM | SYSTOLIC BLOOD PRESSURE: 122 MMHG | WEIGHT: 183.19 LBS | BODY MASS INDEX: 27.13 KG/M2 | DIASTOLIC BLOOD PRESSURE: 86 MMHG

## 2024-03-04 DIAGNOSIS — B20 CURRENTLY ASYMPTOMATIC HIV INFECTION, WITH HISTORY OF HIV-RELATED ILLNESS: ICD-10-CM

## 2024-03-04 DIAGNOSIS — C83.38 DIFFUSE LARGE B-CELL LYMPHOMA OF LYMPH NODES OF MULTIPLE REGIONS: ICD-10-CM

## 2024-03-04 DIAGNOSIS — Z86.19 HISTORY OF SYPHILIS: ICD-10-CM

## 2024-03-04 DIAGNOSIS — Z86.19 HISTORY OF HEPATITIS B: ICD-10-CM

## 2024-03-04 DIAGNOSIS — Z86.19 HISTORY OF HEPATITIS B: Primary | ICD-10-CM

## 2024-03-04 LAB
HBV CORE AB SERPL QL IA: NORMAL
HBV SURFACE AB SER-ACNC: 15.64 MIU/ML
HBV SURFACE AB SER-ACNC: REACTIVE M[IU]/ML

## 2024-03-04 PROCEDURE — 86706 HEP B SURFACE ANTIBODY: CPT | Performed by: INTERNAL MEDICINE

## 2024-03-04 PROCEDURE — 99213 OFFICE O/P EST LOW 20 MIN: CPT | Mod: PBBFAC | Performed by: INTERNAL MEDICINE

## 2024-03-04 PROCEDURE — 86704 HEP B CORE ANTIBODY TOTAL: CPT | Performed by: INTERNAL MEDICINE

## 2024-03-04 PROCEDURE — 99999 PR PBB SHADOW E&M-EST. PATIENT-LVL III: CPT | Mod: PBBFAC,,, | Performed by: INTERNAL MEDICINE

## 2024-03-04 PROCEDURE — 99214 OFFICE O/P EST MOD 30 MIN: CPT | Mod: S$PBB,,, | Performed by: INTERNAL MEDICINE

## 2024-03-04 PROCEDURE — 36415 COLL VENOUS BLD VENIPUNCTURE: CPT | Performed by: INTERNAL MEDICINE

## 2024-03-04 NOTE — PROGRESS NOTES
HIV Follow-up Visit  Alonzo Rajput is here for follow-up of HIV infection. He is feeling better since his last visit.     Patient is compliant with HIV medications.  He is on Trimeuq   HIV viral load- 12/23- undetected   Patient does not have side effects with the HIV medications.none    The following portions of the patient's history were reviewed and updated as appropriate: allergies, current medications, past family history, past medical history, past social history, past surgical history, and problem list.    Review of Systems   Constitutional:  Negative for chills, fever and weight loss.   Skin:  Negative for itching and rash.   Neurological:  Negative for dizziness and headaches.          Physical Exam  Vitals and nursing note reviewed.   HENT:      Head: Normocephalic and atraumatic.   Eyes:      Pupils: Pupils are equal, round, and reactive to light.   Neck:      Thyroid: No thyromegaly.   Cardiovascular:      Rate and Rhythm: Normal rate and regular rhythm.   Pulmonary:      Effort: Pulmonary effort is normal. No respiratory distress.      Breath sounds: No wheezing.   Abdominal:      General: Bowel sounds are normal.      Palpations: Abdomen is soft.      Tenderness: There is no abdominal tenderness.   Musculoskeletal:      Cervical back: Normal range of motion and neck supple.          Immunization History   Administered Date(s) Administered    COVID-19, MRNA, LN-S, PF (Pfizer) (Purple Cap) 08/23/2021, 09/16/2021    DTaP 1989, 03/19/1990, 02/05/1992, 08/19/1992, 06/01/1995    HIB 02/05/1992    HPV 9-Valent 12/11/2023    Hepatitis B, Pediatric/Adolescent 06/01/1995, 06/29/1995, 07/01/2008    IPV 1989, 03/19/1990, 02/05/1992, 06/01/1995    Influenza 11/22/2005, 11/08/2006    Influenza - Quadrivalent - PF *Preferred* (6 months and older) 11/22/2005, 11/08/2006, 10/02/2015, 09/30/2016, 11/20/2017, 10/30/2019, 11/04/2020, 10/27/2021, 01/23/2023    MMR 02/05/1992, 06/01/1995     Meningococcal Conjugate (MCV4P) 11/08/2006    PPD Test 10/02/2015, 04/07/2016    Pneumococcal Conjugate - 13 Valent 10/02/2015    Pneumococcal Polysaccharide - 23 Valent 03/31/2016, 06/02/2021    Td - PF (ADULT) 09/30/2016    Tdap 11/08/2006             Assessment:  Patient Active Problem List   Diagnosis    History of hepatitis B    Diffuse large B cell lymphoma    History of syphilis    Chronic otitis externa of right ear    Insomnia    Proteinuria    Mild intermittent asthma without complication    Seasonal allergic rhinitis    Diffuse large B-cell lymphoma    Currently asymptomatic HIV infection, with history of HIV-related illness    DLBCL (diffuse large B cell lymphoma)    Dermatitis    BMI 26.0-26.9,adult    History of lymphoma    Bone infarct of distal femur, right     Plan:    Problem List Items Addressed This Visit       History of hepatitis B - Primary     Component      Latest Ref Rng 11/4/2020 2/4/2021 6/2/2021   Hep B Viral DNA IU/ML      <10 IU/mL <10  <10  <10      Will need to clarify the documentation of previous Hep B  Will send Hep B surface  antibody            Relevant Orders    Hepatitis B Surface Ab, Qualitative    Hepatitis B Core Antibody, Total    Diffuse large B cell lymphoma     Will follow oncology          History of syphilis     He is likely in a serofast state .   3/14/2019 6/3/2019 10/30/2019 1/15/2020 5/6/2020 7/22/2020   RPR Quantitative 1:2 !  1:4 !  1:2 !  1:2 !  1:4 !  1:4 !       11/4/2020 2/4/2021 5/26/2022 1/13/2023 12/6/2023   RPR Quantitative 1:2 !  1:4 !  1:2 !  1:2 !  1:2 !       Legend:  ! Abnormal    Will need close follow up -  He might need LP -will follow up.           Currently asymptomatic HIV infection, with history of HIV-related illness     The viral load remains undetectable and the CD4 count remains steady .  He has good virologic and immunologic control of HIV.   Continue Trimeuq               Problem List Items Addressed This Visit       History of  hepatitis B - Primary    Relevant Orders    Hepatitis B Surface Ab, Qualitative    Hepatitis B Core Antibody, Total      The patient has been counseled regarding the importance of compliance with every dose of HIV medications. Possible side effects have been reviewed and the patients questions have been answered.

## 2024-03-04 NOTE — ASSESSMENT & PLAN NOTE
The viral load remains undetectable and the CD4 count remains steady .  He has good virologic and immunologic control of HIV.   Continue Trimeuq

## 2024-03-04 NOTE — ASSESSMENT & PLAN NOTE
He is likely in a serofast state .   3/14/2019 6/3/2019 10/30/2019 1/15/2020 5/6/2020 7/22/2020   RPR Quantitative 1:2 !  1:4 !  1:2 !  1:2 !  1:4 !  1:4 !       11/4/2020 2/4/2021 5/26/2022 1/13/2023 12/6/2023   RPR Quantitative 1:2 !  1:4 !  1:2 !  1:2 !  1:2 !       Legend:  ! Abnormal    Will need close follow up -  He might need LP -will follow up.

## 2024-03-04 NOTE — ASSESSMENT & PLAN NOTE
Component      Latest Ref Rng 11/4/2020 2/4/2021 6/2/2021   Hep B Viral DNA IU/ML      <10 IU/mL <10  <10  <10      Will need to clarify the documentation of previous Hep B  Will send Hep B surface  antibody

## 2024-05-21 ENCOUNTER — TELEPHONE (OUTPATIENT)
Dept: INFECTIOUS DISEASES | Facility: CLINIC | Age: 34
End: 2024-05-21
Payer: MEDICARE

## 2024-05-21 NOTE — TELEPHONE ENCOUNTER
Informed pt that due to book out on 7/23/24 with [facility], r/s needed. Appt r/s. Pt verbalized understanding and agreed to date, time, and location of new appt.

## 2024-06-05 ENCOUNTER — HOSPITAL ENCOUNTER (EMERGENCY)
Facility: HOSPITAL | Age: 34
Discharge: HOME | End: 2024-06-05
Attending: EMERGENCY MEDICINE
Payer: MEDICARE

## 2024-06-05 VITALS
DIASTOLIC BLOOD PRESSURE: 85 MMHG | TEMPERATURE: 99 F | BODY MASS INDEX: 27.11 KG/M2 | HEIGHT: 69 IN | HEART RATE: 72 BPM | OXYGEN SATURATION: 98 % | WEIGHT: 183 LBS | RESPIRATION RATE: 18 BRPM | SYSTOLIC BLOOD PRESSURE: 134 MMHG

## 2024-06-05 DIAGNOSIS — T14.90XA TRAUMA: ICD-10-CM

## 2024-06-05 DIAGNOSIS — S82.832A OTHER CLOSED FRACTURE OF DISTAL END OF LEFT FIBULA, INITIAL ENCOUNTER: Primary | ICD-10-CM

## 2024-06-05 PROCEDURE — 29515 APPLICATION SHORT LEG SPLINT: CPT | Mod: LT

## 2024-06-05 PROCEDURE — 99283 EMERGENCY DEPT VISIT LOW MDM: CPT | Mod: 25

## 2024-06-05 RX ORDER — HYDROCODONE BITARTRATE AND ACETAMINOPHEN 5; 325 MG/1; MG/1
1 TABLET ORAL EVERY 6 HOURS PRN
Qty: 12 TABLET | Refills: 0 | Status: SHIPPED | OUTPATIENT
Start: 2024-06-05

## 2024-06-05 NOTE — Clinical Note
"Alonzo Palacios"Regulo was seen and treated in our emergency department on 6/5/2024.  He may return to work on 06/10/2024.       If you have any questions or concerns, please don't hesitate to call.      Ashu Flores NP"

## 2024-06-05 NOTE — ED PROVIDER NOTES
Encounter Date: 6/5/2024       History     Chief Complaint   Patient presents with    Ankle Pain     Pt sprang L ankle playing basketball Saturday. Pt was in [place] and wanted to wait to be seen after getting back. Pain 5/10     33 year old male present to the emergency department with a complaint of left ankle pain. Reports he twisted it while playing basketball on Saturday. The pain has progressively gotten worse since. The pain is described as dull and aching. The pain is continuous and worsened by ambulation. Reports not being able to ambulate since the injury occurred. Associated symptoms are swelling. Denies numbness or tingling.         Review of patient's allergies indicates:   Allergen Reactions    Amoxicillin Swelling and Anaphylaxis     Past Medical History:   Diagnosis Date    Allergy     Bronchitis     Cancer     lymphoma    Candida esophagitis     Diffuse large B cell lymphoma     DLBCL (diffuse large B cell lymphoma) 11/5/2018    Ear infection     Encounter for blood transfusion     Fever blister     Hepatitis B     HIV (human immunodeficiency virus infection)     UGI bleed      Past Surgical History:   Procedure Laterality Date    BONE MARROW BIOPSY Right 4/25/2019    Procedure: Biopsy-bone marrow;  Surgeon: Sarahi Garcia MD;  Location: [address];  Service: Orthopedics;  Laterality: Right;    ESOPHAGOGASTRODUODENOSCOPY      INSERTION OF TUNNELED CENTRAL VENOUS CATHETER (CVC) WITH SUBCUTANEOUS PORT Left 9/13/2018    Procedure: [id number];  Surgeon: Luis Bogran-Reyes, MD;  Location: [facility];  Service: General;  Laterality: Left;    LYMPH NODE BIOPSY Left 9/13/2018    Procedure: BIOPSY, LYMPH NODE;  Surgeon: Luis Bogran-Reyes, MD;  Location: [facility];  Service: General;  Laterality: Left;  inguinal lymph node excisional biopsy    port a cath removal      PORTACATH PLACEMENT      UPPER GASTROINTESTINAL ENDOSCOPY       Family History   Problem Relation Name Age of Onset    No Known  Problems Mother      No Known Problems Father      No Known Problems Sister      No Known Problems Brother      Lupus Sister      No Known Problems Sister      No Known Problems Brother      No Known Problems Brother       Social History     Tobacco Use    Smoking status: Former     Current packs/day: 0.00     Types: Cigarettes     Start date: 10/2/2013     Quit date: 10/2/2015     Years since quittin.6    Smokeless tobacco: Never   Substance Use Topics    Alcohol use: Yes     Alcohol/week: 0.0 standard drinks of alcohol     Comment: rarely    Drug use: Yes     Frequency: 4.0 times per week     Types: Marijuana     Review of Systems   Constitutional:  Negative for fever.   HENT:  Negative for sore throat.    Respiratory:  Negative for shortness of breath.    Cardiovascular:  Negative for chest pain.   Gastrointestinal:  Negative for nausea.   Genitourinary:  Negative for dysuria.   Musculoskeletal:  Positive for joint swelling (left ankle). Negative for back pain.   Skin:  Negative for rash.   Neurological:  Negative for weakness.   Hematological:  Does not bruise/bleed easily.       Physical Exam     Initial Vitals [24 1545]   BP Pulse Resp Temp SpO2   134/85 72 18 98.6 °F (37 °C) 98 %      MAP       --         Physical Exam    Constitutional: He appears well-developed and well-nourished.   HENT:   Head: Normocephalic and atraumatic.   Eyes: Conjunctivae are normal. Pupils are equal, round, and reactive to light.   Neck: Neck supple.   Normal range of motion.  Cardiovascular:  Normal rate, regular rhythm, normal heart sounds and intact distal pulses.           Pulmonary/Chest: Breath sounds normal.   Abdominal: Abdomen is soft. There is no rebound and no guarding.   Musculoskeletal:      Cervical back: Normal range of motion and neck supple.      Left ankle: Swelling present. Tenderness present. Decreased range of motion.      Left foot: Swelling present.     Neurological: He is alert.   Skin: Skin is  warm and dry.   Psychiatric: He has a normal mood and affect. His behavior is normal. Thought content normal.         ED Course   Procedures  Labs Reviewed - No data to display       Imaging Results              X-Ray Ankle Complete Left (Final result)  Result time 06/05/24 16:35:23      Final result by Noé Kaplan MD (06/05/24 16:35:23)                   Impression:      As above      Electronically signed by: Noé Kaplan  Date:    06/05/2024  Time:    16:35               Narrative:    EXAMINATION:  XR ANKLE COMPLETE 3 VIEW LEFT    CLINICAL HISTORY:  XR ANKLE COMPLETE 3 VIEW LEFTInjury, unspecified, initial encounter    COMPARISON:  None    FINDINGS:  Multiple radiographic views  were obtained.    Posterior Achilles insertional tendinopathy.  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  Irregular a cortical is appearance of the lateral malleolus with soft tissue swelling.  As correlate clinically for age-indeterminate fracture.  Anterior talar cortical irregularity consistent with degenerative joint disease.                                    Imaging Results              X-Ray Ankle Complete Left (Final result)  Result time 06/05/24 16:35:23      Final result by Noé Kaplan MD (06/05/24 16:35:23)                   Impression:      As above      Electronically signed by: Noé Kaplan  Date:    06/05/2024  Time:    16:35               Narrative:    EXAMINATION:  XR ANKLE COMPLETE 3 VIEW LEFT    CLINICAL HISTORY:  XR ANKLE COMPLETE 3 VIEW LEFTInjury, unspecified, initial encounter    COMPARISON:  None    FINDINGS:  Multiple radiographic views  were obtained.    Posterior Achilles insertional tendinopathy.  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  Irregular a cortical is appearance of the lateral malleolus with soft tissue swelling.  As correlate clinically for age-indeterminate fracture.  Anterior talar cortical irregularity consistent with degenerative joint disease.                                          Medications - No data to display  Medical Decision Making  Left ankle pain after injury while playing basketball. There is a moderate amount of swelling to the lateral malleolus and proximal foot. Patients extremity is neurologically in tact. Xray shows distal fibular fracture. Patient will be placed in posterior short leg splint. Prescription of norco given. Referral sent to orthopedics.      Amount and/or Complexity of Data Reviewed  Radiology: ordered.    Risk  Prescription drug management.                                      Clinical Impression:  Final diagnoses:  [T14.90XA] Trauma  [S82.832A] Other closed fracture of distal end of left fibula, initial encounter (Primary)          ED Disposition Condition    Discharge Stable          ED Prescriptions       Medication Sig Dispense Start Date End Date Auth. Provider    HYDROcodone-acetaminophen (NORCO) 5-325 mg per tablet Take 1 tablet by mouth every 6 (six) hours as needed for Pain. 12 tablet 6/5/2024 -- Ashu Flores NP          Follow-up Information       Follow up With Specialties Details Why Contact Info    [facility], [facility]  Schedule an appointment as soon as possible for a visit in 2 days  [address] [place] [address] [address]  [place] [id number]  [phone]               Ashu Flores NP  06/05/24 [id number]

## 2024-06-06 DIAGNOSIS — B20 AIDS: ICD-10-CM

## 2024-06-07 ENCOUNTER — HOSPITAL ENCOUNTER (OUTPATIENT)
Dept: RADIOLOGY | Facility: HOSPITAL | Age: 34
Discharge: HOME | End: 2024-06-07
Attending: STUDENT IN AN ORGANIZED HEALTH CARE EDUCATION/TRAINING PROGRAM
Payer: MEDICARE

## 2024-06-07 ENCOUNTER — OFFICE VISIT (OUTPATIENT)
Dept: SPORTS MEDICINE | Facility: CLINIC | Age: 34
End: 2024-06-07
Payer: MEDICARE

## 2024-06-07 VITALS — RESPIRATION RATE: 17 BRPM | HEIGHT: 69 IN | WEIGHT: 183 LBS | BODY MASS INDEX: 27.11 KG/M2

## 2024-06-07 DIAGNOSIS — M25.572 LEFT ANKLE PAIN, UNSPECIFIED CHRONICITY: ICD-10-CM

## 2024-06-07 DIAGNOSIS — S82.832A OTHER CLOSED FRACTURE OF DISTAL END OF LEFT FIBULA, INITIAL ENCOUNTER: Primary | ICD-10-CM

## 2024-06-07 DIAGNOSIS — S93.492A SPRAIN OF OTHER LIGAMENT OF LEFT ANKLE, INITIAL ENCOUNTER: ICD-10-CM

## 2024-06-07 DIAGNOSIS — S82.832A OTHER CLOSED FRACTURE OF DISTAL END OF LEFT FIBULA, INITIAL ENCOUNTER: ICD-10-CM

## 2024-06-07 PROCEDURE — 73700 CT LOWER EXTREMITY W/O DYE: CPT | Mod: TC,LT

## 2024-06-07 PROCEDURE — 73700 CT LOWER EXTREMITY W/O DYE: CPT | Mod: 26,LT,, | Performed by: RADIOLOGY

## 2024-06-07 PROCEDURE — 73600 X-RAY EXAM OF ANKLE: CPT | Mod: 26,LT,, | Performed by: RADIOLOGY

## 2024-06-07 PROCEDURE — 97760 ORTHOTIC MGMT&TRAING 1ST ENC: CPT | Mod: PBBFAC,PN | Performed by: STUDENT IN AN ORGANIZED HEALTH CARE EDUCATION/TRAINING PROGRAM

## 2024-06-07 PROCEDURE — 99999 PR PBB SHADOW E&M-EST. PATIENT-LVL IV: CPT | Mod: PBBFAC,,, | Performed by: STUDENT IN AN ORGANIZED HEALTH CARE EDUCATION/TRAINING PROGRAM

## 2024-06-07 PROCEDURE — 99204 OFFICE O/P NEW MOD 45 MIN: CPT | Mod: S$PBB,,, | Performed by: STUDENT IN AN ORGANIZED HEALTH CARE EDUCATION/TRAINING PROGRAM

## 2024-06-07 PROCEDURE — 99214 OFFICE O/P EST MOD 30 MIN: CPT | Mod: PBBFAC,25,PN | Performed by: STUDENT IN AN ORGANIZED HEALTH CARE EDUCATION/TRAINING PROGRAM

## 2024-06-07 PROCEDURE — 73600 X-RAY EXAM OF ANKLE: CPT | Mod: TC,PN,LT

## 2024-06-07 NOTE — PROGRESS NOTES
Orthopaedics Sports Medicine     Ankle Initial Visit         6/7/2024    Referring MD: Stef Hernandez MD    Chief Complaint   Patient presents with    Left Ankle - Pain         History of Present Illness:   Alonzo Rajput is a 33 y.o. male who presents with left ankle pain and dysfunction. The patient is here today on referral after being seen in the ER on 6/5/24 at which time he noted a complaint of left ankle pain. Reported he twisted it while playing basketball on Saturday (6/1). The pain had progressively gotten worse since. The pain was described as dull and aching. The pain was continuous and worsened by ambulation. Reported not being able to ambulate since the injury occurred. Associated symptoms were swelling. Denied numbness or tingling. Was believed to have a lateral malleolus fracture based on X-ray and was placed in posterior short leg splint and referred to orthopedics.     Current symptoms include left ankle pain and swelling. Patient reports difficulty with ambulation and inability to bear weight since the injury.      Pain is aggravated by weight bearing.       Evaluation to date: XR     Treatment to date: Rest, activity modification, short leg splint, Norco     Past Medical History:   Past Medical History:   Diagnosis Date    Allergy     Bronchitis     Cancer     lymphoma    Candida esophagitis     Diffuse large B cell lymphoma     DLBCL (diffuse large B cell lymphoma) 11/5/2018    Ear infection     Encounter for blood transfusion     Fever blister     Hepatitis B     HIV (human immunodeficiency virus infection)     UGI bleed        Past Surgical History:   Past Surgical History:   Procedure Laterality Date    BONE MARROW BIOPSY Right 4/25/2019    Procedure: Biopsy-bone marrow;  Surgeon: Sarahi Garcia MD;  Location: [facility] OR [address] [place] [address];  Service: Orthopedics;  Laterality: Right;    ESOPHAGOGASTRODUODENOSCOPY      INSERTION OF TUNNELED CENTRAL VENOUS CATHETER (CVC) WITH SUBCUTANEOUS PORT  Left 9/13/2018    Procedure: [id number];  Surgeon: Luis Bogran-Reyes, MD;  Location: [facility] OR;  Service: General;  Laterality: Left;    LYMPH NODE BIOPSY Left 9/13/2018    Procedure: BIOPSY, LYMPH NODE;  Surgeon: Luis Bogran-Reyes, MD;  Location: [facility] OR;  Service: General;  Laterality: Left;  inguinal lymph node excisional biopsy    port a cath removal      PORTACATH PLACEMENT      UPPER GASTROINTESTINAL ENDOSCOPY         Medications:  Patient's Medications   New Prescriptions    No medications on file   Previous Medications    ABACAVIR-DOLUTEGRAVIR-LAMIVUD (TRIUMEQ) 600- MG TAB    Take 1 tablet by mouth once daily.    AZELASTINE (ASTELIN) 137 MCG (0.1 %) NASAL SPRAY    1 spray (137 mcg total) by Nasal route 2 (two) times daily.    CETIRIZINE (ZYRTEC) 10 MG TABLET    Take 1 tablet (10 mg total) by mouth once daily.    DIPHENHYDRAMINE (BENADRYL) 25 MG CAPSULE    Take 1 each (25 mg total) by mouth every 6 (six) hours as needed for Allergies (sinus congestion).    HYDROCODONE-ACETAMINOPHEN (NORCO) 5-325 MG PER TABLET    Take 1 tablet by mouth every 6 (six) hours as needed for Pain.    LISINOPRIL (ZESTRIL) 2.5 MG TABLET    Take 1 tablet (2.5 mg total) by mouth once daily. May be taken hs or in am    OMEPRAZOLE (PRILOSEC) 20 MG CAPSULE    Take 1 capsule (20 mg total) by mouth daily as needed (heartburn).    PROMETHAZINE-DEXTROMETHORPHAN (PROMETHAZINE-DM) 6.25-15 MG/5 ML SYRP    Take 5 mLs by mouth every 6 (six) hours as needed (cough).   Modified Medications    No medications on file   Discontinued Medications    No medications on file       Allergies:   Review of patient's allergies indicates:   Allergen Reactions    Amoxicillin Swelling and Anaphylaxis       Social History:   Home town: [place], [place]  Occupation: teacher at [employer]  Alcohol use: He reports current alcohol use.  Tobacco use: He reports that he quit smoking about 8 years ago. His smoking use included cigarettes. He started  "smoking about 10 years ago. He has never used smokeless tobacco.    Review of systems:  History of recent illness, fevers, shakes, or chills: no  History of cardiac problems or chest pain: no  History of pulmonary problems or asthma: no  History of diabetes: no  History of prior dvt or clotting problems: no  History of sleep apnea: no      Physical Examination:  Estimated body mass index is 27.02 kg/m² as calculated from the following:    Height as of this encounter: 5' 9" (1.753 m).    Weight as of this encounter: 83 kg (182 lb 15.7 oz).    General  Healthy appearing male in no acute distress  Alert and oriented, normal mood, appropriate affect    Ortho Exam    Left Ankle:    Inspection:   Swelling present to lateral ankle     Palpation tenderness: Lateral malleolus    Range of motion:  Fluid, limited due to pain and swelling    Strength:        Deferred     Stability:  Deferred     Neurovascular exam  - motor function grossly intact bilaterally to hip flexion, knee extension and flexion, ankle dorsiflexion and plantarflexion  - sensation intact to light touch bilaterally to femoral, tibial, tibial and peroneal distributions  - normal pedal pulses, warm and well perfused with capillary refill < 2 sec   - Calf soft and compressible      Imaging:  X-Ray Ankle 2 View Left  Narrative: EXAM: XR ANKLE 2 VIEW LEFT    CLINICAL HISTORY: Left ankle pain    TECHNIQUE: Left ankle, 2 views with stress    COMPARISON:  06/05/2024    FINDINGS: Periarticular soft tissue swelling.  AP Alignment is satisfactory.  Impression:  As above.    Finalized on: 6/7/2024 11:03 AM By:  JUNIOR Kenney MD, MD  [facility]# [id number]      2024-06-07 11:05:20.011    [facility]       Physician Read: I agree with the above impression. There is atypical appearance of distal fibula that could be acute injury or chronic deformity      Impression:  33 y.o. male with left ankle distal fibula fracture      Plan:  Discussed diagnosis and treatment options with the patient " today. Imaging and physical exam findings are consistent with distal fibula fracture. However, there is atypical appearance on XR.  I recommend proceeding with STAT CT scan left ankle for further evaluation. We will also transition him to CAM boot at this time.   Under the direction of Tigre Burgos MD, 15 minutes were spent sizing, fitting, and educating for durable medical equipment application today.  CPT 41966.  If he has acute fracture then I will have him discuss with Dr. Dveine next week.            Tigre Burgos MD    I, Robin Kathi, acted as a scribe for Tigre Burgos MD for the duration of this office visit.      ADDENDUM:  CT Ankle (Including Hindfoot) Without Contrast Left  Narrative: EXAMINATION:  CT ANKLE (INCLUDING HINDFOOT) WITHOUT CONTRAST LEFT    CLINICAL HISTORY:  Fracture, ankle;  Other fracture of upper and lower end of left fibula, initial encounter for closed fracture    TECHNIQUE:  Noncontrast axial CT of the left ankle was performed with multiplanar sagittal and coronal reformations.    COMPARISON:  None.    FINDINGS:  There is prominent spurring at the anterolateral aspect of the distal tibia and anterior aspect of the adjacent distal fibula with pseudoarticulation, possibly chronic sequelae of prior ligamentous injury.  Small dystrophic soft tissue calcifications noted inferior to the distal fibula.    Minimal spurring present at the distal end of the medial malleolus, talar calcaneal articulation, and also at the medial, lateral, and dorsal margins of the talus.  Talar dome is intact and ankle mortise is symmetric.  No evidence of osteochondral lesion.  No acute fracture or dislocation.    There is mild diffuse soft tissue swelling and subcutaneous infiltration about the ankle and hindfoot.  No large joint effusion or fluid collection evident.  No soft tissue mass.  Impression: 1. Suspected chronic posttraumatic sequelae at the distal aspect of the  tibia and fibula as detailed.  2. Mild degenerative change of the ankle and hindfoot.    Electronically signed by: PATTI Perez MD  Date:    06/07/2024  Time:    13:45  X-Ray Ankle 2 View Left  Narrative: EXAM: XR ANKLE 2 VIEW LEFT    CLINICAL HISTORY: Left ankle pain    TECHNIQUE: Left ankle, 2 views with stress    COMPARISON:  06/05/2024    FINDINGS: Periarticular soft tissue swelling.  AP Alignment is satisfactory.  Impression:  As above.    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 No acute fracture noted. Plan for WBAT in boot and start PT to work on soft tissue swelling and ROM.

## 2024-06-10 RX ORDER — ABACAVIR SULFATE, DOLUTEGRAVIR SODIUM, LAMIVUDINE 600; 50; 300 MG/1; MG/1; MG/1
1 TABLET, FILM COATED ORAL
Qty: 30 TABLET | Refills: 5 | Status: SHIPPED | OUTPATIENT
Start: 2024-06-10

## 2024-06-13 ENCOUNTER — CLINICAL SUPPORT (OUTPATIENT)
Facility: HOSPITAL | Age: 34
End: 2024-06-13
Attending: STUDENT IN AN ORGANIZED HEALTH CARE EDUCATION/TRAINING PROGRAM
Payer: MEDICARE

## 2024-06-13 DIAGNOSIS — M25.672 DECREASED RANGE OF MOTION OF LEFT ANKLE: Primary | ICD-10-CM

## 2024-06-13 DIAGNOSIS — S93.492A SPRAIN OF OTHER LIGAMENT OF LEFT ANKLE, INITIAL ENCOUNTER: ICD-10-CM

## 2024-06-13 PROBLEM — S93.402A SPRAIN OF LEFT ANKLE: Status: ACTIVE | Noted: 2024-06-13

## 2024-06-13 PROCEDURE — 97161 PT EVAL LOW COMPLEX 20 MIN: CPT | Mod: PN | Performed by: PHYSICAL THERAPIST

## 2024-06-13 PROCEDURE — 97140 MANUAL THERAPY 1/> REGIONS: CPT | Mod: PN | Performed by: PHYSICAL THERAPIST

## 2024-06-13 PROCEDURE — 97110 THERAPEUTIC EXERCISES: CPT | Mod: PN | Performed by: PHYSICAL THERAPIST

## 2024-06-13 NOTE — PLAN OF CARE
[facility]   Physical Therapy Initial Evaluation     Date: 6/13/2024     Name: Alonzo Rajput  Clinic Number: [id number]  Therapy Diagnosis:   Encounter Diagnoses   Name Primary?    Sprain of other ligament of left ankle, initial encounter     Decreased range of motion of left ankle Yes      Physician: Tigre Burgos     Physician Orders: PT Eval and Treat  Medical Diagnosis from Referral:  Encounter Diagnoses   Name Primary?    Sprain of other ligament of left ankle, initial encounter     Decreased range of motion of left ankle Yes     Evaluation Date: 6/13/2024  Authorization Period Expiration: 6/7/2025  Plan of Care Expiration: 9/13/2024   Progress Update: 7/13/2024   Visit # / Visits authorized: 1 / 1   FOTO: Visit #1 6/13/2024 - Scored: 1 / 3     PRECAUTIONS: Standard Precautions     Patient School:     Job/Position: Data Unavailable     Time In: 5:00  Time Out: 6:00  Total Appointment Time (timed & untimed codes): 55    SUBJECTIVE   History of current condition - Alonzo is a 33 y.o. male who presents to physical therapy reporting that he sprained his left ankle playing basketball on June 1st while at a basketball tournament in [place]. He has been unable to put much weight on his ankle because of the pain. He was originally told he had an ankle fracture but Dr Burgos did not see a fracture on the CT scan that was done last week. He reports he's able to walk in his boot without crutches but cannot walk without the boot currently.    Imaging: [x] Xray [x] MRI [] CT: Reviewed    Social History: Pt lives with their family   Prior Level of Function: Independent with all activities of daily living  Current Level of Function: unable to walk without boot, uses crutches for stability while walking in boot.    Pain:  Current 4/10, worst 7/10, best 3 /10   Location: [] Right [x] Left [] Bilateral: Ankle  Description: ache, sharp with movement  Aggravating Factors:  movement  Easing Factors: activity avoidance, rest, ice    Pts goals: Pt reported goals are to improve pain and function    _______________________________________________________  Medical History:   Past Medical History:   Diagnosis Date    Allergy     Bronchitis     Cancer     lymphoma    Candida esophagitis     Diffuse large B cell lymphoma     DLBCL (diffuse large B cell lymphoma) 11/5/2018    Ear infection     Encounter for blood transfusion     Fever blister     Hepatitis B     HIV (human immunodeficiency virus infection)     UGI bleed        Surgical History:   Alonzo Rajput  has a past surgical history that includes Esophagogastroduodenoscopy; Upper gastrointestinal endoscopy; Portacath placement; port a cath removal; Insertion of tunneled central venous catheter (CVC) with subcutaneous port (Left, 9/13/2018); Lymph node biopsy (Left, 9/13/2018); and Bone marrow biopsy (Right, 4/25/2019).    Medications:   Alonzo has a current medication list which includes the following prescription(s): azelastine, cetirizine, diphenhydramine, hydrocodone-acetaminophen, lisinopril, omeprazole, promethazine-dextromethorphan, and triumeq.    Allergies:   Review of patient's allergies indicates:   Allergen Reactions    Amoxicillin Swelling and Anaphylaxis          OBJECTIVE       PROM:  Ankle Left Right   Dorsiflexion Knee Extended 0 degrees 5 degrees   Plantarflexion 25 degrees 35 degrees   Inversion 10 degrees 15 degrees   Eversion 0 degrees 5 degrees     MMT:  Right LE  Left LE    Knee extension: 5/5 Knee extension: 5/5   Knee flexion: 5/5 Knee flexion: 5/5   Hip flexion: 5/5 Hip flexion: 5/5   Hip extension:  5/5 Hip extension: 5/5   Hip abduction: 4-/5 Hip abduction: 4-/5   Hip adduction: 4+/5 Hip adduction 4+/5     Ankle Left Right   Dorsiflexion 3+/5 5/5   Plantarflexion 3+/5 5/5   Inversion 3+/5 5/5   Eversion 3+/5 5/5     Joint Mobility: decreased TC mobility into DF and PF    Palpation: TTP AITFL and ATFL        FUNCTION:     CMS Impairment/Limitation/Restriction for FOTO Ankle Survey    Therapist reviewed FOTO scores for Alonzo Rajput on 6/13/2024.   FOTO documents entered into rumr: turn off the lights - see Media section.    Limitation Score: %         TREATMENT     Total Treatment time separate from Evaluation: (40) minutes    Alonzo received the following interventions:     MANUAL THERAPY TECHNIQUES: Joint mobilizations, Soft tissue Mobilization, and mobilization with movement were applied to the area listed below for (10) minutes, including: x = exercises performed   TC DF mobs in supine    THERAPEUTIC EXERCISES: to develop strength, endurance, ROM, flexibility, posture and core stabilization for (30)  minutes including: x = performed today  Bike L3 8'  Gastroc Stretch 5x30s  Ankle TB 4-Way GTB  HEP given of above exercises    PATIENT EDUCATION AND HOME EXERCISES     Education/Self-Care provided:  (included in treatment) minutes   Patient educated on the impairments noted above and the effects of physical therapy intervention to improve overall condition and Quality of Life  Patient was educated on all the above exercise prior/during/after for proper posture, positioning, and execution for safe performance with home exercise program.     Written Home Exercises Provided: yes. Prefers: [x] Printed [] Electronic  Exercises were reviewed and Alonzo was able to demonstrate them prior to the end of the session.  Alonzo demonstrated good understanding of the education provided. See EMR under Patient Instructions for exercises provided during therapy sessions.      ASSESSMENT     Patient presents with signs and symptoms consistent with a diagnosis of a left high ankle sprain including all above listed objective impairments. It appears that the patients most significant impairments contributing to their diagnosis are decreased ANKLE ROM and strength. This pt is a good candidate for skilled PT treatment and stands to benefit from a  combination of manual therapy, therapeutic exercise/actvities, neuromuscular re-education, and modalities Prn. The pt has been educated on their dx/POC and consents to further PT treatment.     Pt prognosis is Good.   Pt will benefit from skilled outpatient Physical Therapy to address the deficits stated above and in the chart below, provide pt/family education, and to maximize pt's level of independence.     Plan of care discussed with patient: Yes  Pt's spiritual, cultural and educational needs considered and patient is agreeable to the plan of care and goals as stated below:     Anticipated Barriers for therapy: none    Medical Necessity is demonstrated by the following:   Medical Necessity is demonstrated by the following  History  Co-morbidities and personal factors that may impact the plan of care [x] LOW: no personal factors / co-morbidities  [] MODERATE: 1-2 personal factors / co-morbidities  [] HIGH: 3+ personal factors / co-morbidities    Moderate / High Support Documentation:   Co-morbidities affecting plan of care:    Personal Factors:      Examination  Body Structures and Functions, activity limitations and participation restrictions that may impact the plan of care [x] LOW: addressing 1-2 elements  [] MODERATE: 3+ elements  [] HIGH: 4+ elements (please support below)    Moderate / High Support Documentation:     Clinical Presentation [x] LOW: stable  [] MODERATE: Evolving  [] HIGH: Unstable     Decision Making/ Complexity Score: low         SHORT TERM GOALS:  4 week Progress Date Met   Recent signs and systems trend is improving in order to progress towards Long term goals.  [] Met  [] Not Met  [] Progressing    Patient will be independent with Home Exercise Program  in order to further progress and return to maximal function. [] Met  [] Not Met  [] Progressing    Pain rating at Worst: 5 /10 in order to progress towards increased independence with activity. [] Met  [] Not Met  [] Progressing    Patient  will be able to correct postural deviations in sitting and standing, to decrease pain and promote postural awareness for injury prevention.  [] Met  [] Not Met  [] Progressing    Patient will improve functional outcome (FOTO) score: by 5% to increase self-worth & perceived functional ability towards long term goals [] Met  [] Not Met  [] Progressing      LONG TERM GOALS: 8 weeks Progress Date Met   Patient will return to normal activites of daily living, recreational, and work related activities with less pain and limitation.  [] Met  [] Not Met  [] Progressing    Patient will improve range of motion  to stated goals in order to return to maximal functional potential.  [] Met  [] Not Met  [] Progressing    Patient will improve Strength to stated goals of appropriate musculature in order to improve functional independence.  [] Met  [] Not Met  [] Progressing    Pain Rating at Best: 1/10 to improve Quality of Life.  [] Met  [] Not Met  [] Progressing    Patient will have met/partially met personal goal of: performing all ADL without pain or assistive device [] Met  [] Not Met  [] Progressing      PLAN   Plan of care Certification: 6/13/2024 to 9/13/2024    Outpatient Physical Therapy 2 times weekly for 8 weeks to include any combination of the following interventions: virtual visits, dry needling, modalities, electrical stimulation (IFC, Pre-Mod, Attended with Functional Dry Needling), Manual Therapy, Moist Heat/ Ice, Neuromuscular Re-ed, Patient Education, Self Care, Therapeutic Exercise, Functional Training, and Therapeutic Activites     Thank you for this referral.    Chino Krishna, PT

## 2024-06-18 ENCOUNTER — CLINICAL SUPPORT (OUTPATIENT)
Facility: HOSPITAL | Age: 34
End: 2024-06-18
Payer: MEDICARE

## 2024-06-18 DIAGNOSIS — M25.672 DECREASED RANGE OF MOTION OF LEFT ANKLE: Primary | ICD-10-CM

## 2024-06-18 PROCEDURE — 97140 MANUAL THERAPY 1/> REGIONS: CPT | Mod: PN | Performed by: PHYSICAL THERAPIST

## 2024-06-18 PROCEDURE — 97016 VASOPNEUMATIC DEVICE THERAPY: CPT | Mod: PN | Performed by: PHYSICAL THERAPIST

## 2024-06-18 PROCEDURE — 97110 THERAPEUTIC EXERCISES: CPT | Mod: PN | Performed by: PHYSICAL THERAPIST

## 2024-06-18 NOTE — PROGRESS NOTES
[facility]   Physical Therapy Treatment Note      Name: Alonzo Rajput  Clinic Number: [id number]    Therapy Diagnosis:   Encounter Diagnosis   Name Primary?    Decreased range of motion of left ankle Yes     Physician: Tigre Burgos  Evaluation Date: 6/13/2024  Authorization Period Expiration: 12/31/24  Plan of Care Expiration: 9/13/2024    Progress Update: 7/13/2024   Visit # / Visits authorized: 1 / 20          FOTO: Visit #1 6/13/2024 - Scored: 1 / 3        Time In: 4:10  Time Out: 5:15  Total Billable Time: 27 minutes Billing reflects 1:1 direct supervision    MD follow-up:    Precautions: Standard    FOTO:  -Intake:   -Status: incomplete  -Discharge: incomplete    Subjective     Pt reports: Ankle is feeling much better.  He was compliant with home exercise program.  Response to previous treatment: Improving symptoms  Functional change: Gradually improving function    Pain: Moderate/10  Location: left ankle     Objective      Objective Measures updated at progress report unless specified otherwise.    Problem list      Treatment     Alonzo received the treatments listed below:      Intervention Performed  Today Code  (see below chart) Notes   Manual x MT    Bike x TE* 10 MIN   Gastroc Stretch x TE 10s holds x 2 min   Ankle 4-Way x TE YTB 20x ea (move up resistance next time)   Mini squat x NR 4x15   Game ready x  10 min     10 minutes of Therapeutic Exercise (TE) to develop strength, endurance, ROM, and flexibility. (50382)  12 minutes of Manual therapy (MT) to improve pain and ROM. (42000)  05 minutes of Neuromuscular Re-Education (NR)  to improve: Balance, Coordination, Kinesthetic, Sense, Proprioception, and Posture. (02575)  0 minutes of Therapeutic Activities (TA) to improve functional performance. (61012)  0 Unattended Electrical Stimulation (ES) for muscle performance and/or pain modulation. (78718)  0 Vasopneumatic Device Therapy () for management of  swelling/edema. (40762)  BFR: Blood flow restriction applied during exercise  NP: Not Performed      Patient Education and Home Exercises         Education provided:   Physical therapy diagnosis, prognosis, and plan of care.     Written Home Exercises Provided: Patient instructed to cont prior HEP.  Exercises were reviewed and Alonzo was able to demonstrate them prior to the end of the session.  Alonzo demonstrated good  understanding of the education provided.     See EMR under Patient Instructions for exercises provided prior visit.    Assessment     6/18- Patient continues to have impaired functional mobility and pain, but is showing good progression of ankle range of motion and gait with boot.     Alonzo Is progressing well towards his goals.   Pt prognosis is Good.     Pt will continue to benefit from skilled outpatient physical therapy to address the deficits listed in the problem list box on initial evaluation, provide pt/family education and to maximize pt's level of independence in the home and community environment.     Pt's spiritual, cultural and educational needs considered and pt agreeable to plan of care and goals.    Anticipated barriers to physical therapy: None     Goals:     SHORT TERM GOALS:  4 week Progress Date Met   Recent signs and systems trend is improving in order to progress towards Long term goals.  [] Met  [] Not Met  [] Progressing     Patient will be independent with Home Exercise Program  in order to further progress and return to maximal function. [] Met  [] Not Met  [] Progressing     Pain rating at Worst: 5 /10 in order to progress towards increased independence with activity. [] Met  [] Not Met  [] Progressing     Patient will be able to correct postural deviations in sitting and standing, to decrease pain and promote postural awareness for injury prevention.  [] Met  [] Not Met  [] Progressing     Patient will improve functional outcome (FOTO) score: by 5% to increase self-worth  & perceived functional ability towards long term goals [] Met  [] Not Met  [] Progressing        LONG TERM GOALS: 8 weeks Progress Date Met   Patient will return to normal activites of daily living, recreational, and work related activities with less pain and limitation.  [] Met  [] Not Met  [] Progressing     Patient will improve range of motion  to stated goals in order to return to maximal functional potential.  [] Met  [] Not Met  [] Progressing     Patient will improve Strength to stated goals of appropriate musculature in order to improve functional independence.  [] Met  [] Not Met  [] Progressing     Pain Rating at Best: 1/10 to improve Quality of Life.  [] Met  [] Not Met  [] Progressing     Patient will have met/partially met personal goal of: performing all ADL without pain or assistive device [] Met  [] Not Met  [] Progressing          Plan   Plan of care Certification: 6/13/2024 to 9/13/2024       Continue to progress per protocol and per patient tolerance      Romain Marshall, PT

## 2024-06-19 ENCOUNTER — CLINICAL SUPPORT (OUTPATIENT)
Facility: HOSPITAL | Age: 34
End: 2024-06-19
Payer: MEDICARE

## 2024-06-19 DIAGNOSIS — M25.672 DECREASED RANGE OF MOTION OF LEFT ANKLE: Primary | ICD-10-CM

## 2024-06-19 PROCEDURE — 97530 THERAPEUTIC ACTIVITIES: CPT | Mod: PN | Performed by: PHYSICAL THERAPIST

## 2024-06-19 PROCEDURE — 97110 THERAPEUTIC EXERCISES: CPT | Mod: PN | Performed by: PHYSICAL THERAPIST

## 2024-06-24 ENCOUNTER — CLINICAL SUPPORT (OUTPATIENT)
Facility: HOSPITAL | Age: 34
End: 2024-06-24
Payer: MEDICARE

## 2024-06-24 DIAGNOSIS — M25.672 DECREASED RANGE OF MOTION OF LEFT ANKLE: Primary | ICD-10-CM

## 2024-06-24 PROCEDURE — 97016 VASOPNEUMATIC DEVICE THERAPY: CPT | Mod: PN | Performed by: PHYSICAL THERAPIST

## 2024-06-24 PROCEDURE — 97140 MANUAL THERAPY 1/> REGIONS: CPT | Mod: PN | Performed by: PHYSICAL THERAPIST

## 2024-06-24 PROCEDURE — 97110 THERAPEUTIC EXERCISES: CPT | Mod: PN | Performed by: PHYSICAL THERAPIST

## 2024-06-24 PROCEDURE — 97112 NEUROMUSCULAR REEDUCATION: CPT | Mod: PN | Performed by: PHYSICAL THERAPIST

## 2024-06-24 NOTE — PROGRESS NOTES
[facility]   Physical Therapy Treatment Note      Name: Alonzo Rajput  Clinic Number: [id number]    Therapy Diagnosis:   Encounter Diagnosis   Name Primary?    Decreased range of motion of left ankle Yes     Physician: Tigre Burgos  Evaluation Date: 6/13/2024  Authorization Period Expiration: 12/31/24  Plan of Care Expiration: 9/13/2024    Progress Update: 7/13/2024   Visit # / Visits authorized: 1 / 20          FOTO: Visit #1 6/13/2024 - Scored: 1 / 3        Time In: 4:10  Time Out: 5:15  Total Billable Time: 27 minutes Billing reflects 1:1 direct supervision    MD follow-up:    Precautions: Standard    FOTO:  -Intake:   -Status: incomplete  -Discharge: incomplete    Subjective     Pt reports: Ankle is feeling much better.  He was compliant with home exercise program.  Response to previous treatment: Improving symptoms  Functional change: Gradually improving function    Pain: Moderate/10  Location: left ankle     Objective      Objective Measures updated at progress report unless specified otherwise.    Problem list      Treatment     Alonzo received the treatments listed below:      Intervention Performed  Today Code  (see below chart) Notes   Manual x MT TC joint mobs Gr III and IV   Bike x TE* 10 MIN   Gastroc Stretch x TE 5x30s   Ankle 4-Way x TE GTB 20x ea (move up resistance next time)   Ankle DF mobs with powerband X TE 20x   Mini squat x NR 4x15   Alter-G X NR 2.5 mph 50%  10'   Game ready x  10 min     15 minutes of Therapeutic Exercise (TE) to develop strength, endurance, ROM, and flexibility. (67406)  15 minutes of Manual therapy (MT) to improve pain and ROM. (67338)  15 minutes of Neuromuscular Re-Education (NR)  to improve: Balance, Coordination, Kinesthetic, Sense, Proprioception, and Posture. (28844)  0 minutes of Therapeutic Activities (TA) to improve functional performance. (58418)  0 Unattended Electrical Stimulation (ES) for muscle performance  and/or pain modulation. (46563)  10 Vasopneumatic Device Therapy () for management of swelling/edema. (00149)  BFR: Blood flow restriction applied during exercise  NP: Not Performed      Patient Education and Home Exercises         Education provided:   Physical therapy diagnosis, prognosis, and plan of care.     Written Home Exercises Provided: Patient instructed to cont prior HEP.  Exercises were reviewed and Alonzo was able to demonstrate them prior to the end of the session.  Alonzo demonstrated good  understanding of the education provided.     See EMR under Patient Instructions for exercises provided prior visit.    Assessment     6/18- Patient continues to have impaired functional mobility and pain, but is showing good progression of ankle range of motion and gait with boot.     Alonzo Is progressing well towards his goals.   Pt prognosis is Good.     Pt will continue to benefit from skilled outpatient physical therapy to address the deficits listed in the problem list box on initial evaluation, provide pt/family education and to maximize pt's level of independence in the home and community environment.     Pt's spiritual, cultural and educational needs considered and pt agreeable to plan of care and goals.    Anticipated barriers to physical therapy: None     Goals:     SHORT TERM GOALS:  4 week Progress Date Met   Recent signs and systems trend is improving in order to progress towards Long term goals.  [] Met  [] Not Met  [] Progressing     Patient will be independent with Home Exercise Program  in order to further progress and return to maximal function. [] Met  [] Not Met  [] Progressing     Pain rating at Worst: 5 /10 in order to progress towards increased independence with activity. [] Met  [] Not Met  [] Progressing     Patient will be able to correct postural deviations in sitting and standing, to decrease pain and promote postural awareness for injury prevention.  [] Met  [] Not Met  []  Progressing     Patient will improve functional outcome (FOTO) score: by 5% to increase self-worth & perceived functional ability towards long term goals [] Met  [] Not Met  [] Progressing        LONG TERM GOALS: 8 weeks Progress Date Met   Patient will return to normal activites of daily living, recreational, and work related activities with less pain and limitation.  [] Met  [] Not Met  [] Progressing     Patient will improve range of motion  to stated goals in order to return to maximal functional potential.  [] Met  [] Not Met  [] Progressing     Patient will improve Strength to stated goals of appropriate musculature in order to improve functional independence.  [] Met  [] Not Met  [] Progressing     Pain Rating at Best: 1/10 to improve Quality of Life.  [] Met  [] Not Met  [] Progressing     Patient will have met/partially met personal goal of: performing all ADL without pain or assistive device [] Met  [] Not Met  [] Progressing          Plan   Plan of care Certification: 6/13/2024 to 9/13/2024       Continue to progress per protocol and per patient tolerance      Chino Krishna, PT

## 2024-06-25 NOTE — PROGRESS NOTES
[facility]   Physical Therapy Treatment Note      Name: Alonzo Rajput  Clinic Number: [id number]    Therapy Diagnosis:   Encounter Diagnosis   Name Primary?    Decreased range of motion of left ankle Yes     Physician: Tigre Burgos  Evaluation Date: 6/13/2024  Authorization Period Expiration: 12/31/24  Plan of Care Expiration: 9/13/2024    Progress Update: 7/13/2024   Visit # / Visits authorized: 1 / 20          FOTO: Visit #1 6/13/2024 - Scored: 1 / 3        Time In: 4:20  Time Out: 5:20  Total Billable Time: 35 minutes Billing reflects 1:1 direct supervision    MD follow-up:    Precautions: Standard    FOTO:  -Intake:   -Status: incomplete  -Discharge: incomplete    Subjective     Pt reports: 6/19- No issues after last visit. He is feeling better.  He was compliant with home exercise program.  Response to previous treatment: Improving symptoms  Functional change: Gradually improving function    Pain: Moderate/10  Location: left ankle     Objective      Objective Measures updated at progress report unless specified otherwise.    Problem list      Treatment     Alonzo received the treatments listed below:      Intervention Performed  Today Code  (see below chart) Notes   Manual x MT Ankle distraction  Ankle AP mobs   Bike x TE 10 MIN   Gastroc Stretch x TE 10s holds x 2 min   Ankle 4-Way x TE YTB 20x ea (move up resistance next time)   Mini squat x NR 4x15   Alter-G x TA 10' walking, 50%   Tandem Balance x NR 3x30s     10 minutes of Therapeutic Exercise (TE) to develop strength, endurance, ROM, and flexibility. (86424)  10 minutes of Manual therapy (MT) to improve pain and ROM. (85366)  05 minutes of Neuromuscular Re-Education (NMR)  to improve: Balance, Coordination, Kinesthetic, Sense, Proprioception, and Posture. (81295)  10 minutes of Therapeutic Activities (TA) to improve functional performance. (39433)  Unattended Electrical Stimulation (ES) for muscle performance  and/or pain modulation. (51037)  Vasopneumatic Device Therapy () for management of swelling/edema. (63086)  BFR: Blood flow restriction applied during exercise  NP: Not Performed      Patient Education and Home Exercises         Education provided:   Physical therapy diagnosis, prognosis, and plan of care.     Written Home Exercises Provided: Patient instructed to cont prior HEP.  Exercises were reviewed and Alonzo was able to demonstrate them prior to the end of the session.  Alonzo demonstrated good  understanding of the education provided.     See EMR under Patient Instructions for exercises provided prior visit.    Assessment     6/19- Patient with good tolerance to introduction of Alter G gait. Less pain with weight bearing. Continue with gradual functional progression.    Alonzo Is progressing well towards his goals.   Pt prognosis is Good.     Pt will continue to benefit from skilled outpatient physical therapy to address the deficits listed in the problem list box on initial evaluation, provide pt/family education and to maximize pt's level of independence in the home and community environment.     Pt's spiritual, cultural and educational needs considered and pt agreeable to plan of care and goals.    Anticipated barriers to physical therapy: None     Goals:     SHORT TERM GOALS:  4 week Progress Date Met   Recent signs and systems trend is improving in order to progress towards Long term goals.  [] Met  [] Not Met  [] Progressing     Patient will be independent with Home Exercise Program  in order to further progress and return to maximal function. [] Met  [] Not Met  [] Progressing     Pain rating at Worst: 5 /10 in order to progress towards increased independence with activity. [] Met  [] Not Met  [] Progressing     Patient will be able to correct postural deviations in sitting and standing, to decrease pain and promote postural awareness for injury prevention.  [] Met  [] Not Met  [] Progressing      Patient will improve functional outcome (FOTO) score: by 5% to increase self-worth & perceived functional ability towards long term goals [] Met  [] Not Met  [] Progressing        LONG TERM GOALS: 8 weeks Progress Date Met   Patient will return to normal activites of daily living, recreational, and work related activities with less pain and limitation.  [] Met  [] Not Met  [] Progressing     Patient will improve range of motion  to stated goals in order to return to maximal functional potential.  [] Met  [] Not Met  [] Progressing     Patient will improve Strength to stated goals of appropriate musculature in order to improve functional independence.  [] Met  [] Not Met  [] Progressing     Pain Rating at Best: 1/10 to improve Quality of Life.  [] Met  [] Not Met  [] Progressing     Patient will have met/partially met personal goal of: performing all ADL without pain or assistive device [] Met  [] Not Met  [] Progressing          Plan   Plan of care Certification: 6/13/2024 to 9/13/2024       Continue to progress per protocol and per patient tolerance      Romain Marshall, PT

## 2024-06-27 ENCOUNTER — CLINICAL SUPPORT (OUTPATIENT)
Facility: HOSPITAL | Age: 34
End: 2024-06-27
Payer: MEDICARE

## 2024-06-27 DIAGNOSIS — M25.672 DECREASED RANGE OF MOTION OF LEFT ANKLE: Primary | ICD-10-CM

## 2024-06-27 PROCEDURE — 97140 MANUAL THERAPY 1/> REGIONS: CPT | Mod: PN | Performed by: PHYSICAL THERAPIST

## 2024-06-27 PROCEDURE — 97110 THERAPEUTIC EXERCISES: CPT | Mod: PN | Performed by: PHYSICAL THERAPIST

## 2024-06-27 PROCEDURE — 97112 NEUROMUSCULAR REEDUCATION: CPT | Mod: PN | Performed by: PHYSICAL THERAPIST

## 2024-06-27 PROCEDURE — 97016 VASOPNEUMATIC DEVICE THERAPY: CPT | Mod: PN | Performed by: PHYSICAL THERAPIST

## 2024-06-27 NOTE — PROGRESS NOTES
[facility]   Physical Therapy Treatment Note      Name: Alonzo Rajput  Clinic Number: [id number]    Therapy Diagnosis:   Encounter Diagnosis   Name Primary?    Decreased range of motion of left ankle Yes     Physician: Tigre Burgos  Evaluation Date: 6/13/2024  Authorization Period Expiration: 12/31/24  Plan of Care Expiration: 9/13/2024    Progress Update: 7/13/2024   Visit # / Visits authorized: 4 / 20          FOTO: Visit #1 6/13/2024 - Scored: 1 / 3      Time In: 4:10  Time Out: 5:15  Total Billable Time: 27 minutes Billing reflects 1:1 direct supervision    MD follow-up:    Precautions: Standard    FOTO:  -Intake:   -Status: incomplete  -Discharge: incomplete    Subjective     Pt reports: Ankle is feeling much better.  He was compliant with home exercise program.  Response to previous treatment: Improving symptoms  Functional change: Gradually improving function    Pain: Moderate/10  Location: left ankle     Objective      Objective Measures updated at progress report unless specified otherwise.    Problem list      Treatment     Alonzo received the treatments listed below:      Intervention Performed  Today Code  (see below chart) Notes   Manual x MT TC joint mobs Gr III and IV   Bike x TE* 10 MIN   Gastroc Stretch x TE 5x30s   Ankle 3-Way (no DF) x NR BTB 2x15 ea (move up resistance next time)   DL Calf Raises X TE 3x15   Ankle DF mobs with powerband X TE 20x   Squat To Box x NR 4x15   Alter-G X NR 2.5 mph 50%  10'   Game ready x  10 min     15 minutes of Therapeutic Exercise (TE) to develop strength, endurance, ROM, and flexibility. (02555)  15 minutes of Manual therapy (MT) to improve pain and ROM. (25249)  30 minutes of Neuromuscular Re-Education (NR)  to improve: Balance, Coordination, Kinesthetic, Sense, Proprioception, and Posture. (36518)  0 minutes of Therapeutic Activities (TA) to improve functional performance. (87131)  0 Unattended Electrical  Stimulation (ES) for muscle performance and/or pain modulation. (86780)  10 Vasopneumatic Device Therapy () for management of swelling/edema. (94432)  BFR: Blood flow restriction applied during exercise  NP: Not Performed      Patient Education and Home Exercises         Education provided:   Physical therapy diagnosis, prognosis, and plan of care.     Written Home Exercises Provided: Patient instructed to cont prior HEP.  Exercises were reviewed and Alonzo was able to demonstrate them prior to the end of the session.  Alonzo demonstrated good  understanding of the education provided.     See EMR under Patient Instructions for exercises provided prior visit.    Assessment     Patient continues to have impaired functional mobility and pain, but is showing good progression of ankle range of motion and gait with boot.     Alonzo Is progressing well towards his goals.   Pt prognosis is Good.     Pt will continue to benefit from skilled outpatient physical therapy to address the deficits listed in the problem list box on initial evaluation, provide pt/family education and to maximize pt's level of independence in the home and community environment.     Pt's spiritual, cultural and educational needs considered and pt agreeable to plan of care and goals.    Anticipated barriers to physical therapy: None     Goals:     SHORT TERM GOALS:  4 week Progress Date Met   Recent signs and systems trend is improving in order to progress towards Long term goals.  [] Met  [] Not Met  [] Progressing     Patient will be independent with Home Exercise Program  in order to further progress and return to maximal function. [] Met  [] Not Met  [] Progressing     Pain rating at Worst: 5 /10 in order to progress towards increased independence with activity. [] Met  [] Not Met  [] Progressing     Patient will be able to correct postural deviations in sitting and standing, to decrease pain and promote postural awareness for injury prevention.   [] Met  [] Not Met  [] Progressing     Patient will improve functional outcome (FOTO) score: by 5% to increase self-worth & perceived functional ability towards long term goals [] Met  [] Not Met  [] Progressing        LONG TERM GOALS: 8 weeks Progress Date Met   Patient will return to normal activites of daily living, recreational, and work related activities with less pain and limitation.  [] Met  [] Not Met  [] Progressing     Patient will improve range of motion  to stated goals in order to return to maximal functional potential.  [] Met  [] Not Met  [] Progressing     Patient will improve Strength to stated goals of appropriate musculature in order to improve functional independence.  [] Met  [] Not Met  [] Progressing     Pain Rating at Best: 1/10 to improve Quality of Life.  [] Met  [] Not Met  [] Progressing     Patient will have met/partially met personal goal of: performing all ADL without pain or assistive device [] Met  [] Not Met  [] Progressing          Plan   Plan of care Certification: 6/13/2024 to 9/13/2024     Continue to progress per protocol and per patient tolerance    Chino Krishna PT

## 2024-06-27 NOTE — PROGRESS NOTES
Orthopaedic Follow-Up Visit    Last Appointment: 6/7/24  Diagnosis: Left ankle distal fibula fracture   Prior Procedure: PT    Alonzo Rajput is a 33 y.o. male who is here for f/u evaluation of his left ankle. The patient was last seen here by me on 6/7/24 at which point imaging and physical exam findings were consistent with distal fibula fracture. However, there was atypical appearance on XR. I recommended proceeding with STAT CT scan left ankle for further evaluation. He was also placed in CAM boot at that time. 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 No acute fracture noted. Plan for WBAT in boot and start PT to work on soft tissue swelling and ROM.  The patient returns today reporting that he is improving with physical therapy.  Swelling has decreased but is not completely gone.  He is walking in a shoe without significant difficulty.      To review his history, Alonzo Rajput is a 33 y.o. male who presented on 6/7/24 with left ankle pain and dysfunction. The patient presented on referral after being seen in the ER on 6/5/24 at which time he noted a complaint of left ankle pain. Reported he twisted it while playing basketball on Saturday (6/1). The pain had progressively gotten worse since. The pain was described as dull and aching. The pain was continuous and worsened by ambulation. Reported not being able to ambulate since the injury occurred. Associated symptoms were swelling. Denied numbness or tingling. Was believed to have a lateral malleolus fracture based on X-ray and was placed in posterior short leg splint and referred to orthopedics. His symptoms included left ankle pain and swelling. Patient reported difficulty with ambulation and inability to bear weight since the injury.  His pain was aggravated by weight bearing.  He had tried rest, activity modification, short leg splint, and Norco     Patient's medications, allergies, past  medical, surgical, social and family histories were reviewed and updated as appropriate.    Review of Systems   All systems reviewed were negative.  Specifically, the patient denies fever, chills, weight loss, chest pain, shortness of breath, or dyspnea on exertion.      Past Medical History:   Diagnosis Date    Allergy     Bronchitis     Cancer     lymphoma    Candida esophagitis     Diffuse large B cell lymphoma     DLBCL (diffuse large B cell lymphoma) 11/5/2018    Ear infection     Encounter for blood transfusion     Fever blister     Hepatitis B     HIV (human immunodeficiency virus infection)     UGI bleed        Objective:      Physical Exam  Patient is alert and oriented, no distress. Skin is intact. Neuro is normal with no focal motor or sensory findings.    Standing exam  stance: normal alignment, no significant leg-length discrepancy  gait: no limp      Left Ankle:    Inspection:   Normal    Palpation tenderness: ATFL    Range of motion:  10 deg DF         30 deg PF           Strength:        5/5 DF          5/5 PF          5/5 Inversion          5/5 Eversion    Stability:  + Talar Tilt     - Anterior Drawer     - Kleiger/External Rotator Stress    Neurovascular exam  - motor function grossly intact bilaterally to hip flexion, knee extension and flexion, ankle dorsiflexion and plantarflexion  - sensation intact to light touch bilaterally to femoral, tibial, tibial and peroneal distributions  - normal pedal pulses, warm and well perfused with capillary refill < 2 sec   - Calf soft and compressible      Imaging:  XR Results:  Results for orders placed during the hospital encounter of 06/07/24    X-Ray Ankle 2 View Left    Narrative  EXAM: XR ANKLE 2 VIEW LEFT    CLINICAL HISTORY: Left ankle pain    TECHNIQUE: Left ankle, 2 views with stress    COMPARISON:  06/05/2024    FINDINGS: Periarticular soft tissue swelling.  AP Alignment is satisfactory.    Impression  As above.        Finalized on: 6/7/2024 11:03  AM By:  JUNIOR Kenney MD, MD  [facility]# [id number]      2024-06-07 11:05:20.011    [facility]      CT Results:  Results for orders placed during the hospital encounter of 06/07/24    CT Ankle (Including Hindfoot) Without Contrast Left    Narrative  EXAMINATION:  CT ANKLE (INCLUDING HINDFOOT) WITHOUT CONTRAST LEFT    CLINICAL HISTORY:  Fracture, ankle;  Other fracture of upper and lower end of left fibula, initial encounter for closed fracture    TECHNIQUE:  Noncontrast axial CT of the left ankle was performed with multiplanar sagittal and coronal reformations.    COMPARISON:  None.    FINDINGS:  There is prominent spurring at the anterolateral aspect of the distal tibia and anterior aspect of the adjacent distal fibula with pseudoarticulation, possibly chronic sequelae of prior ligamentous injury.  Small dystrophic soft tissue calcifications noted inferior to the distal fibula.    Minimal spurring present at the distal end of the medial malleolus, talar calcaneal articulation, and also at the medial, lateral, and dorsal margins of the talus.  Talar dome is intact and ankle mortise is symmetric.  No evidence of osteochondral lesion.  No acute fracture or dislocation.    There is mild diffuse soft tissue swelling and subcutaneous infiltration about the ankle and hindfoot.  No large joint effusion or fluid collection evident.  No soft tissue mass.    Impression  1. Suspected chronic posttraumatic sequelae at the distal aspect of the tibia and fibula as detailed.  2. Mild degenerative change of the ankle and hindfoot.      Electronically signed by: PATTI Perez MD  Date:    06/07/2024  Time:    13:45        Physician Read: I agree with the above impression.    Assessment/Plan:   Assessment:  Alonzo Rajput is a 33 y.o. 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  He has walking in a shoe without difficulty.  Has not yet returned to  sport.    Recommend that he progress in his rehab protocol.  May perform single leg balance and strengthening.    Continue with oral anti-inflammatories.  Advance activities as tolerated.  Follow-up with me as needed.        Tigre Burgos MD    I, Robin Armenta, acted as a scribe for Tigre Burgos MD for the duration of this office visit.

## 2024-07-01 ENCOUNTER — CLINICAL SUPPORT (OUTPATIENT)
Facility: HOSPITAL | Age: 34
End: 2024-07-01
Payer: MEDICARE

## 2024-07-01 DIAGNOSIS — M25.672 DECREASED RANGE OF MOTION OF LEFT ANKLE: Primary | ICD-10-CM

## 2024-07-01 PROCEDURE — 97112 NEUROMUSCULAR REEDUCATION: CPT | Mod: PN | Performed by: PHYSICAL THERAPIST

## 2024-07-01 PROCEDURE — 97110 THERAPEUTIC EXERCISES: CPT | Mod: PN | Performed by: PHYSICAL THERAPIST

## 2024-07-01 PROCEDURE — 97140 MANUAL THERAPY 1/> REGIONS: CPT | Mod: PN | Performed by: PHYSICAL THERAPIST

## 2024-07-02 NOTE — PROGRESS NOTES
[facility]   Physical Therapy Treatment Note      Name: Alonzo Rajput  Clinic Number: [id number]    Therapy Diagnosis:   Encounter Diagnosis   Name Primary?    Decreased range of motion of left ankle Yes     Physician: Tigre Burgos  Evaluation Date: 6/13/2024  Authorization Period Expiration: 12/31/24  Plan of Care Expiration: 9/13/2024    Progress Update: 7/13/2024   Visit # / Visits authorized: 5/ 20          FOTO: Visit #1 6/13/2024 - Scored: 1 / 3      Time In: 4:40  Time Out: 5:45  Total Billable Time: 65 minutes Billing reflects 1:1 direct supervision    MD follow-up:    Precautions: Standard    FOTO:  -Intake:   -Status: incomplete  -Discharge: incomplete    Subjective     Pt reports: Ankle is feeling much better when walking around in just a tennis shoe without the brace.  He was compliant with home exercise program.  Response to previous treatment: Improving symptoms  Functional change: Gradually improving function    Pain: Moderate/10  Location: left ankle     Objective      Objective Measures updated at progress report unless specified otherwise.    Problem list      Treatment     Alonzo received the treatments listed below:      Intervention  Performed   Today  Code   (see below chart)  Notes    Bike  x  TE  10 MIN    Gastroc Stretch  x  TE  10s holds x 2 min    Ankle 4-Way  x  TE  BTB 20x ea.     Mini squat  x  NR  4x15    Alter-G  x  TA  10' walking, 50%    Around the worlds  X  NR  3x10 CW, 3x10 CCW   (EO,EC,UP)    15 minutes of Therapeutic Exercise (TE) to develop strength, endurance, ROM, and flexibility. (26497)  10 minutes of Manual therapy (MT) to improve pain and ROM. (59080)  30 minutes of Neuromuscular Re-Education (NR)  to improve: Balance, Coordination, Kinesthetic, Sense, Proprioception, and Posture. (02528)  0 minutes of Therapeutic Activities (TA) to improve functional performance. (87407)  0 Unattended Electrical Stimulation (ES) for muscle  performance and/or pain modulation. ([id number])  10 Vasopneumatic Device Therapy () for management of swelling/edema. (86292)      Patient Education and Home Exercises     Education provided:   Physical therapy diagnosis, prognosis, and plan of care.     Written Home Exercises Provided: Patient instructed to cont prior HEP.  Exercises were reviewed and Alonzo was able to demonstrate them prior to the end of the session.  Alonzo demonstrated good  understanding of the education provided.     See EMR under Patient Instructions for exercises provided prior visit.    Assessment   Patient's ankle mobility is improving and ankle is near symmetrical to opposite side.    Alonzo Is progressing well towards his goals.   Pt prognosis is Good.     Pt will continue to benefit from skilled outpatient physical therapy to address the deficits listed in the problem list box on initial evaluation, provide pt/family education and to maximize pt's level of independence in the home and community environment.     Pt's spiritual, cultural and educational needs considered and pt agreeable to plan of care and goals.    Anticipated barriers to physical therapy: None     Goals:     SHORT TERM GOALS:  4 week Progress Date Met   Recent signs and systems trend is improving in order to progress towards Long term goals.  [] Met  [] Not Met  [] Progressing     Patient will be independent with Home Exercise Program  in order to further progress and return to maximal function. [] Met  [] Not Met  [] Progressing     Pain rating at Worst: 5 /10 in order to progress towards increased independence with activity. [] Met  [] Not Met  [] Progressing     Patient will be able to correct postural deviations in sitting and standing, to decrease pain and promote postural awareness for injury prevention.  [] Met  [] Not Met  [] Progressing     Patient will improve functional outcome (FOTO) score: by 5% to increase self-worth & perceived functional ability towards  long term goals [] Met  [] Not Met  [] Progressing        LONG TERM GOALS: 8 weeks Progress Date Met   Patient will return to normal activites of daily living, recreational, and work related activities with less pain and limitation.  [] Met  [] Not Met  [] Progressing     Patient will improve range of motion  to stated goals in order to return to maximal functional potential.  [] Met  [] Not Met  [] Progressing     Patient will improve Strength to stated goals of appropriate musculature in order to improve functional independence.  [] Met  [] Not Met  [] Progressing     Pain Rating at Best: 1/10 to improve Quality of Life.  [] Met  [] Not Met  [] Progressing     Patient will have met/partially met personal goal of: performing all ADL without pain or assistive device [] Met  [] Not Met  [] Progressing          Plan   Plan of care Certification: 6/13/2024 to 9/13/2024     Continue to progress per protocol and per patient tolerance    Chino Krishna PT

## 2024-07-03 ENCOUNTER — OFFICE VISIT (OUTPATIENT)
Dept: SPORTS MEDICINE | Facility: CLINIC | Age: 34
End: 2024-07-03
Payer: MEDICARE

## 2024-07-03 VITALS — WEIGHT: 183 LBS | RESPIRATION RATE: 17 BRPM | BODY MASS INDEX: 27.11 KG/M2 | HEIGHT: 69 IN

## 2024-07-03 DIAGNOSIS — S93.492D SPRAIN OF OTHER LIGAMENT OF LEFT ANKLE, SUBSEQUENT ENCOUNTER: Primary | ICD-10-CM

## 2024-07-03 PROCEDURE — 99214 OFFICE O/P EST MOD 30 MIN: CPT | Mod: S$PBB,,, | Performed by: STUDENT IN AN ORGANIZED HEALTH CARE EDUCATION/TRAINING PROGRAM

## 2024-07-03 PROCEDURE — 99999 PR PBB SHADOW E&M-EST. PATIENT-LVL III: CPT | Mod: PBBFAC,,, | Performed by: STUDENT IN AN ORGANIZED HEALTH CARE EDUCATION/TRAINING PROGRAM

## 2024-07-03 PROCEDURE — 99213 OFFICE O/P EST LOW 20 MIN: CPT | Mod: PBBFAC | Performed by: STUDENT IN AN ORGANIZED HEALTH CARE EDUCATION/TRAINING PROGRAM

## 2024-07-08 ENCOUNTER — CLINICAL SUPPORT (OUTPATIENT)
Facility: HOSPITAL | Age: 34
End: 2024-07-08
Payer: MEDICARE

## 2024-07-08 DIAGNOSIS — M25.672 DECREASED RANGE OF MOTION OF LEFT ANKLE: Primary | ICD-10-CM

## 2024-07-08 NOTE — PROGRESS NOTES
"[facility]   Physical Therapy Treatment Note      Name: Alonzo Rajput  Clinic Number: [id number]    Therapy Diagnosis:   Encounter Diagnosis   Name Primary?    Decreased range of motion of left ankle Yes     Physician: Tigre Burgos  Evaluation Date: 6/13/2024  Authorization Period Expiration: 12/31/24  Plan of Care Expiration: 9/13/2024    Progress Update: 7/13/2024   Visit # / Visits authorized: 6 / 20          FOTO: Visit #1 6/13/2024 - Scored: 1 / 3      Time In: 4:30  Time Out: 5:30  Total Billable Time: 60 minutes Billing reflects 1:1 direct supervision    MD follow-up:    Precautions: Standard    FOTO:  -Intake:   -Status: incomplete  -Discharge: incomplete    Subjective     Pt reports: Ankle is feeling much better. Not painful with any movement but a little tender to the touch.  He was compliant with home exercise program.  Response to previous treatment: Improving symptoms  Functional change: Gradually improving function    Pain: Moderate/10  Location: left ankle     Objective      Objective Measures updated at progress report unless specified otherwise.    Problem list      Treatment     Alonzo received the treatments listed below:    Intervention  Performed   Today  Code   (see below chart)  Notes    Bike  X  TE  10 MIN    Manual therapy X MT DF mobs in supine   Ankle powerband mobs  X  TE BTB 20x ea.     Ankle 3 way  X  NR  3x10 each    Around the worlds  X  NR  3x10 CW, 3x10 CCW   (EO,EC,UP)    3 way calf raises  X  NR 15# DBs   3x10 each    Squat to box  X  NR 18" 3x10    Step Ups  X  NR  12" 3x10    Lateral Step Down  X  NR  6" 3x10    10 minutes of Therapeutic Exercise (TE) to develop strength, endurance, ROM, and flexibility. (71804)   5 minutes of Manual therapy (MT) to improve pain and ROM. (86246)   40 minutes of Neuromuscular Re-Education (NR)  to improve: Balance, Coordination, Kinesthetic, Sense, Proprioception, and Posture. (09753)   00 minutes of " Therapeutic Activities (TA) to improve functional performance. (14934)       Patient Education and Home Exercises     Education provided:   Physical therapy diagnosis, prognosis, and plan of care.     Written Home Exercises Provided: Patient instructed to cont prior HEP.  Exercises were reviewed and Alonzo was able to demonstrate them prior to the end of the session.  Alonzo demonstrated good  understanding of the education provided.     See EMR under Patient Instructions for exercises provided prior visit.    Assessment   Patient's ankle mobility is improving and ankle is near symmetrical to opposite side. Progressing well and may initiate some jogging in the [employer] at next few visits.    Alonzo Is progressing well towards his goals.   Pt prognosis is Good.     Pt will continue to benefit from skilled outpatient physical therapy to address the deficits listed in the problem list box on initial evaluation, provide pt/family education and to maximize pt's level of independence in the home and community environment.     Pt's spiritual, cultural and educational needs considered and pt agreeable to plan of care and goals.    Anticipated barriers to physical therapy: None     Goals:     SHORT TERM GOALS:  4 week Progress Date Met   Recent signs and systems trend is improving in order to progress towards Long term goals.  [] Met  [] Not Met  [] Progressing     Patient will be independent with Home Exercise Program  in order to further progress and return to maximal function. [] Met  [] Not Met  [] Progressing     Pain rating at Worst: 5 /10 in order to progress towards increased independence with activity. [] Met  [] Not Met  [] Progressing     Patient will be able to correct postural deviations in sitting and standing, to decrease pain and promote postural awareness for injury prevention.  [] Met  [] Not Met  [] Progressing     Patient will improve functional outcome (FOTO) score: by 5% to increase self-worth &  perceived functional ability towards long term goals [] Met  [] Not Met  [] Progressing        LONG TERM GOALS: 8 weeks Progress Date Met   Patient will return to normal activites of daily living, recreational, and work related activities with less pain and limitation.  [] Met  [] Not Met  [] Progressing     Patient will improve range of motion  to stated goals in order to return to maximal functional potential.  [] Met  [] Not Met  [] Progressing     Patient will improve Strength to stated goals of appropriate musculature in order to improve functional independence.  [] Met  [] Not Met  [] Progressing     Pain Rating at Best: 1/10 to improve Quality of Life.  [] Met  [] Not Met  [] Progressing     Patient will have met/partially met personal goal of: performing all ADL without pain or assistive device [] Met  [] Not Met  [] Progressing          Plan   Plan of care Certification: 6/13/2024 to 9/13/2024     Continue to progress per protocol and per patient tolerance    Chino Krishna PT

## 2024-07-11 ENCOUNTER — CLINICAL SUPPORT (OUTPATIENT)
Facility: HOSPITAL | Age: 34
End: 2024-07-11
Payer: MEDICARE

## 2024-07-11 DIAGNOSIS — M25.672 DECREASED RANGE OF MOTION OF LEFT ANKLE: Primary | ICD-10-CM

## 2024-07-11 PROCEDURE — 97110 THERAPEUTIC EXERCISES: CPT | Mod: PN | Performed by: PHYSICAL THERAPIST

## 2024-07-11 PROCEDURE — 97112 NEUROMUSCULAR REEDUCATION: CPT | Mod: PN | Performed by: PHYSICAL THERAPIST

## 2024-07-11 NOTE — PROGRESS NOTES
"[facility]   Physical Therapy Treatment Note      Name: Alonzo Rajput  Clinic Number: [id number]    Therapy Diagnosis:   Encounter Diagnosis   Name Primary?    Decreased range of motion of left ankle Yes     Physician: Tigre Burgos  Evaluation Date: 6/13/2024  Authorization Period Expiration: 12/31/24  Plan of Care Expiration: 9/13/2024    Progress Update: 7/13/2024   Visit # / Visits authorized: 6 / 20          FOTO: Visit #1 6/13/2024 - Scored: 1 / 3      Time In: 4:30  Time Out: 5:30  Total Billable Time: 60 minutes Billing reflects 1:1 direct supervision    MD follow-up:    Precautions: Standard    FOTO:  -Intake:   -Status: incomplete  -Discharge: incomplete    Subjective     Pt reports: Ankle is feeling much better. Not painful with any movement but a little tender to the touch.  He was compliant with home exercise program.  Response to previous treatment: Improving symptoms  Functional change: Gradually improving function    Pain: Moderate/10  Location: left ankle     Objective      Objective Measures updated at progress report unless specified otherwise.    Problem list      Treatment     Alonzo received the treatments listed below:    Intervention  Performed   Today  Code   (see below chart)  Notes    Bike  X  TE  10 MIN    Manual therapy X MT DF mobs in supine   Ankle powerband mobs  X  TE BTB 20x ea.     Ankle 3 way  X  NR  3x10 each    Around the worlds  X  NR  3x10 CW, 3x10 CCW   (EO,EC,UP)    3 way calf raises  X  NR 15# DBs   3x10 each    Squat to box  X  NR 18" 3x10    Step Ups  X  NR  12" 3x10    Lateral Step Down  X  NR  6" 3x10    10 minutes of Therapeutic Exercise (TE) to develop strength, endurance, ROM, and flexibility. (46514)   5 minutes of Manual therapy (MT) to improve pain and ROM. (32358)   40 minutes of Neuromuscular Re-Education (NR)  to improve: Balance, Coordination, Kinesthetic, Sense, Proprioception, and Posture. (10006)   00 minutes of " Therapeutic Activities (TA) to improve functional performance. (72297)       Patient Education and Home Exercises     Education provided:   Physical therapy diagnosis, prognosis, and plan of care.     Written Home Exercises Provided: Patient instructed to cont prior HEP.  Exercises were reviewed and Alonzo was able to demonstrate them prior to the end of the session.  Alonzo demonstrated good  understanding of the education provided.     See EMR under Patient Instructions for exercises provided prior visit.    Assessment   Patient's ankle mobility is improving and ankle is near symmetrical to opposite side. Progressing well and may initiate some jogging in the [employer] at next few visits.    Alonzo Is progressing well towards his goals.   Pt prognosis is Good.     Pt will continue to benefit from skilled outpatient physical therapy to address the deficits listed in the problem list box on initial evaluation, provide pt/family education and to maximize pt's level of independence in the home and community environment.     Pt's spiritual, cultural and educational needs considered and pt agreeable to plan of care and goals.    Anticipated barriers to physical therapy: None     Goals:     SHORT TERM GOALS:  4 week Progress Date Met   Recent signs and systems trend is improving in order to progress towards Long term goals.  [] Met  [] Not Met  [] Progressing     Patient will be independent with Home Exercise Program  in order to further progress and return to maximal function. [] Met  [] Not Met  [] Progressing     Pain rating at Worst: 5 /10 in order to progress towards increased independence with activity. [] Met  [] Not Met  [] Progressing     Patient will be able to correct postural deviations in sitting and standing, to decrease pain and promote postural awareness for injury prevention.  [] Met  [] Not Met  [] Progressing     Patient will improve functional outcome (FOTO) score: by 5% to increase self-worth &  perceived functional ability towards long term goals [] Met  [] Not Met  [] Progressing        LONG TERM GOALS: 8 weeks Progress Date Met   Patient will return to normal activites of daily living, recreational, and work related activities with less pain and limitation.  [] Met  [] Not Met  [] Progressing     Patient will improve range of motion  to stated goals in order to return to maximal functional potential.  [] Met  [] Not Met  [] Progressing     Patient will improve Strength to stated goals of appropriate musculature in order to improve functional independence.  [] Met  [] Not Met  [] Progressing     Pain Rating at Best: 1/10 to improve Quality of Life.  [] Met  [] Not Met  [] Progressing     Patient will have met/partially met personal goal of: performing all ADL without pain or assistive device [] Met  [] Not Met  [] Progressing          Plan   Plan of care Certification: 6/13/2024 to 9/13/2024     Continue to progress per protocol and per patient tolerance    Chino Krishna PT

## 2024-07-15 ENCOUNTER — CLINICAL SUPPORT (OUTPATIENT)
Facility: HOSPITAL | Age: 34
End: 2024-07-15
Payer: MEDICARE

## 2024-07-15 DIAGNOSIS — M25.672 DECREASED RANGE OF MOTION OF LEFT ANKLE: Primary | ICD-10-CM

## 2024-07-15 PROCEDURE — 97112 NEUROMUSCULAR REEDUCATION: CPT | Mod: PN | Performed by: PHYSICAL THERAPIST

## 2024-07-15 NOTE — PROGRESS NOTES
"[facility]   Physical Therapy Treatment Note      Name: Alonzo Rajput  Clinic Number: [id number]    Therapy Diagnosis:   Encounter Diagnosis   Name Primary?    Decreased range of motion of left ankle Yes     Physician: Tigre Burgos  Evaluation Date: 6/13/2024  Authorization Period Expiration: 12/31/24  Plan of Care Expiration: 9/13/2024    Progress Update: 7/13/2024   Visit # / Visits authorized: 6 / 20          FOTO: Visit #1 6/13/2024 - Scored: 1 / 3      Time In: 4:30  Time Out: 5:30  Total Billable Time: 60 minutes Billing reflects 1:1 direct supervision    MD follow-up:    Precautions: Standard    FOTO:  -Intake:   -Status: incomplete  -Discharge: incomplete    Subjective     Pt reports: Ankle is feeling much better. Not painful with any movement but a little tender to the touch.  He was compliant with home exercise program.  Response to previous treatment: Improving symptoms  Functional change: Gradually improving function    Pain: Moderate/10  Location: left ankle     Objective      Objective Measures updated at progress report unless specified otherwise.    Problem list      Treatment     Alonzo received the treatments listed below:    Intervention  Performed   Today  Code   (see below chart)  Notes    Bike  X  TE  10 MIN    Manual therapy X MT DF mobs in supine   Ankle powerband mobs  X  TE BTB 20x ea.     Ankle 3 way  X  NR  3x10 each    Around the worlds  X  NR  3x10 CW, 3x10 CCW   (EO,EC,UP)    3 way calf raises  X  NR 15# DBs   3x10 each    Squat to box  X  NR 18" 3x10    Step Ups  X  NR  12" 3x10    Lateral Step Down  X  NR  6" 3x10    10 minutes of Therapeutic Exercise (TE) to develop strength, endurance, ROM, and flexibility. (11682)   5 minutes of Manual therapy (MT) to improve pain and ROM. (60247)   40 minutes of Neuromuscular Re-Education (NR)  to improve: Balance, Coordination, Kinesthetic, Sense, Proprioception, and Posture. (77330)   00 minutes of " Therapeutic Activities (TA) to improve functional performance. (80792)       Patient Education and Home Exercises     Education provided:   Physical therapy diagnosis, prognosis, and plan of care.     Written Home Exercises Provided: Patient instructed to cont prior HEP.  Exercises were reviewed and Alonzo was able to demonstrate them prior to the end of the session.  Alonzo demonstrated good  understanding of the education provided.     See EMR under Patient Instructions for exercises provided prior visit.    Assessment   Patient's ankle mobility is improving and ankle is near symmetrical to opposite side. Progressing well and may initiate some jogging in the [employer] at next few visits.    Alonzo Is progressing well towards his goals.   Pt prognosis is Good.     Pt will continue to benefit from skilled outpatient physical therapy to address the deficits listed in the problem list box on initial evaluation, provide pt/family education and to maximize pt's level of independence in the home and community environment.     Pt's spiritual, cultural and educational needs considered and pt agreeable to plan of care and goals.    Anticipated barriers to physical therapy: None     Goals:     SHORT TERM GOALS:  4 week Progress Date Met   Recent signs and systems trend is improving in order to progress towards Long term goals.  [] Met  [] Not Met  [] Progressing     Patient will be independent with Home Exercise Program  in order to further progress and return to maximal function. [] Met  [] Not Met  [] Progressing     Pain rating at Worst: 5 /10 in order to progress towards increased independence with activity. [] Met  [] Not Met  [] Progressing     Patient will be able to correct postural deviations in sitting and standing, to decrease pain and promote postural awareness for injury prevention.  [] Met  [] Not Met  [] Progressing     Patient will improve functional outcome (FOTO) score: by 5% to increase self-worth &  perceived functional ability towards long term goals [] Met  [] Not Met  [] Progressing        LONG TERM GOALS: 8 weeks Progress Date Met   Patient will return to normal activites of daily living, recreational, and work related activities with less pain and limitation.  [] Met  [] Not Met  [] Progressing     Patient will improve range of motion  to stated goals in order to return to maximal functional potential.  [] Met  [] Not Met  [] Progressing     Patient will improve Strength to stated goals of appropriate musculature in order to improve functional independence.  [] Met  [] Not Met  [] Progressing     Pain Rating at Best: 1/10 to improve Quality of Life.  [] Met  [] Not Met  [] Progressing     Patient will have met/partially met personal goal of: performing all ADL without pain or assistive device [] Met  [] Not Met  [] Progressing          Plan   Plan of care Certification: 6/13/2024 to 9/13/2024     Continue to progress per protocol and per patient tolerance    Chino Krishna PT

## 2024-07-18 ENCOUNTER — CLINICAL SUPPORT (OUTPATIENT)
Facility: HOSPITAL | Age: 34
End: 2024-07-18
Payer: MEDICARE

## 2024-07-18 DIAGNOSIS — M25.672 DECREASED RANGE OF MOTION OF LEFT ANKLE: Primary | ICD-10-CM

## 2024-07-18 PROCEDURE — 97112 NEUROMUSCULAR REEDUCATION: CPT | Mod: PN | Performed by: PHYSICAL THERAPIST

## 2024-07-19 NOTE — PROGRESS NOTES
"[facility]   Physical Therapy Treatment Note      Name: Alonzo Rajput  Clinic Number: [id number]    Therapy Diagnosis:   Encounter Diagnosis   Name Primary?    Decreased range of motion of left ankle Yes     Physician: Tigre Burgos  Evaluation Date: 6/13/2024  Authorization Period Expiration: 12/31/24  Plan of Care Expiration: 9/13/2024    Progress Update: 7/13/2024   Visit # / Visits authorized: 9 / 20          FOTO: Visit #1 6/13/2024 - Scored: 1 / 3      Time In: 4:30  Time Out: 5:30  Total Billable Time: 60 minutes Billing reflects 1:1 direct supervision    MD follow-up:    Precautions: Standard    FOTO:  -Intake:   -Status: incomplete  -Discharge: incomplete    Subjective     Pt reports: Ankle is feeling much better. Not painful with any movement but a little tender to the touch.  He was compliant with home exercise program.  Response to previous treatment: Improving symptoms  Functional change: Gradually improving function    Pain: Moderate/10  Location: left ankle     Objective      Objective Measures updated at progress report unless specified otherwise.    Problem list      Treatment     Alonzo received the treatments listed below:    Intervention  Performed   Today  Code   (see below chart)  Notes    Bike  X  TE  10 MIN    Manual therapy X MT DF mobs in supine   Ankle powerband mobs  X  TE BTB 20x ea.     Ankle 3 way  X  NR  3x10 each    Around the worlds  X  NR  3x10 CW, 3x10 CCW   (EO,EC,UP)    3 way calf raises  X  NR 15# DBs   3x10 each    Squat to box  X  NR 18" 3x10    Step Ups  X  NR  12" 3x10    Lateral Step Down  X  NR  6" 3x10    10 minutes of Therapeutic Exercise (TE) to develop strength, endurance, ROM, and flexibility. (69084)   5 minutes of Manual therapy (MT) to improve pain and ROM. (16512)   40 minutes of Neuromuscular Re-Education (NR)  to improve: Balance, Coordination, Kinesthetic, Sense, Proprioception, and Posture. (75094)   00 minutes of " Therapeutic Activities (TA) to improve functional performance. (43627)       Patient Education and Home Exercises     Education provided:   Physical therapy diagnosis, prognosis, and plan of care.     Written Home Exercises Provided: Patient instructed to cont prior HEP.  Exercises were reviewed and Alonzo was able to demonstrate them prior to the end of the session.  Alonzo demonstrated good  understanding of the education provided.     See EMR under Patient Instructions for exercises provided prior visit.    Assessment   Patient's ankle mobility is improving and ankle is near symmetrical to opposite side. Progressing well and may initiate some jogging in the [employer] at next few visits.    Alonzo Is progressing well towards his goals.   Pt prognosis is Good.     Pt will continue to benefit from skilled outpatient physical therapy to address the deficits listed in the problem list box on initial evaluation, provide pt/family education and to maximize pt's level of independence in the home and community environment.     Pt's spiritual, cultural and educational needs considered and pt agreeable to plan of care and goals.    Anticipated barriers to physical therapy: None     Goals:     SHORT TERM GOALS:  4 week Progress Date Met   Recent signs and systems trend is improving in order to progress towards Long term goals.  [] Met  [] Not Met  [] Progressing     Patient will be independent with Home Exercise Program  in order to further progress and return to maximal function. [] Met  [] Not Met  [] Progressing     Pain rating at Worst: 5 /10 in order to progress towards increased independence with activity. [] Met  [] Not Met  [] Progressing     Patient will be able to correct postural deviations in sitting and standing, to decrease pain and promote postural awareness for injury prevention.  [] Met  [] Not Met  [] Progressing     Patient will improve functional outcome (FOTO) score: by 5% to increase self-worth &  perceived functional ability towards long term goals [] Met  [] Not Met  [] Progressing        LONG TERM GOALS: 8 weeks Progress Date Met   Patient will return to normal activites of daily living, recreational, and work related activities with less pain and limitation.  [] Met  [] Not Met  [] Progressing     Patient will improve range of motion  to stated goals in order to return to maximal functional potential.  [] Met  [] Not Met  [] Progressing     Patient will improve Strength to stated goals of appropriate musculature in order to improve functional independence.  [] Met  [] Not Met  [] Progressing     Pain Rating at Best: 1/10 to improve Quality of Life.  [] Met  [] Not Met  [] Progressing     Patient will have met/partially met personal goal of: performing all ADL without pain or assistive device [] Met  [] Not Met  [] Progressing          Plan   Plan of care Certification: 6/13/2024 to 9/13/2024     Continue to progress per protocol and per patient tolerance    Chino Krishna PT

## 2024-07-22 ENCOUNTER — CLINICAL SUPPORT (OUTPATIENT)
Facility: HOSPITAL | Age: 34
End: 2024-07-22
Payer: MEDICARE

## 2024-07-22 DIAGNOSIS — M25.672 DECREASED RANGE OF MOTION OF LEFT ANKLE: Primary | ICD-10-CM

## 2024-07-22 PROCEDURE — 97112 NEUROMUSCULAR REEDUCATION: CPT | Mod: PN | Performed by: PHYSICAL THERAPIST

## 2024-07-22 PROCEDURE — 97530 THERAPEUTIC ACTIVITIES: CPT | Mod: PN | Performed by: PHYSICAL THERAPIST

## 2024-07-22 NOTE — PROGRESS NOTES
"[facility]   Physical Therapy Treatment Note      Name: Alonzo Rajput  Clinic Number: [id number]    Therapy Diagnosis:   Encounter Diagnosis   Name Primary?    Decreased range of motion of left ankle Yes     Physician: Tigre Burgos  Evaluation Date: 6/13/2024  Authorization Period Expiration: 12/31/24  Plan of Care Expiration: 9/13/2024    Progress Update: 7/13/2024   Visit # / Visits authorized: 10 / 20          FOTO: Visit #1 6/13/2024 - Scored: 1 / 3      Time In: 5:00  Time Out: 5:55  Total Billable Time: 55 minutes Billing reflects 1:1 direct supervision    MD follow-up:    Precautions: Standard    FOTO:  -Intake:   -Status: incomplete  -Discharge: incomplete    Subjective     Pt reports: Ankle is feeling much better. Not painful with any movement but a little tender to the touch.  He was compliant with home exercise program.  Response to previous treatment: Improving symptoms  Functional change: Gradually improving function    Pain: Moderate/10  Location: left ankle     Objective      Objective Measures updated at progress report unless specified otherwise.    Problem list      Treatment     Alonzo received the treatments listed below:    Intervention Performed  Today Code  (see below chart) Notes   Bike X TE 10   Weight pass X NR 10x15 sec  15# kb   Box jumps  X NR 12 box 3x8   3 way calf raises X TE 15# DBs  3x15 each   Goblet squats X TE 12" 30#  3x10   Super set:  Step ups  Quick hops X TA  NR    12" box x10  10x   Shuffles X NR 3x20yd laps   Backpedals X NR 2x20yd laps   Calf raises X TA 2x to fatigue   Step ups X TA 12 box, 3x10   10 minutes of Therapeutic Exercise (TE) to develop strength, endurance, ROM, and flexibility. (76507)  00 minutes of Manual therapy (MT) to improve pain and ROM. (66577)  25 minutes of Neuromuscular Re-Education (NR)  to improve: Balance, Coordination, Kinesthetic, Sense, Proprioception, and Posture. (22862)  15 minutes of " Therapeutic Activities (TA) to improve functional performance. (81022)  00 Unattended Electrical Stimulation (ES) for muscle performance and/or pain modulation. (80082)  00 Vasopneumatic Device Therapy () for management of swelling/edema. (09519)  BFR: Blood flow restriction applied during exercise  NP: Not Performed      Patient Education and Home Exercises     Education provided:   Physical therapy diagnosis, prognosis, and plan of care.     Written Home Exercises Provided: Patient instructed to cont prior HEP.  Exercises were reviewed and Alonzo was able to demonstrate them prior to the end of the session.  Alonzo demonstrated good  understanding of the education provided.     See EMR under Patient Instructions for exercises provided prior visit.    Assessment   Patient's ankle mobility is improving and ankle is near symmetrical to opposite side. Progressing well and may initiate some jogging in the [employer] at next few visits.    Alonzo Is progressing well towards his goals.   Pt prognosis is Good.     Pt will continue to benefit from skilled outpatient physical therapy to address the deficits listed in the problem list box on initial evaluation, provide pt/family education and to maximize pt's level of independence in the home and community environment.     Pt's spiritual, cultural and educational needs considered and pt agreeable to plan of care and goals.    Anticipated barriers to physical therapy: None     Goals:     SHORT TERM GOALS:  4 week Progress Date Met   Recent signs and systems trend is improving in order to progress towards Long term goals.  [] Met  [] Not Met  [] Progressing     Patient will be independent with Home Exercise Program  in order to further progress and return to maximal function. [] Met  [] Not Met  [] Progressing     Pain rating at Worst: 5 /10 in order to progress towards increased independence with activity. [] Met  [] Not Met  [] Progressing     Patient will be able to correct  postural deviations in sitting and standing, to decrease pain and promote postural awareness for injury prevention.  [] Met  [] Not Met  [] Progressing     Patient will improve functional outcome (FOTO) score: by 5% to increase self-worth & perceived functional ability towards long term goals [] Met  [] Not Met  [] Progressing        LONG TERM GOALS: 8 weeks Progress Date Met   Patient will return to normal activites of daily living, recreational, and work related activities with less pain and limitation.  [] Met  [] Not Met  [] Progressing     Patient will improve range of motion  to stated goals in order to return to maximal functional potential.  [] Met  [] Not Met  [] Progressing     Patient will improve Strength to stated goals of appropriate musculature in order to improve functional independence.  [] Met  [] Not Met  [] Progressing     Pain Rating at Best: 1/10 to improve Quality of Life.  [] Met  [] Not Met  [] Progressing     Patient will have met/partially met personal goal of: performing all ADL without pain or assistive device [] Met  [] Not Met  [] Progressing          Plan   Plan of care Certification: 6/13/2024 to 9/13/2024     Continue to progress per protocol and per patient tolerance    Chino Krishna PT

## 2024-07-25 ENCOUNTER — PATIENT MESSAGE (OUTPATIENT)
Dept: SPORTS MEDICINE | Facility: CLINIC | Age: 34
End: 2024-07-25
Payer: MEDICARE

## 2024-07-25 ENCOUNTER — PATIENT MESSAGE (OUTPATIENT)
Facility: HOSPITAL | Age: 34
End: 2024-07-25
Payer: MEDICARE

## 2024-08-05 ENCOUNTER — CLINICAL SUPPORT (OUTPATIENT)
Facility: HOSPITAL | Age: 34
End: 2024-08-05
Payer: MEDICARE

## 2024-08-05 DIAGNOSIS — M25.672 DECREASED RANGE OF MOTION OF LEFT ANKLE: Primary | ICD-10-CM

## 2024-08-05 PROCEDURE — 97530 THERAPEUTIC ACTIVITIES: CPT | Mod: PN | Performed by: PHYSICAL THERAPIST

## 2024-08-05 PROCEDURE — 97112 NEUROMUSCULAR REEDUCATION: CPT | Mod: PN | Performed by: PHYSICAL THERAPIST

## 2024-08-08 ENCOUNTER — CLINICAL SUPPORT (OUTPATIENT)
Facility: HOSPITAL | Age: 34
End: 2024-08-08
Payer: MEDICARE

## 2024-08-08 DIAGNOSIS — M25.672 DECREASED RANGE OF MOTION OF LEFT ANKLE: Primary | ICD-10-CM

## 2024-08-08 PROCEDURE — 97110 THERAPEUTIC EXERCISES: CPT | Mod: PN | Performed by: PHYSICAL THERAPIST

## 2024-08-08 PROCEDURE — 97112 NEUROMUSCULAR REEDUCATION: CPT | Mod: PN | Performed by: PHYSICAL THERAPIST

## 2024-08-08 PROCEDURE — 97530 THERAPEUTIC ACTIVITIES: CPT | Mod: PN | Performed by: PHYSICAL THERAPIST

## 2024-08-19 ENCOUNTER — CLINICAL SUPPORT (OUTPATIENT)
Facility: HOSPITAL | Age: 34
End: 2024-08-19
Payer: MEDICARE

## 2024-08-19 DIAGNOSIS — M25.672 DECREASED RANGE OF MOTION OF LEFT ANKLE: Primary | ICD-10-CM

## 2024-08-19 PROCEDURE — 97110 THERAPEUTIC EXERCISES: CPT | Mod: PN | Performed by: PHYSICAL THERAPIST

## 2024-08-19 PROCEDURE — 97112 NEUROMUSCULAR REEDUCATION: CPT | Mod: PN | Performed by: PHYSICAL THERAPIST

## 2024-08-19 PROCEDURE — 97530 THERAPEUTIC ACTIVITIES: CPT | Mod: PN | Performed by: PHYSICAL THERAPIST

## 2024-08-20 NOTE — PROGRESS NOTES
[facility]   Physical Therapy Treatment Note      Name: Alonzo Rajput  Clinic Number: [id number]    Therapy Diagnosis:   Encounter Diagnosis   Name Primary?    Decreased range of motion of left ankle Yes     Physician: Tigre Burgos  Evaluation Date: 6/13/2024  Authorization Period Expiration: 12/31/24  Plan of Care Expiration: 9/13/2024    Progress Update: 7/13/2024   Visit # / Visits authorized: 13 / 20          FOTO: Visit #1 6/13/2024 - Scored: 1 / 3      Time In: 5:00  Time Out: 5:55  Total Billable Time: 55 minutes Billing reflects 1:1 direct supervision    MD follow-up:    Precautions: Standard    FOTO:  -Intake:   -Status: incomplete  -Discharge: incomplete    Subjective     Pt reports: Ankle is feeling much better. Some feelings of instability when jogging or jumping.  He was compliant with home exercise program.  Response to previous treatment: Improving symptoms  Functional change: Gradually improving function    Pain: Moderate/10  Location: left ankle     Objective      Objective Measures updated at progress report unless specified otherwise.    Problem list      Treatment     Alonzo received the treatments listed below:    Intervention  Performed   Today  Code   (see below chart)  Notes    DF mobs  X  TE  Orange TB    JOSE LUIS 11+  X  TE  Top 6    Hops:   DL pogo hops   Jumping elizabeth hops   Running man hops  X  NR  3 sets x 10s ea.    Weight pass  X  NR  5x15 sec  20# kb    Around the worlds  X  NR  3x10 20# CW/CCW   (EC,EO,UP)    Shuffles  X  NR  2x15yd laps    Backpedals  X  NR  2x15yd laps    Calf raises  X  TA  2x to fatigue    Step ups  X  TA  18 box, 3x10    15 minutes of Therapeutic Exercise (TE) to develop strength, endurance, ROM, and flexibility. (98391)   00 minutes of Manual therapy (MT) to improve pain and ROM. (97139)   25 minutes of Neuromuscular Re-Education (NR)  to improve: Balance, Coordination, Kinesthetic, Sense, Proprioception, and  Posture. (15143)   15 minutes of Therapeutic Activities (TA) to improve functional performance. (76108)   00 Unattended Electrical Stimulation (ES) for muscle performance and/or pain modulation. (14817)   00 Vasopneumatic Device Therapy () for management of swelling/edema. (96952)   BFR: Blood flow restriction applied during exercise   NP: Not Performed       Patient Education and Home Exercises     Education provided:   Physical therapy diagnosis, prognosis, and plan of care.     Written Home Exercises Provided: Patient instructed to cont prior HEP.  Exercises were reviewed and Alonzo was able to demonstrate them prior to the end of the session.  Alonzo demonstrated good  understanding of the education provided.     See EMR under Patient Instructions for exercises provided prior visit.    Assessment   Focusing on regaining good ankle stability with jogging and jumping tasks.     Alonzo Is progressing well towards his goals.   Pt prognosis is Good.     Pt will continue to benefit from skilled outpatient physical therapy to address the deficits listed in the problem list box on initial evaluation, provide pt/family education and to maximize pt's level of independence in the home and community environment.     Pt's spiritual, cultural and educational needs considered and pt agreeable to plan of care and goals.    Anticipated barriers to physical therapy: None     Goals:     SHORT TERM GOALS:  4 week Progress Date Met   Recent signs and systems trend is improving in order to progress towards Long term goals.  [] Met  [] Not Met  [] Progressing     Patient will be independent with Home Exercise Program  in order to further progress and return to maximal function. [] Met  [] Not Met  [] Progressing     Pain rating at Worst: 5 /10 in order to progress towards increased independence with activity. [] Met  [] Not Met  [] Progressing     Patient will be able to correct postural deviations in sitting and standing, to  decrease pain and promote postural awareness for injury prevention.  [] Met  [] Not Met  [] Progressing     Patient will improve functional outcome (FOTO) score: by 5% to increase self-worth & perceived functional ability towards long term goals [] Met  [] Not Met  [] Progressing        LONG TERM GOALS: 8 weeks Progress Date Met   Patient will return to normal activites of daily living, recreational, and work related activities with less pain and limitation.  [] Met  [] Not Met  [] Progressing     Patient will improve range of motion  to stated goals in order to return to maximal functional potential.  [] Met  [] Not Met  [] Progressing     Patient will improve Strength to stated goals of appropriate musculature in order to improve functional independence.  [] Met  [] Not Met  [] Progressing     Pain Rating at Best: 1/10 to improve Quality of Life.  [] Met  [] Not Met  [] Progressing     Patient will have met/partially met personal goal of: performing all ADL without pain or assistive device [] Met  [] Not Met  [] Progressing          Plan   Plan of care Certification: 6/13/2024 to 9/13/2024     Continue to progress per protocol and per patient tolerance    Chino Krishna PT

## 2024-08-26 ENCOUNTER — CLINICAL SUPPORT (OUTPATIENT)
Facility: HOSPITAL | Age: 34
End: 2024-08-26
Payer: MEDICARE

## 2024-08-26 DIAGNOSIS — M25.672 DECREASED RANGE OF MOTION OF LEFT ANKLE: Primary | ICD-10-CM

## 2024-08-26 PROCEDURE — 97112 NEUROMUSCULAR REEDUCATION: CPT | Mod: PN | Performed by: PHYSICAL THERAPIST

## 2024-08-26 PROCEDURE — 97530 THERAPEUTIC ACTIVITIES: CPT | Mod: PN | Performed by: PHYSICAL THERAPIST

## 2024-08-26 NOTE — PROGRESS NOTES
[facility]   Physical Therapy Treatment Note      Name: Alonzo Rajput  Clinic Number: [id number]    Therapy Diagnosis:   Encounter Diagnosis   Name Primary?    Decreased range of motion of left ankle Yes     Physician: Tigre Burgos  Evaluation Date: 6/13/2024  Authorization Period Expiration: 12/31/24  Plan of Care Expiration: 9/13/2024    Progress Update: 7/13/2024   Visit # / Visits authorized: 14 / 20          FOTO: Visit #1 6/13/2024 - Scored: 1 / 3      Time In: 5:00  Time Out: 5:55  Total Billable Time: 55 minutes Billing reflects 1:1 direct supervision    MD follow-up:    Precautions: Standard    FOTO:  -Intake:   -Status: incomplete  -Discharge: incomplete    Subjective     Pt reports: Ankle is feeling much better. Some feelings of instability when jogging or jumping.  He was compliant with home exercise program.  Response to previous treatment: Improving symptoms  Functional change: Gradually improving function    Pain: Moderate/10  Location: left ankle     Objective      Objective Measures updated at progress report unless specified otherwise.    Problem list      Treatment     Alonzo received the treatments listed below:    Intervention  Performed   Today  Code   (see below chart)  Notes    DF mobs  X  TE  Orange TB    JOSE LUIS 11+  X  TE  Top 6    Hops:   DL pogo hops   Jumping elizabeth hops   Running man hops  X  NR  3 sets x 10s ea.    Weight pass  X  NR  5x15 sec  20# kb    Around the worlds  X  NR  3x10 20# CW/CCW   (EC,EO,UP)    Shuffles  X  NR  2x15yd laps    Backpedals  X  NR  2x15yd laps    Calf raises  X  TA  2x to fatigue    Step ups  X  TA  18 box, 3x10    15 minutes of Therapeutic Exercise (TE) to develop strength, endurance, ROM, and flexibility. (52997)   00 minutes of Manual therapy (MT) to improve pain and ROM. (42669)   25 minutes of Neuromuscular Re-Education (NR)  to improve: Balance, Coordination, Kinesthetic, Sense, Proprioception, and  Posture. (20838)   15 minutes of Therapeutic Activities (TA) to improve functional performance. (61073)   00 Unattended Electrical Stimulation (ES) for muscle performance and/or pain modulation. (09113)   00 Vasopneumatic Device Therapy () for management of swelling/edema. (87501)   BFR: Blood flow restriction applied during exercise   NP: Not Performed       Patient Education and Home Exercises     Education provided:   Physical therapy diagnosis, prognosis, and plan of care.     Written Home Exercises Provided: Patient instructed to cont prior HEP.  Exercises were reviewed and Alonzo was able to demonstrate them prior to the end of the session.  Alonzo demonstrated good  understanding of the education provided.     See EMR under Patient Instructions for exercises provided prior visit.    Assessment   Focusing on regaining good ankle stability with jogging and jumping tasks.     Alonzo Is progressing well towards his goals.   Pt prognosis is Good.     Pt will continue to benefit from skilled outpatient physical therapy to address the deficits listed in the problem list box on initial evaluation, provide pt/family education and to maximize pt's level of independence in the home and community environment.     Pt's spiritual, cultural and educational needs considered and pt agreeable to plan of care and goals.    Anticipated barriers to physical therapy: None     Goals:     SHORT TERM GOALS:  4 week Progress Date Met   Recent signs and systems trend is improving in order to progress towards Long term goals.  [] Met  [] Not Met  [] Progressing     Patient will be independent with Home Exercise Program  in order to further progress and return to maximal function. [] Met  [] Not Met  [] Progressing     Pain rating at Worst: 5 /10 in order to progress towards increased independence with activity. [] Met  [] Not Met  [] Progressing     Patient will be able to correct postural deviations in sitting and standing, to  decrease pain and promote postural awareness for injury prevention.  [] Met  [] Not Met  [] Progressing     Patient will improve functional outcome (FOTO) score: by 5% to increase self-worth & perceived functional ability towards long term goals [] Met  [] Not Met  [] Progressing        LONG TERM GOALS: 8 weeks Progress Date Met   Patient will return to normal activites of daily living, recreational, and work related activities with less pain and limitation.  [] Met  [] Not Met  [] Progressing     Patient will improve range of motion  to stated goals in order to return to maximal functional potential.  [] Met  [] Not Met  [] Progressing     Patient will improve Strength to stated goals of appropriate musculature in order to improve functional independence.  [] Met  [] Not Met  [] Progressing     Pain Rating at Best: 1/10 to improve Quality of Life.  [] Met  [] Not Met  [] Progressing     Patient will have met/partially met personal goal of: performing all ADL without pain or assistive device [] Met  [] Not Met  [] Progressing          Plan   Plan of care Certification: 6/13/2024 to 9/13/2024     Continue to progress per protocol and per patient tolerance    Chino Krishna PT

## 2024-08-27 ENCOUNTER — TELEPHONE (OUTPATIENT)
Dept: INFECTIOUS DISEASES | Facility: CLINIC | Age: 34
End: 2024-08-27
Payer: MEDICARE

## 2024-08-27 ENCOUNTER — LAB VISIT (OUTPATIENT)
Dept: LAB | Facility: HOSPITAL | Age: 34
End: 2024-08-27
Attending: INTERNAL MEDICINE
Payer: MEDICARE

## 2024-08-27 DIAGNOSIS — B20 CURRENTLY ASYMPTOMATIC HIV INFECTION, WITH HISTORY OF HIV-RELATED ILLNESS: ICD-10-CM

## 2024-08-27 LAB
ALBUMIN SERPL BCP-MCNC: 3.7 G/DL (ref 3.5–5.2)
ALP SERPL-CCNC: 86 U/L (ref 55–135)
ALT SERPL W/O P-5'-P-CCNC: 49 U/L (ref 10–44)
ANION GAP SERPL CALC-SCNC: 10 MMOL/L (ref 8–16)
AST SERPL-CCNC: 43 U/L (ref 10–40)
BASOPHILS # BLD AUTO: 0.03 K/UL (ref 0–0.2)
BASOPHILS NFR BLD: 0.6 % (ref 0–1.9)
BILIRUB SERPL-MCNC: 0.4 MG/DL (ref 0.1–1)
BUN SERPL-MCNC: 13 MG/DL (ref 6–20)
CALCIUM SERPL-MCNC: 8.9 MG/DL (ref 8.7–10.5)
CHLORIDE SERPL-SCNC: 105 MMOL/L (ref 95–110)
CO2 SERPL-SCNC: 22 MMOL/L (ref 23–29)
CREAT SERPL-MCNC: 1.5 MG/DL (ref 0.5–1.4)
DIFFERENTIAL METHOD BLD: ABNORMAL
EOSINOPHIL # BLD AUTO: 0.2 K/UL (ref 0–0.5)
EOSINOPHIL NFR BLD: 3.7 % (ref 0–8)
ERYTHROCYTE [DISTWIDTH] IN BLOOD BY AUTOMATED COUNT: 13.2 % (ref 11.5–14.5)
EST. GFR  (NO RACE VARIABLE): >60 ML/MIN/1.73 M^2
GLUCOSE SERPL-MCNC: 101 MG/DL (ref 70–110)
HCT VFR BLD AUTO: 42.6 % (ref 40–54)
HGB BLD-MCNC: 14.3 G/DL (ref 14–18)
IMM GRANULOCYTES # BLD AUTO: 0.01 K/UL (ref 0–0.04)
IMM GRANULOCYTES NFR BLD AUTO: 0.2 % (ref 0–0.5)
LYMPHOCYTES # BLD AUTO: 2.8 K/UL (ref 1–4.8)
LYMPHOCYTES NFR BLD: 54.3 % (ref 18–48)
MCH RBC QN AUTO: 31.2 PG (ref 27–31)
MCHC RBC AUTO-ENTMCNC: 33.6 G/DL (ref 32–36)
MCV RBC AUTO: 93 FL (ref 82–98)
MONOCYTES # BLD AUTO: 0.6 K/UL (ref 0.3–1)
MONOCYTES NFR BLD: 11.9 % (ref 4–15)
NEUTROPHILS # BLD AUTO: 1.5 K/UL (ref 1.8–7.7)
NEUTROPHILS NFR BLD: 29.3 % (ref 38–73)
NRBC BLD-RTO: 0 /100 WBC
PLATELET # BLD AUTO: 286 K/UL (ref 150–450)
PMV BLD AUTO: 10.4 FL (ref 9.2–12.9)
POTASSIUM SERPL-SCNC: 3.7 MMOL/L (ref 3.5–5.1)
PROT SERPL-MCNC: 7.4 G/DL (ref 6–8.4)
RBC # BLD AUTO: 4.58 M/UL (ref 4.6–6.2)
SODIUM SERPL-SCNC: 137 MMOL/L (ref 136–145)
WBC # BLD AUTO: 5.14 K/UL (ref 3.9–12.7)

## 2024-08-27 PROCEDURE — 80053 COMPREHEN METABOLIC PANEL: CPT | Performed by: INTERNAL MEDICINE

## 2024-08-27 PROCEDURE — 36415 COLL VENOUS BLD VENIPUNCTURE: CPT | Performed by: INTERNAL MEDICINE

## 2024-08-27 PROCEDURE — 86361 T CELL ABSOLUTE COUNT: CPT | Performed by: INTERNAL MEDICINE

## 2024-08-27 PROCEDURE — 85025 COMPLETE CBC W/AUTO DIFF WBC: CPT | Performed by: INTERNAL MEDICINE

## 2024-08-27 PROCEDURE — 87536 HIV-1 QUANT&REVRSE TRNSCRPJ: CPT | Performed by: INTERNAL MEDICINE

## 2024-08-28 ENCOUNTER — OFFICE VISIT (OUTPATIENT)
Dept: INFECTIOUS DISEASES | Facility: CLINIC | Age: 34
End: 2024-08-28
Payer: MEDICARE

## 2024-08-28 DIAGNOSIS — N17.9 AKI (ACUTE KIDNEY INJURY): ICD-10-CM

## 2024-08-28 DIAGNOSIS — C83.38 DIFFUSE LARGE B-CELL LYMPHOMA OF LYMPH NODES OF MULTIPLE REGIONS: ICD-10-CM

## 2024-08-28 DIAGNOSIS — B20 CURRENTLY ASYMPTOMATIC HIV INFECTION, WITH HISTORY OF HIV-RELATED ILLNESS: Primary | ICD-10-CM

## 2024-08-28 LAB
CD3+CD4+ CELLS # BLD: 647 CELLS/UL (ref 300–1400)
CD3+CD4+ CELLS NFR BLD: 22 % (ref 28–57)
HIV1 RNA # SERPL NAA+PROBE: <20 COPIES/ML
HIV1 RNA SERPL NAA+PROBE-LOG#: <1.3 LOG COPIES/ML
HIV1 RNA SERPL QL NAA+PROBE: DETECTED

## 2024-08-28 PROCEDURE — 99215 OFFICE O/P EST HI 40 MIN: CPT | Mod: S$PBB,,, | Performed by: INTERNAL MEDICINE

## 2024-08-29 ENCOUNTER — CLINICAL SUPPORT (OUTPATIENT)
Facility: HOSPITAL | Age: 34
End: 2024-08-29
Payer: MEDICARE

## 2024-08-29 DIAGNOSIS — M25.672 DECREASED RANGE OF MOTION OF LEFT ANKLE: Primary | ICD-10-CM

## 2024-08-29 PROCEDURE — 97112 NEUROMUSCULAR REEDUCATION: CPT | Mod: PN | Performed by: PHYSICAL THERAPIST

## 2024-08-29 PROCEDURE — 97110 THERAPEUTIC EXERCISES: CPT | Mod: PN | Performed by: PHYSICAL THERAPIST

## 2024-08-29 PROCEDURE — 97530 THERAPEUTIC ACTIVITIES: CPT | Mod: PN | Performed by: PHYSICAL THERAPIST

## 2024-08-30 NOTE — PROGRESS NOTES
[facility]   Physical Therapy Treatment Note      Name: Alonzo Rajput  Clinic Number: [id number]    Therapy Diagnosis:   Encounter Diagnosis   Name Primary?    Decreased range of motion of left ankle Yes     Physician: Tigre Burgos  Evaluation Date: 6/13/2024  Authorization Period Expiration: 12/31/24  Plan of Care Expiration: 9/13/2024    Progress Update: 7/13/2024   Visit # / Visits authorized: 15 / 30          FOTO: Visit #1 6/13/2024 - Scored: 1 / 3      Time In: 4:30 pm  Time Out: 5:30 pm  Total Billable Time: 55 minutes Billing reflects 1:1 direct supervision    MD follow-up:    Precautions: Standard    FOTO:  -Intake:   -Status: incomplete  -Discharge: incomplete    Subjective     Pt reports: Ankle is feeling much better. Some feelings of instability when jogging or jumping.  He was compliant with home exercise program.  Response to previous treatment: Improving symptoms  Functional change: Gradually improving function    Pain: Moderate/10  Location: left ankle     Objective      Objective Measures updated at progress report unless specified otherwise.    Problem list      Treatment     Alonzo received the treatments listed below:    Intervention  Performed   Today  Code   (see below chart)  Notes    DF mobs  X  TE  Orange TB    JOSE LUIS 11+  X  TE  Top 6    Hops:   DL pogo hops   Jumping elizabeth hops   Running man hops  X  NR  3 sets x 10s ea.    Weight pass  X  NR  5x15 sec  20# kb    Around the worlds  X  NR  3x10 20# CW/CCW   (EC,EO,UP)    Shuffles  X  NR  2x15yd laps    Backpedals  X  NR  2x15yd laps    Calf raises  X  TA  2x to fatigue    Step ups  X  TA  18 box, 3x10    15 minutes of Therapeutic Exercise (TE) to develop strength, endurance, ROM, and flexibility. (50307)   00 minutes of Manual therapy (MT) to improve pain and ROM. (32875)   25 minutes of Neuromuscular Re-Education (NR)  to improve: Balance, Coordination, Kinesthetic, Sense, Proprioception, and  Posture. (85883)   15 minutes of Therapeutic Activities (TA) to improve functional performance. (40555)   00 Unattended Electrical Stimulation (ES) for muscle performance and/or pain modulation. (20681)   00 Vasopneumatic Device Therapy () for management of swelling/edema. (22122)   BFR: Blood flow restriction applied during exercise   NP: Not Performed       Patient Education and Home Exercises     Education provided:   Physical therapy diagnosis, prognosis, and plan of care.     Written Home Exercises Provided: Patient instructed to cont prior HEP.  Exercises were reviewed and Alonzo was able to demonstrate them prior to the end of the session.  Alonzo demonstrated good  understanding of the education provided.     See EMR under Patient Instructions for exercises provided prior visit.    Assessment   Focusing on regaining good ankle stability with jogging and jumping tasks.     Alonzo Is progressing well towards his goals.   Pt prognosis is Good.     Pt will continue to benefit from skilled outpatient physical therapy to address the deficits listed in the problem list box on initial evaluation, provide pt/family education and to maximize pt's level of independence in the home and community environment.     Pt's spiritual, cultural and educational needs considered and pt agreeable to plan of care and goals.    Anticipated barriers to physical therapy: None     Goals:     SHORT TERM GOALS:  4 week Progress Date Met   Recent signs and systems trend is improving in order to progress towards Long term goals.  [] Met  [] Not Met  [] Progressing     Patient will be independent with Home Exercise Program  in order to further progress and return to maximal function. [] Met  [] Not Met  [] Progressing     Pain rating at Worst: 5 /10 in order to progress towards increased independence with activity. [] Met  [] Not Met  [] Progressing     Patient will be able to correct postural deviations in sitting and standing, to  decrease pain and promote postural awareness for injury prevention.  [] Met  [] Not Met  [] Progressing     Patient will improve functional outcome (FOTO) score: by 5% to increase self-worth & perceived functional ability towards long term goals [] Met  [] Not Met  [] Progressing        LONG TERM GOALS: 8 weeks Progress Date Met   Patient will return to normal activites of daily living, recreational, and work related activities with less pain and limitation.  [] Met  [] Not Met  [] Progressing     Patient will improve range of motion  to stated goals in order to return to maximal functional potential.  [] Met  [] Not Met  [] Progressing     Patient will improve Strength to stated goals of appropriate musculature in order to improve functional independence.  [] Met  [] Not Met  [] Progressing     Pain Rating at Best: 1/10 to improve Quality of Life.  [] Met  [] Not Met  [] Progressing     Patient will have met/partially met personal goal of: performing all ADL without pain or assistive device [] Met  [] Not Met  [] Progressing          Plan   Plan of care Certification: 6/13/2024 to 9/13/2024     Continue to progress per protocol and per patient tolerance    Chino Krishna PT

## 2024-09-04 ENCOUNTER — LAB VISIT (OUTPATIENT)
Dept: LAB | Facility: HOSPITAL | Age: 34
End: 2024-09-04
Attending: INTERNAL MEDICINE
Payer: MEDICARE

## 2024-09-04 DIAGNOSIS — B20 CURRENTLY ASYMPTOMATIC HIV INFECTION, WITH HISTORY OF HIV-RELATED ILLNESS: ICD-10-CM

## 2024-09-04 DIAGNOSIS — C83.38 DIFFUSE LARGE B-CELL LYMPHOMA OF LYMPH NODES OF MULTIPLE REGIONS: ICD-10-CM

## 2024-09-04 DIAGNOSIS — N17.9 AKI (ACUTE KIDNEY INJURY): ICD-10-CM

## 2024-09-04 LAB
ANION GAP SERPL CALC-SCNC: 10 MMOL/L (ref 8–16)
BUN SERPL-MCNC: 14 MG/DL (ref 6–20)
CALCIUM SERPL-MCNC: 9.8 MG/DL (ref 8.7–10.5)
CHLORIDE SERPL-SCNC: 104 MMOL/L (ref 95–110)
CO2 SERPL-SCNC: 24 MMOL/L (ref 23–29)
CREAT SERPL-MCNC: 1.5 MG/DL (ref 0.5–1.4)
EST. GFR  (NO RACE VARIABLE): >60 ML/MIN/1.73 M^2
GLUCOSE SERPL-MCNC: 98 MG/DL (ref 70–110)
POTASSIUM SERPL-SCNC: 3.7 MMOL/L (ref 3.5–5.1)
SODIUM SERPL-SCNC: 138 MMOL/L (ref 136–145)

## 2024-09-04 PROCEDURE — 36415 COLL VENOUS BLD VENIPUNCTURE: CPT | Performed by: INTERNAL MEDICINE

## 2024-09-04 PROCEDURE — 80048 BASIC METABOLIC PNL TOTAL CA: CPT | Performed by: INTERNAL MEDICINE

## 2024-09-16 ENCOUNTER — CLINICAL SUPPORT (OUTPATIENT)
Facility: HOSPITAL | Age: 34
End: 2024-09-16
Payer: MEDICARE

## 2024-09-16 DIAGNOSIS — M25.672 DECREASED RANGE OF MOTION OF LEFT ANKLE: Primary | ICD-10-CM

## 2024-09-16 PROCEDURE — 97112 NEUROMUSCULAR REEDUCATION: CPT | Mod: PN | Performed by: PHYSICAL THERAPIST

## 2024-09-16 PROCEDURE — 97110 THERAPEUTIC EXERCISES: CPT | Mod: PN | Performed by: PHYSICAL THERAPIST

## 2024-09-16 NOTE — PROGRESS NOTES
[facility]   Physical Therapy Treatment Note      Name: Alonzo Rajput  Clinic Number: [id number]    Therapy Diagnosis:   Encounter Diagnosis   Name Primary?    Decreased range of motion of left ankle Yes     Physician: Tigre Burgos  Evaluation Date: 6/13/2024  Authorization Period Expiration: 12/31/24  Plan of Care Expiration: 9/13/2024    Progress Update: 7/13/2024   Visit # / Visits authorized: 16 / 30          FOTO: Visit #1 6/13/2024 - Scored: 1 / 3      Time In: 4:30 pm  Time Out: 5:30 pm  Total Billable Time: 55 minutes Billing reflects 1:1 direct supervision    MD follow-up:    Precautions: Standard    FOTO:  -Intake:   -Status: incomplete  -Discharge: incomplete    Subjective     Pt reports: Ankle is feeling much better. Some feelings of instability when jogging or jumping.  He was compliant with home exercise program.  Response to previous treatment: Improving symptoms  Functional change: Gradually improving function    Pain: Moderate/10  Location: left ankle     Objective      Objective Measures updated at progress report unless specified otherwise.    Problem list      Treatment     Alonzo received the treatments listed below:    Intervention Performed  Today Code  (see below chart) Notes   Elliptical X TE 5'   Switched On:  4 cone turn & sprint  Visual perception & quick feet X NR 3x30s ea.   Lateral band walks X NR Red cuff, 3 x 10yd laps   Calf raises X TE 20#KBs, 4x10   Tib raises X TE 3x10   25 minutes of Therapeutic Exercise (TE) to develop strength, endurance, ROM, and flexibility. (78944)  00 minutes of Manual therapy (MT) to improve pain and ROM. (31130)  25 minutes of Neuromuscular Re-Education (NR)  to improve: Balance, Coordination, Kinesthetic, Sense, Proprioception, and Posture. (32008)  00 minutes of Therapeutic Activities (TA) to improve functional performance. (43555)  00 Unattended Electrical Stimulation (ES) for muscle performance and/or  pain modulation. (74304)  00 Vasopneumatic Device Therapy () for management of swelling/edema. (05906)  BFR: Blood flow restriction applied during exercise  NP: Not Performed      Patient Education and Home Exercises     Education provided:   Physical therapy diagnosis, prognosis, and plan of care.     Written Home Exercises Provided: Patient instructed to cont prior HEP.  Exercises were reviewed and Alonzo was able to demonstrate them prior to the end of the session.  Alonzo demonstrated good  understanding of the education provided.     See EMR under Patient Instructions for exercises provided prior visit.    Assessment   Focusing on regaining good ankle stability with jogging and jumping tasks.     Alonzo Is progressing well towards his goals.   Pt prognosis is Good.     Pt will continue to benefit from skilled outpatient physical therapy to address the deficits listed in the problem list box on initial evaluation, provide pt/family education and to maximize pt's level of independence in the home and community environment.     Pt's spiritual, cultural and educational needs considered and pt agreeable to plan of care and goals.    Anticipated barriers to physical therapy: None     Goals:     SHORT TERM GOALS:  4 week Progress Date Met   Recent signs and systems trend is improving in order to progress towards Long term goals.  [] Met  [] Not Met  [] Progressing     Patient will be independent with Home Exercise Program  in order to further progress and return to maximal function. [] Met  [] Not Met  [] Progressing     Pain rating at Worst: 5 /10 in order to progress towards increased independence with activity. [] Met  [] Not Met  [] Progressing     Patient will be able to correct postural deviations in sitting and standing, to decrease pain and promote postural awareness for injury prevention.  [] Met  [] Not Met  [] Progressing     Patient will improve functional outcome (FOTO) score: by 5% to increase  self-worth & perceived functional ability towards long term goals [] Met  [] Not Met  [] Progressing        LONG TERM GOALS: 8 weeks Progress Date Met   Patient will return to normal activites of daily living, recreational, and work related activities with less pain and limitation.  [] Met  [] Not Met  [] Progressing     Patient will improve range of motion  to stated goals in order to return to maximal functional potential.  [] Met  [] Not Met  [] Progressing     Patient will improve Strength to stated goals of appropriate musculature in order to improve functional independence.  [] Met  [] Not Met  [] Progressing     Pain Rating at Best: 1/10 to improve Quality of Life.  [] Met  [] Not Met  [] Progressing     Patient will have met/partially met personal goal of: performing all ADL without pain or assistive device [] Met  [] Not Met  [] Progressing          Plan   Plan of care Certification: 6/13/2024 to 9/13/2024     Continue to progress per protocol and per patient tolerance    Chino Krishna, PT

## 2024-09-23 ENCOUNTER — CLINICAL SUPPORT (OUTPATIENT)
Facility: HOSPITAL | Age: 34
End: 2024-09-23
Payer: MEDICARE

## 2024-09-23 DIAGNOSIS — M25.672 DECREASED RANGE OF MOTION OF LEFT ANKLE: Primary | ICD-10-CM

## 2024-09-23 PROCEDURE — 97110 THERAPEUTIC EXERCISES: CPT | Mod: PN | Performed by: PHYSICAL THERAPIST

## 2024-09-23 PROCEDURE — 97112 NEUROMUSCULAR REEDUCATION: CPT | Mod: PN | Performed by: PHYSICAL THERAPIST

## 2024-09-23 NOTE — PROGRESS NOTES
[facility]   Physical Therapy Treatment Note      Name: Alonzo Rajput  Clinic Number: [id number]    Therapy Diagnosis:   Encounter Diagnosis   Name Primary?    Decreased range of motion of left ankle Yes     Physician: Tigre Burgos  Evaluation Date: 6/13/2024  Authorization Period Expiration: 12/31/24  Plan of Care Expiration: 9/13/2024    Progress Update: 7/13/2024   Visit # / Visits authorized: 17 / 30          FOTO: Visit #1 6/13/2024 - Scored: 1 / 3      Time In: 4:40 pm  Time Out: 5:35 pm  Total Billable Time: 55 minutes Billing reflects 1:1 direct supervision    MD follow-up:    Precautions: Standard    FOTO:  -Intake:   -Status: incomplete  -Discharge: incomplete    Subjective     Pt reports: Ankle is feeling much better. Some feelings of instability when jogging or jumping.  He was compliant with home exercise program.  Response to previous treatment: Improving symptoms  Functional change: Gradually improving function    Pain: Moderate/10  Location: left ankle     Objective      Objective Measures updated at progress report unless specified otherwise.    Problem list      Treatment     Alonzo received the treatments listed below:    Intervention Performed  Today Code  (see below chart) Notes   Elliptical X TE 5'   Switched On:  4 cone turn & sprint  Visual perception & quick feet X NR 3x30s ea.   Lateral band walks X NR Red cuff, 3 x 10yd laps   Calf raises X TE 20#KBs, 4x10   Tib raises X TE 3x10   25 minutes of Therapeutic Exercise (TE) to develop strength, endurance, ROM, and flexibility. (52026)  00 minutes of Manual therapy (MT) to improve pain and ROM. (79814)  25 minutes of Neuromuscular Re-Education (NR)  to improve: Balance, Coordination, Kinesthetic, Sense, Proprioception, and Posture. (00690)  00 minutes of Therapeutic Activities (TA) to improve functional performance. (96552)  00 Unattended Electrical Stimulation (ES) for muscle performance and/or  pain modulation. (82049)  00 Vasopneumatic Device Therapy () for management of swelling/edema. (40089)  BFR: Blood flow restriction applied during exercise  NP: Not Performed      Patient Education and Home Exercises     Education provided:   Physical therapy diagnosis, prognosis, and plan of care.     Written Home Exercises Provided: Patient instructed to cont prior HEP.  Exercises were reviewed and Alonzo was able to demonstrate them prior to the end of the session.  Alonzo demonstrated good  understanding of the education provided.     See EMR under Patient Instructions for exercises provided prior visit.    Assessment   Focusing on regaining good ankle stability with jogging and jumping tasks.     Alonzo Is progressing well towards his goals.   Pt prognosis is Good.     Pt will continue to benefit from skilled outpatient physical therapy to address the deficits listed in the problem list box on initial evaluation, provide pt/family education and to maximize pt's level of independence in the home and community environment.     Pt's spiritual, cultural and educational needs considered and pt agreeable to plan of care and goals.    Anticipated barriers to physical therapy: None     Goals:     SHORT TERM GOALS:  4 week Progress Date Met   Recent signs and systems trend is improving in order to progress towards Long term goals.  [] Met  [] Not Met  [] Progressing     Patient will be independent with Home Exercise Program  in order to further progress and return to maximal function. [] Met  [] Not Met  [] Progressing     Pain rating at Worst: 5 /10 in order to progress towards increased independence with activity. [] Met  [] Not Met  [] Progressing     Patient will be able to correct postural deviations in sitting and standing, to decrease pain and promote postural awareness for injury prevention.  [] Met  [] Not Met  [] Progressing     Patient will improve functional outcome (FOTO) score: by 5% to increase  self-worth & perceived functional ability towards long term goals [] Met  [] Not Met  [] Progressing        LONG TERM GOALS: 8 weeks Progress Date Met   Patient will return to normal activites of daily living, recreational, and work related activities with less pain and limitation.  [] Met  [] Not Met  [] Progressing     Patient will improve range of motion  to stated goals in order to return to maximal functional potential.  [] Met  [] Not Met  [] Progressing     Patient will improve Strength to stated goals of appropriate musculature in order to improve functional independence.  [] Met  [] Not Met  [] Progressing     Pain Rating at Best: 1/10 to improve Quality of Life.  [] Met  [] Not Met  [] Progressing     Patient will have met/partially met personal goal of: performing all ADL without pain or assistive device [] Met  [] Not Met  [] Progressing          Plan   Plan of care Certification: 6/13/2024 to 9/13/2024     Continue to progress per protocol and per patient tolerance    Chino Krishna, PT

## 2024-09-26 ENCOUNTER — CLINICAL SUPPORT (OUTPATIENT)
Facility: HOSPITAL | Age: 34
End: 2024-09-26
Payer: MEDICARE

## 2024-09-26 DIAGNOSIS — M25.672 DECREASED RANGE OF MOTION OF LEFT ANKLE: Primary | ICD-10-CM

## 2024-09-26 PROCEDURE — 97112 NEUROMUSCULAR REEDUCATION: CPT | Mod: PN | Performed by: PHYSICAL THERAPIST

## 2024-09-26 PROCEDURE — 97110 THERAPEUTIC EXERCISES: CPT | Mod: PN | Performed by: PHYSICAL THERAPIST

## 2024-09-27 NOTE — PROGRESS NOTES
[facility]   Physical Therapy Treatment Note      Name: Alonzo Rajput  Clinic Number: [id number]    Therapy Diagnosis:   Encounter Diagnosis   Name Primary?    Decreased range of motion of left ankle Yes     Physician: Tigre Burgos  Evaluation Date: 6/13/2024  Authorization Period Expiration: 12/31/24  Plan of Care Expiration: 9/13/2024    Progress Update: 7/13/2024   Visit # / Visits authorized: 17 / 30          FOTO: Visit #1 6/13/2024 - Scored: 1 / 3      Time In: 4:40 pm  Time Out: 5:35 pm  Total Billable Time: 55 minutes Billing reflects 1:1 direct supervision    MD follow-up:    Precautions: Standard    FOTO:  -Intake:   -Status: incomplete  -Discharge: incomplete    Subjective     Pt reports: Ankle is feeling much better. Some feelings of instability when jogging or jumping.  He was compliant with home exercise program.  Response to previous treatment: Improving symptoms  Functional change: Gradually improving function    Pain: Moderate/10  Location: left ankle     Objective      Objective Measures updated at progress report unless specified otherwise.    Problem list      Treatment     Alonzo received the treatments listed below:    Intervention Performed  Today Code  (see below chart) Notes   Elliptical X TE 5'   Switched On:  4 cone turn & sprint  Visual perception & quick feet X NR 3x30s ea.   Lateral band walks X NR Red cuff, 3 x 10yd laps   Calf raises X TE 20#KBs, 4x10   Tib raises X TE 3x10   25 minutes of Therapeutic Exercise (TE) to develop strength, endurance, ROM, and flexibility. (06702)  00 minutes of Manual therapy (MT) to improve pain and ROM. (88543)  25 minutes of Neuromuscular Re-Education (NR)  to improve: Balance, Coordination, Kinesthetic, Sense, Proprioception, and Posture. (99416)  00 minutes of Therapeutic Activities (TA) to improve functional performance. (84023)  00 Unattended Electrical Stimulation (ES) for muscle performance and/or  pain modulation. (17530)  00 Vasopneumatic Device Therapy () for management of swelling/edema. (86621)  BFR: Blood flow restriction applied during exercise  NP: Not Performed      Patient Education and Home Exercises     Education provided:   Physical therapy diagnosis, prognosis, and plan of care.     Written Home Exercises Provided: Patient instructed to cont prior HEP.  Exercises were reviewed and Alonzo was able to demonstrate them prior to the end of the session.  Alonzo demonstrated good  understanding of the education provided.     See EMR under Patient Instructions for exercises provided prior visit.    Assessment   Had some discomfort in the lateral ankle with agility drills today once he became more fatigued. Was able to perform strengthening afterwards without any pain.     Alonzo Is progressing well towards his goals.   Pt prognosis is Good.     Pt will continue to benefit from skilled outpatient physical therapy to address the deficits listed in the problem list box on initial evaluation, provide pt/family education and to maximize pt's level of independence in the home and community environment.     Pt's spiritual, cultural and educational needs considered and pt agreeable to plan of care and goals.    Anticipated barriers to physical therapy: None     Goals:     SHORT TERM GOALS:  4 week Progress Date Met   Recent signs and systems trend is improving in order to progress towards Long term goals.  [] Met  [] Not Met  [] Progressing     Patient will be independent with Home Exercise Program  in order to further progress and return to maximal function. [] Met  [] Not Met  [] Progressing     Pain rating at Worst: 5 /10 in order to progress towards increased independence with activity. [] Met  [] Not Met  [] Progressing     Patient will be able to correct postural deviations in sitting and standing, to decrease pain and promote postural awareness for injury prevention.  [] Met  [] Not Met  [] Progressing      Patient will improve functional outcome (FOTO) score: by 5% to increase self-worth & perceived functional ability towards long term goals [] Met  [] Not Met  [] Progressing        LONG TERM GOALS: 8 weeks Progress Date Met   Patient will return to normal activites of daily living, recreational, and work related activities with less pain and limitation.  [] Met  [] Not Met  [] Progressing     Patient will improve range of motion  to stated goals in order to return to maximal functional potential.  [] Met  [] Not Met  [] Progressing     Patient will improve Strength to stated goals of appropriate musculature in order to improve functional independence.  [] Met  [] Not Met  [] Progressing     Pain Rating at Best: 1/10 to improve Quality of Life.  [] Met  [] Not Met  [] Progressing     Patient will have met/partially met personal goal of: performing all ADL without pain or assistive device [] Met  [] Not Met  [] Progressing          Plan   Plan of care Certification: 6/13/2024 to 9/13/2024     Continue to progress per protocol and per patient tolerance    Chino Krishna, PT

## 2024-12-04 ENCOUNTER — OFFICE VISIT (OUTPATIENT)
Dept: INTERNAL MEDICINE | Facility: CLINIC | Age: 34
End: 2024-12-04
Payer: MEDICARE

## 2024-12-04 DIAGNOSIS — J06.9 UPPER RESPIRATORY TRACT INFECTION, UNSPECIFIED TYPE: Primary | ICD-10-CM

## 2024-12-04 DIAGNOSIS — B20 CURRENTLY ASYMPTOMATIC HIV INFECTION, WITH HISTORY OF HIV-RELATED ILLNESS: ICD-10-CM

## 2024-12-04 PROCEDURE — 99213 OFFICE O/P EST LOW 20 MIN: CPT | Mod: 95,,, | Performed by: PEDIATRICS

## 2024-12-04 RX ORDER — METHYLPREDNISOLONE 4 MG/1
TABLET ORAL
Qty: 1 EACH | Refills: 0 | Status: SHIPPED | OUTPATIENT
Start: 2024-12-04

## 2024-12-04 NOTE — PROGRESS NOTES
TELEMEDICINE VIRTUAL VISIT    Subjective:       Patient ID: Alonzo Rajput is a 34 y.o. male.    Chief Complaint: URI    Televisit for URI    4 days of cough(NP) congestion, ST, URI symptoms. Initial fever gone, tired. Symptoms better today. OTC minimal help. Hx of HIV on treatment and negative viral load. Not this season flu/covid vaccinated. No ill contacts.    URI   Associated symptoms include congestion, coughing, headaches, neck pain and a sore throat. Pertinent negatives include no abdominal pain, chest pain, diarrhea, ear pain, plugged ear sensation, swollen glands or vomiting.   Sore Throat   This is a new problem. The current episode started yesterday. The problem has been unchanged. The pain is worse on the left side. The maximum temperature recorded prior to his arrival was 100 - 100.9 F. The fever has been present for 1 to 2 days. The pain is at a severity of 7/10. The pain is moderate. Associated symptoms include congestion, coughing, headaches, a hoarse voice, neck pain, shortness of breath and trouble swallowing. Pertinent negatives include no abdominal pain, diarrhea, drooling, ear discharge, ear pain, plugged ear sensation, stridor, swollen glands or vomiting. He has tried NSAIDs and acetaminophen for the symptoms. The treatment provided mild relief.     Review of Systems   HENT:  Positive for congestion, hoarse voice, sore throat and trouble swallowing. Negative for drooling, ear discharge and ear pain.    Respiratory:  Positive for cough and shortness of breath. Negative for stridor.    Cardiovascular:  Negative for chest pain and palpitations.   Gastrointestinal:  Negative for abdominal pain, diarrhea and vomiting.   Musculoskeletal:  Positive for neck pain.   Neurological:  Positive for headaches.       Objective:      CONSTITUTIONAL: No apparent distress. Does not appear acutely ill or septic. Appears adequately hydrated.  PULM: Breathing unlabored.  PSYCHIATRIC: Alert and conversant  "and grossly oriented. Mood is grossly neutral. Affect appropriate. Judgment and insight grossly intact.      Assessment:       1. Upper respiratory tract infection, unspecified type    2. Currently asymptomatic HIV infection, with history of HIV-related illness        Plan:       Upper respiratory tract infection, unspecified type  -     methylPREDNISolone (MEDROL DOSEPACK) 4 mg tablet; use as directed  Dispense: 1 each; Refill: 0    Currently asymptomatic HIV infection, with history of HIV-related illness       At this time outside of tamiflu if he was influenza positive and nearly out of paxlovid if covid. He is improving. Continue OTC, medrol dose pack sent in. Maintain hydration. If he fails to improve, he will need to see his PCP or ID. Flu/covid vaccines recommended next week. Total chart time 10 minutes.  Documentation entered by me for this encounter may have been done in part using speech-recognition technology. Although I have made an effort to ensure accuracy, "sound like" errors may exist and should be interpreted in context. -PALLAVI Meadows MD    Visit Details: This visit was a telemedicine virtual visit with synchronous audio and video. Alonzo reported that his location at the time of this visit was in the [facility]. Alonzo had the choice to come into office to receive these medical services. Alonzo chose and consented to receive these medical services by telemedicine.  "

## 2024-12-05 ENCOUNTER — PATIENT MESSAGE (OUTPATIENT)
Dept: INTERNAL MEDICINE | Facility: CLINIC | Age: 34
End: 2024-12-05
Payer: MEDICARE

## 2024-12-23 ENCOUNTER — TELEPHONE (OUTPATIENT)
Dept: INFECTIOUS DISEASES | Facility: CLINIC | Age: 34
End: 2024-12-23
Payer: MEDICARE

## 2024-12-23 DIAGNOSIS — B20 AIDS: ICD-10-CM

## 2024-12-23 DIAGNOSIS — B20 CURRENTLY ASYMPTOMATIC HIV INFECTION, WITH HISTORY OF HIV-RELATED ILLNESS: Primary | ICD-10-CM

## 2024-12-23 NOTE — TELEPHONE ENCOUNTER
Attempted to contact pt to r/s appt on 1/30/25 due to book out with [facility]. Pt did not answer. LVM to rtc to clinic. Message sent to pt via portal.

## 2024-12-24 DIAGNOSIS — B20 AIDS: ICD-10-CM

## 2024-12-24 RX ORDER — ABACAVIR SULFATE, DOLUTEGRAVIR SODIUM, LAMIVUDINE 600; 50; 300 MG/1; MG/1; MG/1
1 TABLET, FILM COATED ORAL DAILY
Qty: 30 TABLET | Refills: 5 | Status: SHIPPED | OUTPATIENT
Start: 2024-12-24

## 2025-01-15 ENCOUNTER — LAB VISIT (OUTPATIENT)
Dept: LAB | Facility: HOSPITAL | Age: 35
End: 2025-01-15
Attending: INTERNAL MEDICINE
Payer: MEDICARE

## 2025-01-15 DIAGNOSIS — B20 AIDS: ICD-10-CM

## 2025-01-15 DIAGNOSIS — B20 CURRENTLY ASYMPTOMATIC HIV INFECTION, WITH HISTORY OF HIV-RELATED ILLNESS: ICD-10-CM

## 2025-01-15 LAB
ALBUMIN SERPL BCP-MCNC: 4 G/DL (ref 3.5–5.2)
ALP SERPL-CCNC: 94 U/L (ref 40–150)
ALT SERPL W/O P-5'-P-CCNC: 24 U/L (ref 10–44)
ANION GAP SERPL CALC-SCNC: 9 MMOL/L (ref 8–16)
AST SERPL-CCNC: 21 U/L (ref 10–40)
BASOPHILS # BLD AUTO: 0.04 K/UL (ref 0–0.2)
BASOPHILS NFR BLD: 0.8 % (ref 0–1.9)
BILIRUB SERPL-MCNC: 0.5 MG/DL (ref 0.1–1)
BUN SERPL-MCNC: 13 MG/DL (ref 6–20)
CALCIUM SERPL-MCNC: 9.3 MG/DL (ref 8.7–10.5)
CHLORIDE SERPL-SCNC: 103 MMOL/L (ref 95–110)
CO2 SERPL-SCNC: 26 MMOL/L (ref 23–29)
CREAT SERPL-MCNC: 1.2 MG/DL (ref 0.5–1.4)
DIFFERENTIAL METHOD BLD: ABNORMAL
EOSINOPHIL # BLD AUTO: 0.2 K/UL (ref 0–0.5)
EOSINOPHIL NFR BLD: 3.1 % (ref 0–8)
ERYTHROCYTE [DISTWIDTH] IN BLOOD BY AUTOMATED COUNT: 13.4 % (ref 11.5–14.5)
EST. GFR  (NO RACE VARIABLE): >60 ML/MIN/1.73 M^2
GLUCOSE SERPL-MCNC: 75 MG/DL (ref 70–110)
HCT VFR BLD AUTO: 43.3 % (ref 40–54)
HGB BLD-MCNC: 14.2 G/DL (ref 14–18)
IMM GRANULOCYTES # BLD AUTO: 0.02 K/UL (ref 0–0.04)
IMM GRANULOCYTES NFR BLD AUTO: 0.4 % (ref 0–0.5)
LYMPHOCYTES # BLD AUTO: 2.7 K/UL (ref 1–4.8)
LYMPHOCYTES NFR BLD: 53.6 % (ref 18–48)
MCH RBC QN AUTO: 30.6 PG (ref 27–31)
MCHC RBC AUTO-ENTMCNC: 32.8 G/DL (ref 32–36)
MCV RBC AUTO: 93 FL (ref 82–98)
MONOCYTES # BLD AUTO: 0.4 K/UL (ref 0.3–1)
MONOCYTES NFR BLD: 7.5 % (ref 4–15)
NEUTROPHILS # BLD AUTO: 1.8 K/UL (ref 1.8–7.7)
NEUTROPHILS NFR BLD: 34.6 % (ref 38–73)
NRBC BLD-RTO: 0 /100 WBC
PLATELET # BLD AUTO: 365 K/UL (ref 150–450)
PMV BLD AUTO: 10 FL (ref 9.2–12.9)
POTASSIUM SERPL-SCNC: 3.6 MMOL/L (ref 3.5–5.1)
PROT SERPL-MCNC: 8.4 G/DL (ref 6–8.4)
RBC # BLD AUTO: 4.64 M/UL (ref 4.6–6.2)
SODIUM SERPL-SCNC: 138 MMOL/L (ref 136–145)
WBC # BLD AUTO: 5.09 K/UL (ref 3.9–12.7)

## 2025-01-15 PROCEDURE — 80053 COMPREHEN METABOLIC PANEL: CPT | Performed by: INTERNAL MEDICINE

## 2025-01-15 PROCEDURE — 87536 HIV-1 QUANT&REVRSE TRNSCRPJ: CPT | Performed by: INTERNAL MEDICINE

## 2025-01-15 PROCEDURE — 85025 COMPLETE CBC W/AUTO DIFF WBC: CPT | Performed by: INTERNAL MEDICINE

## 2025-01-15 PROCEDURE — 36415 COLL VENOUS BLD VENIPUNCTURE: CPT | Performed by: INTERNAL MEDICINE

## 2025-01-16 LAB
HIV1 RNA # SERPL NAA+PROBE: NOT DETECTED COPIES/ML
HIV1 RNA SERPL QL NAA+PROBE: NOT DETECTED

## 2025-02-03 ENCOUNTER — TELEPHONE (OUTPATIENT)
Dept: INFECTIOUS DISEASES | Facility: CLINIC | Age: 35
End: 2025-02-03
Payer: MEDICARE

## 2025-02-04 ENCOUNTER — OFFICE VISIT (OUTPATIENT)
Dept: UROLOGY | Facility: CLINIC | Age: 35
End: 2025-02-04
Payer: MEDICARE

## 2025-02-04 ENCOUNTER — LAB VISIT (OUTPATIENT)
Dept: LAB | Facility: HOSPITAL | Age: 35
End: 2025-02-04
Attending: INTERNAL MEDICINE
Payer: MEDICARE

## 2025-02-04 ENCOUNTER — OFFICE VISIT (OUTPATIENT)
Dept: INFECTIOUS DISEASES | Facility: CLINIC | Age: 35
End: 2025-02-04
Payer: MEDICARE

## 2025-02-04 VITALS
SYSTOLIC BLOOD PRESSURE: 126 MMHG | HEART RATE: 72 BPM | DIASTOLIC BLOOD PRESSURE: 86 MMHG | HEIGHT: 69 IN | WEIGHT: 178.56 LBS | BODY MASS INDEX: 26.45 KG/M2

## 2025-02-04 VITALS
SYSTOLIC BLOOD PRESSURE: 115 MMHG | BODY MASS INDEX: 26.62 KG/M2 | HEIGHT: 69 IN | TEMPERATURE: 98 F | HEART RATE: 61 BPM | WEIGHT: 179.69 LBS | OXYGEN SATURATION: 97 % | DIASTOLIC BLOOD PRESSURE: 79 MMHG

## 2025-02-04 DIAGNOSIS — B20 AIDS: Primary | ICD-10-CM

## 2025-02-04 DIAGNOSIS — B20 AIDS: ICD-10-CM

## 2025-02-04 DIAGNOSIS — S30.812A ABRASION OF PENIS, INITIAL ENCOUNTER: ICD-10-CM

## 2025-02-04 DIAGNOSIS — N48.89 PENILE PAIN: ICD-10-CM

## 2025-02-04 DIAGNOSIS — B20 CURRENTLY ASYMPTOMATIC HIV INFECTION, WITH HISTORY OF HIV-RELATED ILLNESS: ICD-10-CM

## 2025-02-04 DIAGNOSIS — C83.38 DIFFUSE LARGE B-CELL LYMPHOMA OF LYMPH NODES OF MULTIPLE REGIONS: ICD-10-CM

## 2025-02-04 DIAGNOSIS — N47.1 PHIMOSIS: Primary | ICD-10-CM

## 2025-02-04 PROCEDURE — 99214 OFFICE O/P EST MOD 30 MIN: CPT | Mod: PBBFAC,27 | Performed by: UROLOGY

## 2025-02-04 PROCEDURE — 99204 OFFICE O/P NEW MOD 45 MIN: CPT | Mod: S$PBB,,, | Performed by: UROLOGY

## 2025-02-04 PROCEDURE — 36415 COLL VENOUS BLD VENIPUNCTURE: CPT | Performed by: INTERNAL MEDICINE

## 2025-02-04 PROCEDURE — 99214 OFFICE O/P EST MOD 30 MIN: CPT | Mod: S$PBB,,, | Performed by: INTERNAL MEDICINE

## 2025-02-04 PROCEDURE — 87906 NFCT AGT GNTYP ALYS HIV1: CPT | Performed by: INTERNAL MEDICINE

## 2025-02-04 PROCEDURE — 99213 OFFICE O/P EST LOW 20 MIN: CPT | Mod: PBBFAC | Performed by: INTERNAL MEDICINE

## 2025-02-04 PROCEDURE — 99999 PR PBB SHADOW E&M-EST. PATIENT-LVL IV: CPT | Mod: PBBFAC,,, | Performed by: UROLOGY

## 2025-02-04 PROCEDURE — 99999 PR PBB SHADOW E&M-EST. PATIENT-LVL III: CPT | Mod: PBBFAC,,, | Performed by: INTERNAL MEDICINE

## 2025-02-04 RX ORDER — CLINDAMYCIN PHOSPHATE 900 MG/50ML
900 INJECTION, SOLUTION INTRAVENOUS
OUTPATIENT
Start: 2025-02-04

## 2025-02-04 NOTE — ASSESSMENT & PLAN NOTE
Will refer to Urology for possible circumcision as he gets pain  with more blood flow to the penis

## 2025-02-04 NOTE — PROGRESS NOTES
Chief Complaint:  Phimosis    HPI:   Alonzo Rajput is a 34 y.o. male that presents today as a referral from Dr. Roberts for phimosis.  Patient notes phimosis for quite some time but recently in December he began having splitting of the skin of his penis which has been causing him pain.  It happens when he has an erection.  No voiding issues but when he urinates and urine gets into the abrasion, it is very uncomfortable.  No gross hematuria or dysuria.  No ED. no prior urologic procedures.      PMH:  Past Medical History:   Diagnosis Date    Allergy     Bronchitis     Cancer     lymphoma    Candida esophagitis     Diffuse large B cell lymphoma     DLBCL (diffuse large B cell lymphoma) 11/5/2018    Ear infection     Encounter for blood transfusion     Fever blister     Hepatitis B     HIV (human immunodeficiency virus infection)     UGI bleed        PSH:  Past Surgical History:   Procedure Laterality Date    BONE MARROW BIOPSY Right 4/25/2019    Procedure: Biopsy-bone marrow;  Surgeon: Sarahi Garcia MD;  Location: [address];  Service: Orthopedics;  Laterality: Right;    ESOPHAGOGASTRODUODENOSCOPY      INSERTION OF TUNNELED CENTRAL VENOUS CATHETER (CVC) WITH SUBCUTANEOUS PORT Left 9/13/2018    Procedure: [id number]-X-CATH;  Surgeon: Luis Bogran-Reyes, MD;  Location: [facility];  Service: General;  Laterality: Left;    LYMPH NODE BIOPSY Left 9/13/2018    Procedure: BIOPSY, LYMPH NODE;  Surgeon: Luis Bogran-Reyes, MD;  Location: [facility];  Service: General;  Laterality: Left;  inguinal lymph node excisional biopsy    port a cath removal      PORTACATH PLACEMENT      UPPER GASTROINTESTINAL ENDOSCOPY         Family History:  Family History   Problem Relation Name Age of Onset    No Known Problems Mother      No Known Problems Father      No Known Problems Sister      No Known Problems Brother      Lupus Sister      No Known Problems Sister      No Known Problems Brother      No Known Problems Brother          Social History:  Social History     Tobacco Use    Smoking status: Former     Current packs/day: 0.00     Types: Cigarettes     Start date: 10/2/2013     Quit date: 10/2/2015     Years since quittin.3    Smokeless tobacco: Never   Substance Use Topics    Alcohol use: Yes     Alcohol/week: 0.0 standard drinks of alcohol     Comment: rarely    Drug use: Yes     Frequency: 4.0 times per week     Types: Marijuana        Review of Systems:  General: No fever, chills  Skin: No rashes  Chest:  Denies cough and sputum production  Heart: Denies chest pain  Resp: Denies dyspnea  Abdomen: Denies diarrhea, abdominal pain, hematemesis, or blood in stool.  Musculoskeletal: No joint stiffness or swelling. Denies back pain.  : see HPI  Neuro: no dizziness or weakness    Allergies:  Amoxicillin    Medications:    Current Outpatient Medications:     abacavir-dolutegravir-lamivud (TRIUMEQ) 600- mg Tab, Take 1 tablet by mouth once daily., Disp: 30 tablet, Rfl: 5    azelastine (ASTELIN) 137 mcg (0.1 %) nasal spray, 1 spray (137 mcg total) by Nasal route 2 (two) times daily., Disp: 30 mL, Rfl: 0    cetirizine (ZYRTEC) 10 MG tablet, Take 1 tablet (10 mg total) by mouth once daily., Disp: 30 tablet, Rfl: 4    diphenhydrAMINE (BENADRYL) 25 mg capsule, Take 1 each (25 mg total) by mouth every 6 (six) hours as needed for Allergies (sinus congestion)., Disp: , Rfl: 0    HYDROcodone-acetaminophen (NORCO) 5-325 mg per tablet, Take 1 tablet by mouth every 6 (six) hours as needed for Pain., Disp: 12 tablet, Rfl: 0    methylPREDNISolone (MEDROL DOSEPACK) 4 mg tablet, use as directed, Disp: 1 each, Rfl: 0    promethazine-dextromethorphan (PROMETHAZINE-DM) 6.25-15 mg/5 mL Syrp, Take 5 mLs by mouth every 6 (six) hours as needed (cough)., Disp: 118 mL, Rfl: 0    lisinopriL (ZESTRIL) 2.5 MG tablet, Take 1 tablet (2.5 mg total) by mouth once daily. May be taken hs or in am, Disp: 90 tablet, Rfl: 1    omeprazole (PRILOSEC) 20 MG capsule,  Take 1 capsule (20 mg total) by mouth daily as needed (heartburn)., Disp: 30 capsule, Rfl: 3    Physical Exam:  Vitals:    02/04/25 1525   BP: 126/86   Pulse: 72     Body mass index is 26.37 kg/m².  General: awake, alert, cooperative  Head: NC/AT  Ears: external ears normal  Eyes: sclera normal  Lungs: normal inspiration, NAD  Heart: well-perfused  Abdomen: Soft, NT, ND  : uncirc'd phallus, severe phimosis noted, meatus normal in size and position and able to be visualized, but the remainder of the glans could not be visualized but is palpably normal, BL testicles palpable, no masses, nontender, no abnormalities of epididymi  Lymphatic: groin nodes negative  Skin: The skin is warm and dry  Ext: No c/c/e.  Neuro: grossly intact, AOx3    RADIOLOGY:  No recent relevant imaging available for review.    LABS:  I personally reviewed the following lab values:  Lab Results   Component Value Date    WBC 5.09 01/15/2025    HGB 14.2 01/15/2025    HCT 43.3 01/15/2025     01/15/2025     01/15/2025    K 3.6 01/15/2025     01/15/2025    CREATININE 1.2 01/15/2025    BUN 13 01/15/2025    CO2 26 01/15/2025    TSH 1.136 09/23/2015    INR 1.1 11/05/2018    HGBA1C 5.2 11/29/2018    CHOL 157 03/14/2019    TRIG 263 (H) 03/14/2019    HDL 35 (L) 03/14/2019    ALT 24 01/15/2025    AST 21 01/15/2025     Assessment/Plan:   Alonzo Rajput is a 34 y.o. male with:    Phimosis - I reviewed options for phimosis including steroid cream as well as surgical options like a dorsal slit or circumcision.  I explained that any surgical option has the potential of a changing penile sensation and thus would likely make a sexual activity less pleasurable.  I also noted risks of surgery include pain, bleeding, infection, aesthetic changes, and risks of anesthesia.  To OR for circumcision 3/3.    Thank you for allowing me the opportunity to participate in this patient's care.     Bry Evans MD  Urology

## 2025-02-04 NOTE — PROGRESS NOTES
HIV Follow-up Visit  Alonzo Rajput is here for follow-up of HIV infection. He is feeling better since his last visit.     Patient is compliant with HIV medications.  HIV - labs-   1/15/25-  HIV viral load - not detected  Patient does not have side effects with the HIV medications.none    The following portions of the patient's history were reviewed and updated as appropriate: allergies, current medications, past family history, past medical history, past social history, past surgical history, and problem list.    Review of Systems   Constitutional:  Negative for chills, fever, malaise/fatigue and weight loss.   Eyes:  Negative for blurred vision and double vision.   Genitourinary:         Pain over glans penis when erect   Endo/Heme/Allergies:  Negative for environmental allergies. Does not bruise/bleed easily.          Physical Exam  Vitals and nursing note reviewed.   Cardiovascular:      Rate and Rhythm: Normal rate.   Musculoskeletal:         General: Normal range of motion.      Cervical back: Normal range of motion.   Skin:     General: Skin is warm.   Neurological:      General: No focal deficit present.      Mental Status: He is alert.          Immunization History   Administered Date(s) Administered    COVID-19, MRNA, LN-S, PF (Pfizer) (Purple Cap) 08/23/2021, 09/16/2021    DTaP 1989, 03/19/1990, 02/05/1992, 08/19/1992, 06/01/1995    HIB 02/05/1992    HPV 9-Valent 12/11/2023    Hepatitis B, Pediatric/Adolescent 06/01/1995, 06/29/1995, 07/01/2008    IPV 1989, 03/19/1990, 02/05/1992, 06/01/1995    Influenza 11/22/2005, 11/08/2006    Influenza - Quadrivalent - PF *Preferred* (6 months and older) 11/22/2005, 11/08/2006, 10/02/2015, 09/30/2016, 11/20/2017, 10/30/2019, 11/04/2020, 10/27/2021, 01/23/2023    MMR 02/05/1992, 06/01/1995    Meningococcal Conjugate (MCV4P) 11/08/2006    PPD Test 10/02/2015, 04/07/2016    Pneumococcal Conjugate - 13 Valent 10/02/2015    Pneumococcal Polysaccharide -  23 Valent 03/31/2016, 06/02/2021    Td - PF (ADULT) 09/30/2016    Tdap 11/08/2006             Assessment:  1. AIDS  Ambulatory referral/consult to Urology    GENOSURE ARCHIVE DNA SEQUENCING      2. Currently asymptomatic HIV infection, with history of HIV-related illness        3. Penile pain        4. Diffuse large B-cell lymphoma of lymph nodes of multiple regions          Plan:  1. AIDS  -     Ambulatory referral/consult to Urology; Future; Expected date: 02/11/2025  -     GENOSURE ARCHIVE DNA SEQUENCING; Future; Expected date: 02/04/2025    2. Currently asymptomatic HIV infection, with history of HIV-related illness  Assessment & Plan:  The viral load remains undetectable and the CD4 count remains steady .  He has good virologic and immunologic control of HIV.   Will plan to use Juluca- will also check HIV Gensure           3. Penile pain  Assessment & Plan:  Will refer to Urology for possible circumcision as he gets pain  with more blood flow to the penis       4. Diffuse large B-cell lymphoma of lymph nodes of multiple regions  Assessment & Plan:  Oncology follow up           The patient has been counseled regarding the importance of compliance with every dose of HIV medications. Possible side effects have been reviewed and the patients questions have been answered.

## 2025-02-04 NOTE — ASSESSMENT & PLAN NOTE
The viral load remains undetectable and the CD4 count remains steady .  He has good virologic and immunologic control of HIV.   Will plan to use Juluca- will also check HIV Gensure

## 2025-02-06 DIAGNOSIS — C83.38 DIFFUSE LARGE B-CELL LYMPHOMA OF LYMPH NODES OF MULTIPLE REGIONS: Primary | ICD-10-CM

## 2025-02-26 ENCOUNTER — ANESTHESIA EVENT (OUTPATIENT)
Dept: SURGERY | Facility: HOSPITAL | Age: 35
End: 2025-02-26
Payer: MEDICARE

## 2025-02-27 ENCOUNTER — TELEPHONE (OUTPATIENT)
Dept: UROLOGY | Facility: CLINIC | Age: 35
End: 2025-02-27
Payer: MEDICARE

## 2025-02-27 NOTE — TELEPHONE ENCOUNTER
Patients sx was moved to 06/23/25. Spoke to Emily at the sx desk to get the dates changed.       ----- Message from MEKA Donohue sent at 2/26/2025  3:44 PM CST -----  Regarding: surgery 3/03  Good Afternoon,Pt would like to cancel surgery and reschedule for a later date.Pt's surgery scheduled for 3/03/25.Thank you,Marge

## 2025-03-03 ENCOUNTER — ANESTHESIA (OUTPATIENT)
Dept: SURGERY | Facility: HOSPITAL | Age: 35
End: 2025-03-03
Payer: MEDICARE

## 2025-03-31 ENCOUNTER — LAB VISIT (OUTPATIENT)
Dept: LAB | Facility: HOSPITAL | Age: 35
End: 2025-03-31
Attending: INTERNAL MEDICINE
Payer: MEDICARE

## 2025-03-31 ENCOUNTER — PATIENT MESSAGE (OUTPATIENT)
Dept: HEMATOLOGY/ONCOLOGY | Facility: CLINIC | Age: 35
End: 2025-03-31
Payer: MEDICARE

## 2025-03-31 DIAGNOSIS — C83.38 DIFFUSE LARGE B-CELL LYMPHOMA OF LYMPH NODES OF MULTIPLE REGIONS: ICD-10-CM

## 2025-03-31 LAB
ABSOLUTE EOSINOPHIL (OHS): 0.28 K/UL
ABSOLUTE MONOCYTE (OHS): 0.43 K/UL (ref 0.3–1)
ABSOLUTE NEUTROPHIL COUNT (OHS): 1.17 K/UL (ref 1.8–7.7)
ALBUMIN SERPL BCP-MCNC: 4.1 G/DL (ref 3.5–5.2)
ALP SERPL-CCNC: 77 UNIT/L (ref 40–150)
ALT SERPL W/O P-5'-P-CCNC: 55 UNIT/L (ref 10–44)
ANION GAP (OHS): 11 MMOL/L (ref 8–16)
ANISOCYTOSIS BLD QL SMEAR: SLIGHT
AST SERPL-CCNC: 36 UNIT/L (ref 11–45)
BASOPHILS # BLD AUTO: 0.03 K/UL
BASOPHILS NFR BLD AUTO: 0.6 %
BILIRUB SERPL-MCNC: 0.6 MG/DL (ref 0.1–1)
BUN SERPL-MCNC: 15 MG/DL (ref 6–20)
CALCIUM SERPL-MCNC: 9.7 MG/DL (ref 8.7–10.5)
CHLORIDE SERPL-SCNC: 105 MMOL/L (ref 95–110)
CO2 SERPL-SCNC: 24 MMOL/L (ref 23–29)
CREAT SERPL-MCNC: 1.3 MG/DL (ref 0.5–1.4)
ERYTHROCYTE [DISTWIDTH] IN BLOOD BY AUTOMATED COUNT: 13.2 % (ref 11.5–14.5)
GFR SERPLBLD CREATININE-BSD FMLA CKD-EPI: >60 ML/MIN/1.73/M2
GLUCOSE SERPL-MCNC: 87 MG/DL (ref 70–110)
HCT VFR BLD AUTO: 42.7 % (ref 40–54)
HGB BLD-MCNC: 14.6 GM/DL (ref 14–18)
IMM GRANULOCYTES # BLD AUTO: 0 K/UL (ref 0–0.04)
IMM GRANULOCYTES NFR BLD AUTO: 0 % (ref 0–0.5)
LARGE/GIANT PLATELETS (OHS): PRESENT
LDH SERPL-CCNC: 176 U/L (ref 110–260)
LYMPHOCYTES # BLD AUTO: 2.92 K/UL (ref 1–4.8)
MCH RBC QN AUTO: 31.1 PG (ref 27–31)
MCHC RBC AUTO-ENTMCNC: 34.2 G/DL (ref 32–36)
MCV RBC AUTO: 91 FL (ref 82–98)
NUCLEATED RBC (/100WBC) (OHS): 0 /100 WBC
PLATELET # BLD AUTO: 257 K/UL (ref 150–450)
PLATELET BLD QL SMEAR: NORMAL
PMV BLD AUTO: 9.9 FL (ref 9.2–12.9)
POTASSIUM SERPL-SCNC: 3.7 MMOL/L (ref 3.5–5.1)
PROT SERPL-MCNC: 8.4 GM/DL (ref 6–8.4)
RBC # BLD AUTO: 4.7 M/UL (ref 4.6–6.2)
RELATIVE EOSINOPHIL (OHS): 5.8 %
RELATIVE LYMPHOCYTE (OHS): 60.5 % (ref 18–48)
RELATIVE MONOCYTE (OHS): 8.9 % (ref 4–15)
RELATIVE NEUTROPHIL (OHS): 24.2 % (ref 38–73)
SODIUM SERPL-SCNC: 140 MMOL/L (ref 136–145)
WBC # BLD AUTO: 4.83 K/UL (ref 3.9–12.7)

## 2025-03-31 PROCEDURE — 36415 COLL VENOUS BLD VENIPUNCTURE: CPT

## 2025-03-31 PROCEDURE — 83615 LACTATE (LD) (LDH) ENZYME: CPT

## 2025-03-31 PROCEDURE — 85025 COMPLETE CBC W/AUTO DIFF WBC: CPT

## 2025-03-31 PROCEDURE — 80053 COMPREHEN METABOLIC PANEL: CPT

## 2025-04-09 ENCOUNTER — OFFICE VISIT (OUTPATIENT)
Dept: HEMATOLOGY/ONCOLOGY | Facility: CLINIC | Age: 35
End: 2025-04-09
Payer: MEDICARE

## 2025-04-09 DIAGNOSIS — C83.32 DIFFUSE LARGE B-CELL LYMPHOMA OF INTRATHORACIC LYMPH NODES: Primary | ICD-10-CM

## 2025-04-09 DIAGNOSIS — D70.2 OTHER DRUG-INDUCED NEUTROPENIA: ICD-10-CM

## 2025-04-09 NOTE — PROGRESS NOTES
Hematology and Medical Oncology   Follow Up     04/09/2025    Primary Oncologic Diagnosis: Relapsed aggressive lymphoma    Telemedicine Documentation:  The patient location is: home  The chief complaint leading to consultation is: lymphoma follow up    Visit type: audiovisual    Face to Face time with patient: 25  30 minutes of total time spent on the encounter, which includes face to face time and non-face to face time preparing to see the patient (eg, review of tests), Obtaining and/or reviewing separately obtained history, Documenting clinical information in the electronic or other health record, Independently interpreting results (not separately reported) and communicating results to the patient/family/caregiver, or Care coordination (not separately reported).     Each patient to whom he or she provides medical services by telemedicine is:  (1) informed of the relationship between the physician and patient and the respective role of any other health care provider with respect to management of the patient; and (2) notified that he or she may decline to receive medical services by telemedicine and may withdraw from such care at any time.    History of Present Ilness:   Alonzo Rajput (Alonzo) is a pleasant 34 y.o.male who has currently receiving salvage RICE therapy. Completed cycle 4 at the end of December 2018. He presents today to discuss auto stem cell transplant options.    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   --9/11/2018 PET/CT shows Extensive hypermetabolic adenopathy involving the neck, chest, and lower abdomen/pelvis with index lesions detailed  above most suggestive of lymphoma.  Also increased activity involving adenoidal and palatine tonsillar tissues may be reactive or could indicate lymphoma involvement.  There is diffuse mild increased activity within the spleen.   --BM biopsy 9/12/2018 negative for disease  --Lymph node, left inguinal, excision on 9/13/2018  --RICE x 3 has been complicated with disseminated shingles, now recovered.  --PET 12/15/19 Abnormal focus of increased radiotracer uptake identified in the left submandibular gland, SUV max 6.69  --Cycle 4 of Rice on 12/26/18    Interval History:  Mr. Rajput has been doing well. Denies B symptoms. 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 Happy to hear labs are stable.     Continues to keep off the weight following intentional weight loss.       He has not experienced any additional painless or enlarged nodes. Continues to deny night sweats, lymphadenopathy, unintentional weight loss.      PAST MEDICAL HISTORY:   Past Medical History:   Diagnosis Date    Allergy     Bronchitis     Cancer     lymphoma    Candida esophagitis     Diffuse large B cell lymphoma     DLBCL (diffuse large B cell lymphoma) 11/5/2018    Ear infection     Encounter for blood transfusion     Fever blister     Hepatitis B     HIV (human immunodeficiency virus infection)     UGI bleed        PAST SURGICAL HISTORY:   Past Surgical History:   Procedure Laterality Date    BONE MARROW BIOPSY Right 4/25/2019    Procedure: Biopsy-bone marrow;  Surgeon: Sarahi Garcia MD;  Location: [address];  Service: Orthopedics;  Laterality: Right;    ESOPHAGOGASTRODUODENOSCOPY      INSERTION OF TUNNELED CENTRAL VENOUS CATHETER (CVC) WITH SUBCUTANEOUS PORT Left 9/13/2018    Procedure: [id number]-M-CATH;  Surgeon: Luis Bogran-Reyes, MD;  Location: [facility] OR;   Service: General;  Laterality: Left;    LYMPH NODE BIOPSY Left 9/13/2018    Procedure: BIOPSY, LYMPH NODE;  Surgeon: Luis Bogran-Reyes, MD;  Location: [facility];  Service: General;  Laterality: Left;  inguinal lymph node excisional biopsy    port a cath removal      PORTACATH PLACEMENT      UPPER GASTROINTESTINAL ENDOSCOPY         PAST SOCIAL HISTORY:   reports that he quit smoking about 9 years ago. His smoking use included cigarettes. He started smoking about 11 years ago. He has never used smokeless tobacco. He reports current alcohol use. He reports current drug use. Frequency: 4.00 times per week. Drug: Marijuana.    FAMILY HISTORY:  Family History   Problem Relation Name Age of Onset    No Known Problems Mother      No Known Problems Father      No Known Problems Sister      No Known Problems Brother      Lupus Sister      No Known Problems Sister      No Known Problems Brother      No Known Problems Brother         CURRENT MEDICATIONS:   Current Outpatient Medications   Medication Sig    abacavir-dolutegravir-lamivud (TRIUMEQ) 600- mg Tab Take 1 tablet by mouth once daily.    azelastine (ASTELIN) 137 mcg (0.1 %) nasal spray 1 spray (137 mcg total) by Nasal route 2 (two) times daily.    cetirizine (ZYRTEC) 10 MG tablet Take 1 tablet (10 mg total) by mouth once daily.    diphenhydrAMINE (BENADRYL) 25 mg capsule Take 1 each (25 mg total) by mouth every 6 (six) hours as needed for Allergies (sinus congestion).    HYDROcodone-acetaminophen (NORCO) 5-325 mg per tablet Take 1 tablet by mouth every 6 (six) hours as needed for Pain.    methylPREDNISolone (MEDROL DOSEPACK) 4 mg tablet use as directed    promethazine-dextromethorphan (PROMETHAZINE-DM) 6.25-15 mg/5 mL Syrp Take 5 mLs by mouth every 6 (six) hours as needed (cough).    lisinopriL (ZESTRIL) 2.5 MG tablet Take 1 tablet (2.5 mg total) by mouth once daily. May be taken hs or in am    omeprazole (PRILOSEC) 20 MG capsule Take 1 capsule (20 mg total) by mouth  daily as needed (heartburn).     No current facility-administered medications for this visit.     ALLERGIES:   Review of patient's allergies indicates:   Allergen Reactions    Amoxicillin Swelling         Review of Systems:     Review of Systems   Constitutional:  Positive for fatigue. Negative for appetite change, chills, diaphoresis, fever and unexpected weight change.   HENT:   Negative for hearing loss, mouth sores, nosebleeds, sore throat, trouble swallowing and voice change.    Eyes:  Negative for eye problems and icterus.   Respiratory:  Negative for chest tightness, cough, hemoptysis, shortness of breath and wheezing.    Cardiovascular:  Negative for chest pain, leg swelling and palpitations.   Gastrointestinal:  Negative for abdominal distention, abdominal pain, blood in stool, diarrhea, nausea and vomiting.   Endocrine: Negative for hot flashes.   Genitourinary:  Negative for bladder incontinence, difficulty urinating, dysuria, frequency and hematuria.    Musculoskeletal:  Negative for arthralgias, back pain, flank pain, gait problem, myalgias, neck pain and neck stiffness.   Skin:  Negative for itching, rash and wound.   Neurological:  Negative for dizziness, extremity weakness, gait problem, headaches, numbness, seizures and speech difficulty.   Hematological:  Negative for adenopathy. Does not bruise/bleed easily.   Psychiatric/Behavioral:  Negative for confusion, depression and sleep disturbance. The patient is not nervous/anxious.         Physical Exam:     Limited secondary to virtual visit    ECOG Performance Status: (foot note - ECOG PS provided by Eastern Cooperative Oncology Group) 0 - Asymptomatic    Karnofsky Performance Score:  100%- Normal, No Complaints, No Evidence of Disease    Labs:   Lab Results   Component Value Date    WBC 4.83 03/31/2025    HGB 14.6 03/31/2025    HCT 42.7 03/31/2025     03/31/2025    CHOL 157 03/14/2019    TRIG 263 (H) 03/14/2019    HDL 35 (L) 03/14/2019    ALT  55 (H) 03/31/2025    AST 36 03/31/2025     03/31/2025    K 3.7 03/31/2025     03/31/2025    CREATININE 1.3 03/31/2025    BUN 15 03/31/2025    CO2 24 03/31/2025    TSH 1.136 09/23/2015    INR 1.1 11/05/2018    HGBA1C 5.2 11/29/2018     LDH: 176    Imaging: Previous imaging has been reviewed    Assessment and Plan:     Mr. 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 He has delayed the eval process to the point that there is no data regarding transplant this far removed  --7 years post salvage therapy. Okay to follow with PCP or with us annually     Fatigue  --Will check thyroid and hormone labs with next visit      HIV  --Currently on Triumeq with good disease control  --Previously on and off HARRT therapy, we discussed the importance of medication adherence      I have provided the patient with an opportunity to ask questions and have all questions answered to his satisfaction.       he will return to clinic PRN with labs, but knows to call in the interim if symptoms change or should a problem arise.        Sarahi Garcia MD  Hematology and Medical Oncology  Bone Marrow Transplant  [facility]          BMT Chart Routing      Follow up with physician . 1. follow up with us PRN   Follow up with NABEEL    Provider visit type    Infusion scheduling note    Injection scheduling note    Labs    Imaging    Pharmacy appointment    Other referrals

## 2025-05-26 ENCOUNTER — LAB VISIT (OUTPATIENT)
Dept: LAB | Facility: HOSPITAL | Age: 35
End: 2025-05-26
Attending: INTERNAL MEDICINE
Payer: MEDICARE

## 2025-05-26 DIAGNOSIS — B20 AIDS: ICD-10-CM

## 2025-05-26 DIAGNOSIS — B20 CURRENTLY ASYMPTOMATIC HIV INFECTION, WITH HISTORY OF HIV-RELATED ILLNESS: ICD-10-CM

## 2025-05-26 LAB
ABSOLUTE EOSINOPHIL (OHS): 0.09 K/UL
ABSOLUTE MONOCYTE (OHS): 0.54 K/UL (ref 0.3–1)
ABSOLUTE NEUTROPHIL COUNT (OHS): 1.24 K/UL (ref 1.8–7.7)
ALBUMIN SERPL BCP-MCNC: 3.7 G/DL (ref 3.5–5.2)
ALP SERPL-CCNC: 86 UNIT/L (ref 40–150)
ALT SERPL W/O P-5'-P-CCNC: 20 UNIT/L (ref 10–44)
ANION GAP (OHS): 10 MMOL/L (ref 8–16)
AST SERPL-CCNC: 23 UNIT/L (ref 11–45)
BASOPHILS # BLD AUTO: 0.03 K/UL
BASOPHILS NFR BLD AUTO: 0.8 %
BILIRUB SERPL-MCNC: 0.6 MG/DL (ref 0.1–1)
BUN SERPL-MCNC: 13 MG/DL (ref 6–20)
CALCIUM SERPL-MCNC: 8.8 MG/DL (ref 8.7–10.5)
CHLORIDE SERPL-SCNC: 101 MMOL/L (ref 95–110)
CO2 SERPL-SCNC: 23 MMOL/L (ref 23–29)
CREAT SERPL-MCNC: 1.2 MG/DL (ref 0.5–1.4)
ERYTHROCYTE [DISTWIDTH] IN BLOOD BY AUTOMATED COUNT: 14.3 % (ref 11.5–14.5)
GFR SERPLBLD CREATININE-BSD FMLA CKD-EPI: >60 ML/MIN/1.73/M2
GLUCOSE SERPL-MCNC: 102 MG/DL (ref 70–110)
HCT VFR BLD AUTO: 39.5 % (ref 40–54)
HGB BLD-MCNC: 13.1 GM/DL (ref 14–18)
IMM GRANULOCYTES # BLD AUTO: 0 K/UL (ref 0–0.04)
IMM GRANULOCYTES NFR BLD AUTO: 0 % (ref 0–0.5)
LYMPHOCYTES # BLD AUTO: 1.94 K/UL (ref 1–4.8)
MCH RBC QN AUTO: 31.3 PG (ref 27–31)
MCHC RBC AUTO-ENTMCNC: 33.2 G/DL (ref 32–36)
MCV RBC AUTO: 94 FL (ref 82–98)
NUCLEATED RBC (/100WBC) (OHS): 0 /100 WBC
PLATELET # BLD AUTO: 283 K/UL (ref 150–450)
PMV BLD AUTO: 10.2 FL (ref 9.2–12.9)
POTASSIUM SERPL-SCNC: 3.2 MMOL/L (ref 3.5–5.1)
PROT SERPL-MCNC: 7.8 GM/DL (ref 6–8.4)
RBC # BLD AUTO: 4.19 M/UL (ref 4.6–6.2)
RELATIVE EOSINOPHIL (OHS): 2.3 %
RELATIVE LYMPHOCYTE (OHS): 50.5 % (ref 18–48)
RELATIVE MONOCYTE (OHS): 14.1 % (ref 4–15)
RELATIVE NEUTROPHIL (OHS): 32.3 % (ref 38–73)
SODIUM SERPL-SCNC: 134 MMOL/L (ref 136–145)
WBC # BLD AUTO: 3.84 K/UL (ref 3.9–12.7)

## 2025-05-26 PROCEDURE — 80053 COMPREHEN METABOLIC PANEL: CPT

## 2025-05-26 PROCEDURE — 87536 HIV-1 QUANT&REVRSE TRNSCRPJ: CPT

## 2025-05-26 PROCEDURE — 85025 COMPLETE CBC W/AUTO DIFF WBC: CPT

## 2025-05-26 PROCEDURE — 36415 COLL VENOUS BLD VENIPUNCTURE: CPT

## 2025-05-27 LAB
HIV1 RNA # SERPL NAA+PROBE: ABNORMAL COPIES/ML
HIV1 RNA SERPL NAA+PROBE-LOG#: 4.34 LOG COPIES/ML
HIV1 RNA SERPL NAA+PROBE-LOG#: DETECTED {LOG_COPIES}/ML

## 2025-05-28 ENCOUNTER — RESULTS FOLLOW-UP (OUTPATIENT)
Dept: INFECTIOUS DISEASES | Facility: HOSPITAL | Age: 35
End: 2025-05-28
Payer: MEDICARE

## 2025-05-28 ENCOUNTER — TELEPHONE (OUTPATIENT)
Dept: INFECTIOUS DISEASES | Facility: CLINIC | Age: 35
End: 2025-05-28
Payer: MEDICARE

## 2025-05-28 DIAGNOSIS — B20 AIDS: Primary | ICD-10-CM

## 2025-05-28 NOTE — TELEPHONE ENCOUNTER
----- Message from Bry Roberts MD, QI sent at 5/28/2025  1:09 PM CDT -----  Will do HIV genotype and HIV Integrase    ----- Message -----  From: Lab, Background User  Sent: 5/26/2025   9:01 PM CDT  To: Bry Roberts MD, QI

## 2025-05-28 NOTE — TELEPHONE ENCOUNTER
Attempted to contact patient to inform of some results and schedule labs per Dr. Roberts. Follow up labs have been placed by Dr. Roberts. Pt did not answer, lvm to rtc to clinic. Message sent via portal.

## 2025-05-29 NOTE — PROGRESS NOTES
After verifying two patient identifiers, results given. Patient states that he has missed doses of his medication but has since began taking regularly. Pt scheduled for new labs per Dr. Roberts. Patient verbalized understanding and agreed to appt date, time, and location.

## 2025-06-03 ENCOUNTER — OFFICE VISIT (OUTPATIENT)
Dept: INFECTIOUS DISEASES | Facility: CLINIC | Age: 35
End: 2025-06-03
Payer: MEDICARE

## 2025-06-03 ENCOUNTER — LAB VISIT (OUTPATIENT)
Dept: LAB | Facility: HOSPITAL | Age: 35
End: 2025-06-03
Attending: INTERNAL MEDICINE
Payer: MEDICARE

## 2025-06-03 DIAGNOSIS — B20 CURRENTLY ASYMPTOMATIC HIV INFECTION, WITH HISTORY OF HIV-RELATED ILLNESS: ICD-10-CM

## 2025-06-03 DIAGNOSIS — B20 AIDS: ICD-10-CM

## 2025-06-03 DIAGNOSIS — C83.398 DIFFUSE LARGE B-CELL LYMPHOMA OF SOLID ORGAN EXCLUDING SPLEEN: Primary | ICD-10-CM

## 2025-06-03 PROCEDURE — 87900 PHENOTYPE INFECT AGENT DRUG: CPT

## 2025-06-03 PROCEDURE — 36415 COLL VENOUS BLD VENIPUNCTURE: CPT

## 2025-06-03 PROCEDURE — 98005 SYNCH AUDIO-VIDEO EST LOW 20: CPT | Mod: 95,,, | Performed by: INTERNAL MEDICINE

## 2025-06-03 PROCEDURE — 87906 NFCT AGT GNTYP ALYS HIV1: CPT

## 2025-06-16 ENCOUNTER — RESULTS FOLLOW-UP (OUTPATIENT)
Dept: INFECTIOUS DISEASES | Facility: HOSPITAL | Age: 35
End: 2025-06-16

## 2025-06-19 ENCOUNTER — PATIENT MESSAGE (OUTPATIENT)
Dept: UROLOGY | Facility: CLINIC | Age: 35
End: 2025-06-19
Payer: MEDICARE

## 2025-06-19 ENCOUNTER — TELEPHONE (OUTPATIENT)
Dept: UROLOGY | Facility: CLINIC | Age: 35
End: 2025-06-19
Payer: MEDICARE

## 2025-06-19 DIAGNOSIS — Z01.818 PRE-OP EXAM: Primary | ICD-10-CM

## 2025-06-19 NOTE — TELEPHONE ENCOUNTER
Tried calling pt twice there was no answer. Left VM letting the pt know about his EKG needed scheduled for tomorrow. A [facility] message was also sent to the patient on his portal.       ----- Message from MEKA Lopez sent at 6/19/2025 12:33 PM CDT -----  Hello, Pre-Admit Testing Department is inquiring about this surgery patient's EKG for their procedure on MONDAY 6/23/25. Please let us know if it will be available in Epic, or will be done in Preop the day of surgery.       Thank you

## 2025-06-20 ENCOUNTER — PATIENT MESSAGE (OUTPATIENT)
Dept: INFECTIOUS DISEASES | Facility: CLINIC | Age: 35
End: 2025-06-20
Payer: MEDICARE

## 2025-06-20 DIAGNOSIS — B20 CURRENTLY ASYMPTOMATIC HIV INFECTION, WITH HISTORY OF HIV-RELATED ILLNESS: Primary | ICD-10-CM

## 2025-06-22 ENCOUNTER — ON-DEMAND VIRTUAL (OUTPATIENT)
Dept: URGENT CARE | Facility: CLINIC | Age: 35
End: 2025-06-22
Payer: MEDICARE

## 2025-06-22 DIAGNOSIS — K08.89 PAIN, DENTAL: Primary | ICD-10-CM

## 2025-06-22 DIAGNOSIS — K04.7 DENTAL ABSCESS: ICD-10-CM

## 2025-06-22 PROCEDURE — 98005 SYNCH AUDIO-VIDEO EST LOW 20: CPT | Mod: 95,,, | Performed by: NURSE PRACTITIONER

## 2025-06-22 RX ORDER — DOXYCYCLINE 100 MG/1
100 CAPSULE ORAL 2 TIMES DAILY
Qty: 14 CAPSULE | Refills: 0 | Status: SHIPPED | OUTPATIENT
Start: 2025-06-22 | End: 2025-06-29

## 2025-06-22 RX ORDER — CHLORHEXIDINE GLUCONATE ORAL RINSE 1.2 MG/ML
15 SOLUTION DENTAL 2 TIMES DAILY
Qty: 420 ML | Refills: 0 | Status: SHIPPED | OUTPATIENT
Start: 2025-06-22 | End: 2025-07-06

## 2025-06-22 NOTE — PATIENT INSTRUCTIONS

## 2025-06-22 NOTE — PROGRESS NOTES
Subjective:      Patient ID: Alonzo Rajput is a 34 y.o. male.    Vitals:  vitals were not taken for this visit.     Chief Complaint: Dental Problem      Visit Type: TELE AUDIOVISUAL    Patient Location: [place]     Present with the patient at the time of consultation: TELEMED PRESENT WITH PATIENT: None    Past Medical History:   Diagnosis Date    Allergy     Bronchitis     Cancer     lymphoma    Candida esophagitis     Diffuse large B cell lymphoma     DLBCL (diffuse large B cell lymphoma) 11/5/2018    Ear infection     Encounter for blood transfusion     Fever blister     Hepatitis B     HIV (human immunodeficiency virus infection)     UGI bleed      Past Surgical History:   Procedure Laterality Date    BONE MARROW BIOPSY Right 4/25/2019    Procedure: Biopsy-bone marrow;  Surgeon: Sarahi Garcia MD;  Location: [address];  Service: Orthopedics;  Laterality: Right;    ESOPHAGOGASTRODUODENOSCOPY      INSERTION OF TUNNELED CENTRAL VENOUS CATHETER (CVC) WITH SUBCUTANEOUS PORT Left 9/13/2018    Procedure: [id number]-E-CATH;  Surgeon: Luis Bogran-Reyes, MD;  Location: [facility];  Service: General;  Laterality: Left;    LYMPH NODE BIOPSY Left 9/13/2018    Procedure: BIOPSY, LYMPH NODE;  Surgeon: Luis Bogran-Reyes, MD;  Location: [facility];  Service: General;  Laterality: Left;  inguinal lymph node excisional biopsy    port a cath removal      PORTACATH PLACEMENT      UPPER GASTROINTESTINAL ENDOSCOPY       Review of patient's allergies indicates:   Allergen Reactions    Amoxicillin Swelling and Anaphylaxis     Medications Ordered Prior to Encounter[1]  Family History   Problem Relation Name Age of Onset    No Known Problems Mother      No Known Problems Father      No Known Problems Sister      No Known Problems Brother      Lupus Sister      No Known Problems Sister      No Known Problems Brother      No Known Problems Brother         Medications Ordered                [id number] -  [place] - [address]   [address] [address]    Telephone: [phone]   Fax: [phone]   Hours: Not open 24 hours                         E-Prescribed (2 of 2)              chlorhexidine (PERIDEX) 0.12 % solution    Sig: Use as directed 15 mLs in the mouth or throat 2 (two) times daily. Swish for 30 seconds then spit for 14 days       Start: 6/22/25     Quantity: 420 mL Refills: 0                         doxycycline (VIBRAMYCIN) 100 MG Cap    Sig: Take 1 capsule (100 mg total) by mouth 2 (two) times daily. for 7 days       Start: 6/22/25     Quantity: 14 capsule Refills: 0                           No questionnaires on file.    Pt presents with c/o dental pain to Left upper molar x 1-2 day with gum swelling and facial swelling.  Taking Advil and mouthwash, with minimal relief.  Able to swallow okay, speaking clear.         Constitution: Negative for fever.   HENT:  Positive for dental problem and facial swelling. Negative for drooling and trouble swallowing.    Respiratory:  Negative for shortness of breath.    Neurological:  Negative for dizziness.        Objective:   The physical exam was conducted virtually.  Physical Exam   Constitutional: He is oriented to person, place, and time. No distress.   HENT:   Head: Normocephalic.   Ears:   Right Ear: External ear normal.   Left Ear: External ear normal.   Nose: No rhinorrhea or congestion.   Eyes: Conjunctivae are normal.   Neck: Neck supple.   Pulmonary/Chest: Effort normal. No respiratory distress.   Neurological: He is alert and oriented to person, place, and time.   Skin: Skin is no rash.       Assessment:     1. Pain, dental    2. Dental abscess        Plan:   Take meds as directed   Soft foods  Warm salt water gargles, oragel okay to use  Follow up with dental as directed       Pain, dental  -     doxycycline (VIBRAMYCIN) 100 MG Cap; Take 1 capsule (100 mg total) by mouth 2 (two) times daily. for 7 days  Dispense: 14 capsule;  Refill: 0  -     chlorhexidine (PERIDEX) 0.12 % solution; Use as directed 15 mLs in the mouth or throat 2 (two) times daily. Swish for 30 seconds then spit for 14 days  Dispense: 420 mL; Refill: 0    Dental abscess  -     doxycycline (VIBRAMYCIN) 100 MG Cap; Take 1 capsule (100 mg total) by mouth 2 (two) times daily. for 7 days  Dispense: 14 capsule; Refill: 0  -     chlorhexidine (PERIDEX) 0.12 % solution; Use as directed 15 mLs in the mouth or throat 2 (two) times daily. Swish for 30 seconds then spit for 14 days  Dispense: 420 mL; Refill: 0    We appreciate you trusting us with your medical care. We hope you feel better soon. We will be happy to take care of you for all of your future medical needs.     You must understand that you've received Virtual treatment only and that you may be released before all your medical problems are known or treated. You, the patient, will arrange for follow up care as instructed.     Follow up with your PCP or specialty clinic as directed in the next 1-2 weeks if not improved or as needed. You can call [phone] to schedule an appointment with the appropriate provider.     If your condition worsens we recommend that you receive another evaluation in person, with your primary care provider, urgent care or at the emergency room immediately or contact your primary medical clinics after hours call service to discuss your concerns.                     [1]   Current Outpatient Medications on File Prior to Visit   Medication Sig Dispense Refill    abacavir-dolutegravir-lamivud (TRIUMEQ) 600- mg Tab Take 1 tablet by mouth once daily. 30 tablet 5    azelastine (ASTELIN) 137 mcg (0.1 %) nasal spray 1 spray (137 mcg total) by Nasal route 2 (two) times daily. 30 mL 0    cetirizine (ZYRTEC) 10 MG tablet Take 1 tablet (10 mg total) by mouth once daily. 30 tablet 4    diphenhydrAMINE (BENADRYL) 25 mg capsule Take 1 each (25 mg total) by mouth every 6 (six) hours as needed for  Allergies (sinus congestion).  0    HYDROcodone-acetaminophen (NORCO) 5-325 mg per tablet Take 1 tablet by mouth every 6 (six) hours as needed for Pain. 12 tablet 0    lisinopriL (ZESTRIL) 2.5 MG tablet Take 1 tablet (2.5 mg total) by mouth once daily. May be taken hs or in am 90 tablet 1    methylPREDNISolone (MEDROL DOSEPACK) 4 mg tablet use as directed 1 each 0    omeprazole (PRILOSEC) 20 MG capsule Take 1 capsule (20 mg total) by mouth daily as needed (heartburn). 30 capsule 3    promethazine-dextromethorphan (PROMETHAZINE-DM) 6.25-15 mg/5 mL Syrp Take 5 mLs by mouth every 6 (six) hours as needed (cough). 118 mL 0     No current facility-administered medications on file prior to visit.

## 2025-06-23 ENCOUNTER — HOSPITAL ENCOUNTER (OUTPATIENT)
Dept: CARDIOLOGY | Facility: HOSPITAL | Age: 35
Discharge: HOME | End: 2025-06-23
Attending: UROLOGY
Payer: MEDICARE

## 2025-06-23 DIAGNOSIS — Z01.818 PRE-OP EXAM: ICD-10-CM

## 2025-06-23 LAB
OHS QRS DURATION: 94 MS
OHS QTC CALCULATION: 412 MS

## 2025-06-23 PROCEDURE — 93010 ELECTROCARDIOGRAM REPORT: CPT | Mod: ,,, | Performed by: INTERNAL MEDICINE

## 2025-06-23 PROCEDURE — 93005 ELECTROCARDIOGRAM TRACING: CPT

## 2025-06-24 ENCOUNTER — RESULTS FOLLOW-UP (OUTPATIENT)
Dept: INFECTIOUS DISEASES | Facility: CLINIC | Age: 35
End: 2025-06-24

## 2025-06-26 NOTE — PRE-PROCEDURE INSTRUCTIONS
Pre op instructions reviewed with Alonzo over telephone, verbalized understanding.    Procedure Date: 6/30/25  Arrival Time:  0730; We will call you after 2pm on Friday 6/27/25 with any changes with your arrival time.    Address:   [facility] (Off [facility], 2nd Building on the left)  [id number] [place] [address] [place] [address]  >>>Please enter through front entrance Lobby of 1st floor to Registration desk<<<       !!!INSTRUCTIONS IMPORTANT!!!  NO FOOD or tobacco products after midnight the night before surgery!     >>>MEDICATION INSTRUCTIONS<<<: Morning of Surgery, take small sip of water with ONLY these medications:  Prilosec  Nasal spray (as needed)    *ADHD Medication: Stop taking ADHD/ADD medications 48 hrs prior to surgery, as this can affect the anesthesia used. Bariatric surgeries must HOLD 7 Days prior to surgery!    *Diabetic/ Prediabetic Patients: !!!If you take diabetic or weight loss medication, Do NOT take morning of surgery unless instructed by Doctor!!!  Metformin to be stopped 24 hrs prior to surgery.   Long Acting Insulin Instructions: HOLD the night before surgery unless instructed differently by Provider!  Ozempic/ Mounjaro/ Wegovy/ Trulicity/ Semaglutide injections or weight loss medication to be stopped 7 days prior to surgery.    !!!STOP ALL Aspirins, NSAIDS, WEIGHT LOSS INJECTIONS/PILLS, Herbal supplements, & Vitamins 7 DAYS BEFORE SURGERY!!!    ____  Avoid Alcoholic beverages 3 days prior to surgery, as it can thin the blood.  ____  NO Acrylic/fake nails or nail polish worn day of surgery (specifically hand/arm & foot surgeries).  ____  NO powder, lotions, deodorants, oils or cream on body.  ____  Remove all jewelry & piercings & foreign objects before arrival & leave at home.  ____  Remove Dentures, Hearing Aids & Contact Lens prior to surgery.  ____  Bring photo ID and insurance information to hospital (Leave Valuables at Home).  ____  If going home the same day, arrange for a  ride home. You will not be able to drive for 24 hrs if Anesthesia was used.   ____  Females (ages 11-60): may need to give a urine sample the morning of surgery; please see Pre op Nurse prior to using the restroom.  ____  Males: Stop ED medications (Viagra, Cialis) 24 hrs prior to surgery.  ____  Wear clean, loose fitting clothing to allow for dressings/ bandages.      Bathing Instructions:    -Shower with anti-bacterial Soap (Hibiclens or Dial) the night before surgery and the morning of.   -Do not use Hibiclens on your face or genitals.   -Apply clean clothes after shower.  -Do not shave your face or body 2 days prior to surgery unless instructed otherwise by your Surgeon.  [facility] Visitor/Ride Policy:  Only 2 adults allowed in pre op/recovery area during your procedure. You MUST HAVE A RIDE HOME from a responsible adult that you know and trust. Medical Transport, Uber or Lyft can ONLY be used if patient has a responsible adult to accompany them during ride home.       *Signs and symptoms of Infection Before or After Surgery:               !!!If you experience any fever, chills, nausea/ vomiting, foul odor/ excessive drainage from surgical site, flu-like symptoms, new wounds or cuts, PLEASE CALL THE SURGEON OFFICE at [phone] or SEND MESSAGE THROUGH [employer] PORTAL!!!     *If you are running late the morning of surgery, please call the Hospital Surgery Dept @ [phone].     *Billing questions:  [phone]     Thank you,  -[facility] Pre Admit Dept.  [phone] or [phone]  M-F 7:30 am-4:00 pm (Closed Major Holidays)

## 2025-06-27 NOTE — PRE ADMISSION SCREENING
Spoke with Dr. Lopez regarding pt's tooth abscess. Pt currently on medications to help resolve abscess. Pt medication therapy should be completed by Sunday, June 29. No new orders obtained. Dr. Evans also made aware. Both Dr. Lopez and Nathan will proceed with surgery as planned.

## 2025-06-30 ENCOUNTER — HOSPITAL ENCOUNTER (OUTPATIENT)
Facility: HOSPITAL | Age: 35
Discharge: HOME | End: 2025-06-30
Attending: UROLOGY | Admitting: UROLOGY
Payer: MEDICARE

## 2025-06-30 VITALS
RESPIRATION RATE: 19 BRPM | OXYGEN SATURATION: 99 % | WEIGHT: 169.75 LBS | HEART RATE: 77 BPM | HEIGHT: 69 IN | DIASTOLIC BLOOD PRESSURE: 61 MMHG | SYSTOLIC BLOOD PRESSURE: 117 MMHG | TEMPERATURE: 98 F | BODY MASS INDEX: 25.14 KG/M2

## 2025-06-30 DIAGNOSIS — N47.1 PHIMOSIS: Primary | ICD-10-CM

## 2025-06-30 PROCEDURE — 36000706: Performed by: UROLOGY

## 2025-06-30 PROCEDURE — 25000003 PHARM REV CODE 250: Performed by: NURSE ANESTHETIST, CERTIFIED REGISTERED

## 2025-06-30 PROCEDURE — 71000015 HC POSTOP RECOV 1ST HR: Performed by: UROLOGY

## 2025-06-30 PROCEDURE — 63600175 PHARM REV CODE 636 W HCPCS: Performed by: NURSE ANESTHETIST, CERTIFIED REGISTERED

## 2025-06-30 PROCEDURE — 37000009 HC ANESTHESIA EA ADD 15 MINS: Performed by: UROLOGY

## 2025-06-30 PROCEDURE — 54161 CIRCUM 28 DAYS OR OLDER: CPT | Mod: ,,, | Performed by: UROLOGY

## 2025-06-30 PROCEDURE — 88304 TISSUE EXAM BY PATHOLOGIST: CPT | Mod: TC | Performed by: UROLOGY

## 2025-06-30 PROCEDURE — 71000033 HC RECOVERY, INTIAL HOUR: Performed by: UROLOGY

## 2025-06-30 PROCEDURE — 36000707: Performed by: UROLOGY

## 2025-06-30 PROCEDURE — 63600175 PHARM REV CODE 636 W HCPCS: Performed by: UROLOGY

## 2025-06-30 PROCEDURE — 37000008 HC ANESTHESIA 1ST 15 MINUTES: Performed by: UROLOGY

## 2025-06-30 RX ORDER — CLINDAMYCIN PHOSPHATE 900 MG/50ML
900 INJECTION, SOLUTION INTRAVENOUS
Status: COMPLETED | OUTPATIENT
Start: 2025-06-30 | End: 2025-06-30

## 2025-06-30 RX ORDER — OXYCODONE HYDROCHLORIDE 5 MG/1
5 TABLET ORAL EVERY 4 HOURS PRN
Qty: 30 TABLET | Refills: 0 | Status: SHIPPED | OUTPATIENT
Start: 2025-06-30

## 2025-06-30 RX ORDER — OXYCODONE AND ACETAMINOPHEN 5; 325 MG/1; MG/1
1 TABLET ORAL
Status: DISCONTINUED | OUTPATIENT
Start: 2025-06-30 | End: 2025-06-30 | Stop reason: HOSPADM

## 2025-06-30 RX ORDER — FENTANYL CITRATE 50 UG/ML
INJECTION, SOLUTION INTRAMUSCULAR; INTRAVENOUS
Status: DISCONTINUED | OUTPATIENT
Start: 2025-06-30 | End: 2025-06-30

## 2025-06-30 RX ORDER — SUCCINYLCHOLINE CHLORIDE 20 MG/ML
INJECTION INTRAMUSCULAR; INTRAVENOUS
Status: DISCONTINUED | OUTPATIENT
Start: 2025-06-30 | End: 2025-06-30

## 2025-06-30 RX ORDER — LIDOCAINE HYDROCHLORIDE 20 MG/ML
INJECTION INTRAVENOUS
Status: DISCONTINUED | OUTPATIENT
Start: 2025-06-30 | End: 2025-06-30

## 2025-06-30 RX ORDER — ROCURONIUM BROMIDE 10 MG/ML
INJECTION, SOLUTION INTRAVENOUS
Status: DISCONTINUED | OUTPATIENT
Start: 2025-06-30 | End: 2025-06-30

## 2025-06-30 RX ORDER — LIDOCAINE HYDROCHLORIDE 10 MG/ML
INJECTION, SOLUTION INFILTRATION; PERINEURAL
Status: DISCONTINUED | OUTPATIENT
Start: 2025-06-30 | End: 2025-06-30 | Stop reason: HOSPADM

## 2025-06-30 RX ORDER — HYDROMORPHONE HYDROCHLORIDE 2 MG/ML
0.2 INJECTION, SOLUTION INTRAMUSCULAR; INTRAVENOUS; SUBCUTANEOUS EVERY 5 MIN PRN
Status: DISCONTINUED | OUTPATIENT
Start: 2025-06-30 | End: 2025-06-30 | Stop reason: HOSPADM

## 2025-06-30 RX ORDER — KETOROLAC TROMETHAMINE 30 MG/ML
INJECTION, SOLUTION INTRAMUSCULAR; INTRAVENOUS
Status: DISCONTINUED | OUTPATIENT
Start: 2025-06-30 | End: 2025-06-30

## 2025-06-30 RX ORDER — ONDANSETRON HYDROCHLORIDE 2 MG/ML
4 INJECTION, SOLUTION INTRAVENOUS DAILY PRN
Status: DISCONTINUED | OUTPATIENT
Start: 2025-06-30 | End: 2025-06-30 | Stop reason: HOSPADM

## 2025-06-30 RX ORDER — PROPOFOL 10 MG/ML
VIAL (ML) INTRAVENOUS
Status: DISCONTINUED | OUTPATIENT
Start: 2025-06-30 | End: 2025-06-30

## 2025-06-30 RX ORDER — DEXAMETHASONE SODIUM PHOSPHATE 4 MG/ML
INJECTION, SOLUTION INTRA-ARTICULAR; INTRALESIONAL; INTRAMUSCULAR; INTRAVENOUS; SOFT TISSUE
Status: DISCONTINUED | OUTPATIENT
Start: 2025-06-30 | End: 2025-06-30

## 2025-06-30 RX ORDER — KETOROLAC TROMETHAMINE 30 MG/ML
15 INJECTION, SOLUTION INTRAMUSCULAR; INTRAVENOUS EVERY 8 HOURS PRN
Status: DISCONTINUED | OUTPATIENT
Start: 2025-06-30 | End: 2025-06-30 | Stop reason: HOSPADM

## 2025-06-30 RX ORDER — BUPIVACAINE HYDROCHLORIDE 2.5 MG/ML
INJECTION, SOLUTION EPIDURAL; INFILTRATION; INTRACAUDAL; PERINEURAL
Status: DISCONTINUED | OUTPATIENT
Start: 2025-06-30 | End: 2025-06-30 | Stop reason: HOSPADM

## 2025-06-30 RX ORDER — MIDAZOLAM HYDROCHLORIDE 1 MG/ML
INJECTION INTRAMUSCULAR; INTRAVENOUS
Status: DISCONTINUED | OUTPATIENT
Start: 2025-06-30 | End: 2025-06-30

## 2025-06-30 RX ORDER — ONDANSETRON HYDROCHLORIDE 2 MG/ML
INJECTION, SOLUTION INTRAVENOUS
Status: DISCONTINUED | OUTPATIENT
Start: 2025-06-30 | End: 2025-06-30

## 2025-06-30 RX ADMIN — MIDAZOLAM HYDROCHLORIDE 2 MG: 1 INJECTION, SOLUTION INTRAMUSCULAR; INTRAVENOUS at 10:06

## 2025-06-30 RX ADMIN — SODIUM CHLORIDE, SODIUM LACTATE, POTASSIUM CHLORIDE, AND CALCIUM CHLORIDE: .6; .31; .03; .02 INJECTION, SOLUTION INTRAVENOUS at 10:06

## 2025-06-30 RX ADMIN — SUGAMMADEX 200 MG: 100 INJECTION, SOLUTION INTRAVENOUS at 11:06

## 2025-06-30 RX ADMIN — KETOROLAC TROMETHAMINE 30 MG: 30 INJECTION, SOLUTION INTRAMUSCULAR; INTRAVENOUS at 11:06

## 2025-06-30 RX ADMIN — FENTANYL CITRATE 50 MCG: 50 INJECTION, SOLUTION INTRAMUSCULAR; INTRAVENOUS at 10:06

## 2025-06-30 RX ADMIN — ONDANSETRON 4 MG: 2 INJECTION INTRAMUSCULAR; INTRAVENOUS at 10:06

## 2025-06-30 RX ADMIN — ROCURONIUM BROMIDE 35 MG: 10 INJECTION, SOLUTION INTRAVENOUS at 10:06

## 2025-06-30 RX ADMIN — SUCCINYLCHOLINE CHLORIDE 100 MG: 20 INJECTION, SOLUTION INTRAMUSCULAR; INTRAVENOUS; PARENTERAL at 10:06

## 2025-06-30 RX ADMIN — LIDOCAINE HYDROCHLORIDE 50 MG: 20 INJECTION INTRAVENOUS at 10:06

## 2025-06-30 RX ADMIN — ROCURONIUM BROMIDE 5 MG: 10 INJECTION, SOLUTION INTRAVENOUS at 10:06

## 2025-06-30 RX ADMIN — PROPOFOL 150 MG: 10 INJECTION, EMULSION INTRAVENOUS at 10:06

## 2025-06-30 RX ADMIN — DEXAMETHASONE SODIUM PHOSPHATE 4 MG: 4 INJECTION, SOLUTION INTRA-ARTICULAR; INTRALESIONAL; INTRAMUSCULAR; INTRAVENOUS; SOFT TISSUE at 10:06

## 2025-06-30 RX ADMIN — CLINDAMYCIN PHOSPHATE 900 MG: 900 INJECTION, SOLUTION INTRAVENOUS at 10:06

## 2025-06-30 NOTE — H&P
Chief Complaint:  Phimosis     HPI:   Alonzo Rajput is a 34 y.o. male that presents today as a referral from Dr. Roberts for phimosis.  Patient notes phimosis for quite some time but recently in December he began having splitting of the skin of his penis which has been causing him pain.  It happens when he has an erection.  No voiding issues but when he urinates and urine gets into the abrasion, it is very uncomfortable.  No gross hematuria or dysuria.  No ED. no prior urologic procedures.        PMH:       Past Medical History:   Diagnosis Date    Allergy      Bronchitis      Cancer       lymphoma    Candida esophagitis      Diffuse large B cell lymphoma      DLBCL (diffuse large B cell lymphoma) 11/5/2018    Ear infection      Encounter for blood transfusion      Fever blister      Hepatitis B      HIV (human immunodeficiency virus infection)      UGI bleed           PSH:        Past Surgical History:   Procedure Laterality Date    BONE MARROW BIOPSY Right 4/25/2019     Procedure: Biopsy-bone marrow;  Surgeon: Sarahi Garcia MD;  Location: [address];  Service: Orthopedics;  Laterality: Right;    ESOPHAGOGASTRODUODENOSCOPY        INSERTION OF TUNNELED CENTRAL VENOUS CATHETER (CVC) WITH SUBCUTANEOUS PORT Left 9/13/2018     Procedure: [id number];  Surgeon: Luis Bogran-Reyes, MD;  Location: [facility];  Service: General;  Laterality: Left;    LYMPH NODE BIOPSY Left 9/13/2018     Procedure: BIOPSY, LYMPH NODE;  Surgeon: Luis Bogran-Reyes, MD;  Location: [facility];  Service: General;  Laterality: Left;  inguinal lymph node excisional biopsy    port a cath removal        PORTACATH PLACEMENT        UPPER GASTROINTESTINAL ENDOSCOPY             Family History:         Family History   Problem Relation Name Age of Onset    No Known Problems Mother        No Known Problems Father        No Known Problems Sister        No Known Problems Brother        Lupus Sister        No Known Problems Sister        No Known  Problems Brother        No Known Problems Brother             Social History:  Social History   Social History            Tobacco Use    Smoking status: Former       Current packs/day: 0.00       Types: Cigarettes       Start date: 10/2/2013       Quit date: 10/2/2015       Years since quittin.3    Smokeless tobacco: Never   Substance Use Topics    Alcohol use: Yes       Alcohol/week: 0.0 standard drinks of alcohol       Comment: rarely    Drug use: Yes       Frequency: 4.0 times per week       Types: Marijuana            Review of Systems:  General: No fever, chills  Skin: No rashes  Chest:  Denies cough and sputum production  Heart: Denies chest pain  Resp: Denies dyspnea  Abdomen: Denies diarrhea, abdominal pain, hematemesis, or blood in stool.  Musculoskeletal: No joint stiffness or swelling. Denies back pain.  : see HPI  Neuro: no dizziness or weakness     Allergies:  Amoxicillin     Medications:    Current Medications      Current Outpatient Medications:     abacavir-dolutegravir-lamivud (TRIUMEQ) 600- mg Tab, Take 1 tablet by mouth once daily., Disp: 30 tablet, Rfl: 5    azelastine (ASTELIN) 137 mcg (0.1 %) nasal spray, 1 spray (137 mcg total) by Nasal route 2 (two) times daily., Disp: 30 mL, Rfl: 0    cetirizine (ZYRTEC) 10 MG tablet, Take 1 tablet (10 mg total) by mouth once daily., Disp: 30 tablet, Rfl: 4    diphenhydrAMINE (BENADRYL) 25 mg capsule, Take 1 each (25 mg total) by mouth every 6 (six) hours as needed for Allergies (sinus congestion)., Disp: , Rfl: 0    HYDROcodone-acetaminophen (NORCO) 5-325 mg per tablet, Take 1 tablet by mouth every 6 (six) hours as needed for Pain., Disp: 12 tablet, Rfl: 0    methylPREDNISolone (MEDROL DOSEPACK) 4 mg tablet, use as directed, Disp: 1 each, Rfl: 0    promethazine-dextromethorphan (PROMETHAZINE-DM) 6.25-15 mg/5 mL Syrp, Take 5 mLs by mouth every 6 (six) hours as needed (cough)., Disp: 118 mL, Rfl: 0    lisinopriL (ZESTRIL) 2.5 MG tablet, Take 1  tablet (2.5 mg total) by mouth once daily. May be taken hs or in am, Disp: 90 tablet, Rfl: 1    omeprazole (PRILOSEC) 20 MG capsule, Take 1 capsule (20 mg total) by mouth daily as needed (heartburn)., Disp: 30 capsule, Rfl: 3        Physical Exam:      Vitals:     02/04/25 1525   BP: 126/86   Pulse: 72      Body mass index is 26.37 kg/m².  General: awake, alert, cooperative  Head: NC/AT  Ears: external ears normal  Eyes: sclera normal  Lungs: normal inspiration, NAD  Heart: well-perfused  Abdomen: Soft, NT, ND  : uncirc'd phallus, severe phimosis noted, meatus normal in size and position and able to be visualized, but the remainder of the glans could not be visualized but is palpably normal, BL testicles palpable, no masses, nontender, no abnormalities of epididymi  Lymphatic: groin nodes negative  Skin: The skin is warm and dry  Ext: No c/c/e.  Neuro: grossly intact, AOx3     RADIOLOGY:  No recent relevant imaging available for review.     LABS:  I personally reviewed the following lab values:        Lab Results   Component Value Date     WBC 5.09 01/15/2025     HGB 14.2 01/15/2025     HCT 43.3 01/15/2025      01/15/2025      01/15/2025     K 3.6 01/15/2025      01/15/2025     CREATININE 1.2 01/15/2025     BUN 13 01/15/2025     CO2 26 01/15/2025     TSH 1.136 09/23/2015     INR 1.1 11/05/2018     HGBA1C 5.2 11/29/2018     CHOL 157 03/14/2019     TRIG 263 (H) 03/14/2019     HDL 35 (L) 03/14/2019     ALT 24 01/15/2025     AST 21 01/15/2025      Assessment/Plan:   Alonzo Rajput is a 34 y.o. male with:     Phimosis - to OR for circumcision, risks/benefits/alternatives reviewed     Bry Evans MD  Urology

## 2025-06-30 NOTE — DISCHARGE SUMMARY
[facility] - Surgery (Hospital)  Discharge Note  Short Stay    Procedure(s) (LRB):  CIRCUMCISION (N/A)      OUTCOME: Patient tolerated treatment/procedure well without complication and is now ready for discharge.    DISPOSITION: Home or Self Care    FINAL DIAGNOSIS:  Phimosis    FOLLOWUP: In clinic    DISCHARGE INSTRUCTIONS:    Discharge Procedure Orders   Diet Adult Regular     Notify your health care provider if you experience any of the following:  temperature >100.4     Notify your health care provider if you experience any of the following:  persistent nausea and vomiting or diarrhea     Notify your health care provider if you experience any of the following:  severe uncontrolled pain     Notify your health care provider if you experience any of the following:  redness, tenderness, or signs of infection (pain, swelling, redness, odor or green/yellow discharge around incision site)     Notify your health care provider if you experience any of the following:  difficulty breathing or increased cough     Notify your health care provider if you experience any of the following:  severe persistent headache     Notify your health care provider if you experience any of the following:  worsening rash     Notify your health care provider if you experience any of the following:  persistent dizziness, light-headedness, or visual disturbances     Notify your health care provider if you experience any of the following:  increased confusion or weakness     Remove dressing in 24 hours     Activity as tolerated        
(0) independent

## 2025-06-30 NOTE — ANESTHESIA PREPROCEDURE EVALUATION
06/30/2025  Alonzo Rajput is a 34 y.o., male.    Past Medical History:   Diagnosis Date    Allergy     Bronchitis     Cancer     lymphoma    Candida esophagitis     Diffuse large B cell lymphoma     DLBCL (diffuse large B cell lymphoma) 11/5/2018    Ear infection     Encounter for blood transfusion     Fever blister     Hepatitis B     HIV (human immunodeficiency virus infection)     UGI bleed      Past Surgical History:   Procedure Laterality Date    BONE MARROW BIOPSY Right 4/25/2019    Procedure: Biopsy-bone marrow;  Surgeon: Sarahi Garcia MD;  Location: [address];  Service: Orthopedics;  Laterality: Right;    ESOPHAGOGASTRODUODENOSCOPY      INSERTION OF TUNNELED CENTRAL VENOUS CATHETER (CVC) WITH SUBCUTANEOUS PORT Left 9/13/2018    Procedure: [id number]-CATH;  Surgeon: Luis Bogran-Reyes, MD;  Location: [facility];  Service: General;  Laterality: Left;    LYMPH NODE BIOPSY Left 9/13/2018    Procedure: BIOPSY, LYMPH NODE;  Surgeon: Luis Bogran-Reyes, MD;  Location: [facility];  Service: General;  Laterality: Left;  inguinal lymph node excisional biopsy    port a cath removal      PORTACATH PLACEMENT      UPPER GASTROINTESTINAL ENDOSCOPY         Pre-op Assessment    I have reviewed the Patient Summary Reports.    I have reviewed the NPO Status.   I have reviewed the Medications.     Review of Systems  Anesthesia Hx:  No problems with previous Anesthesia                Social:  Non-Smoker       Hematology/Oncology:  Hematology Normal                  Hematology Comments: HIV                 Oncology Comments: Diffuse large B cell lymphoma.  In remission.     Cardiovascular:  Cardiovascular Normal                                              Pulmonary:         Hasn't ever taken anything for asthma.  Was never told he had it.               Renal/:  Renal/ Normal                  Hepatic/GI:       Hepatitis, B S/p treatment.             Neurological:  Neurology Normal                                      Endocrine:  Endocrine Normal                Physical Exam  General: Well nourished    Airway:  Mallampati: II   Mouth Opening: Normal  TM Distance: Normal  Neck ROM: Normal ROM    Dental:  Intact        Anesthesia Plan  Type of Anesthesia, risks & benefits discussed:    Anesthesia Type: Gen ETT, Gen Supraglottic Airway  Intra-op Monitoring Plan: Standard ASA Monitors  Post Op Pain Control Plan: multimodal analgesia  Induction:  IV  Airway Plan: , Post-Induction  Informed Consent: Informed consent signed with the Patient and all parties understand the risks and agree with anesthesia plan.  All questions answered.   ASA Score: 2    Ready For Surgery From Anesthesia Perspective.     .      Chemistry        Component Value Date/Time     06/23/2025 1452     (L) 02/04/2025 1617    K 3.8 06/23/2025 1452    K 3.8 02/04/2025 1617     06/23/2025 1452    CL 99 02/04/2025 1617    CO2 26 06/23/2025 1452    CO2 26 02/04/2025 1617    BUN 19 06/23/2025 1452    CREATININE 1.3 06/23/2025 1452    GLU 77 06/23/2025 1452    GLU 75 02/04/2025 1617        Component Value Date/Time    CALCIUM 9.5 06/23/2025 1452    CALCIUM 9.0 02/04/2025 1617    ALKPHOS 89 06/23/2025 1452    ALKPHOS 92 02/04/2025 1617    AST 37 06/23/2025 1452    AST 26 02/04/2025 1617    ALT 38 06/23/2025 1452    ALT 29 02/04/2025 1617    BILITOT 0.5 06/23/2025 1452    BILITOT 0.6 02/04/2025 1617    ESTGFRAFRICA >60.0 05/26/2022 1205    EGFRNONAA >60.0 05/26/2022 1205        Lab Results   Component Value Date    WBC 5.67 06/23/2025    HGB 14.0 06/23/2025    HCT 42.7 06/23/2025    MCV 94 06/23/2025     06/23/2025       Echo 9/28/18:  CONCLUSIONS     1 - Normal left ventricular systolic function (EF 55-60%).     2 - No wall motion abnormalities.     3 - Normal right ventricular systolic function .     4 - Normal left  ventricular diastolic function.

## 2025-06-30 NOTE — OP NOTE
[facility]  Operative Note    Date: 06/30/2025    Pre-Op Diagnosis: phimosis    Post-Op Diagnosis: same    Procedure(s) Performed:   1.  Circumcision     Specimen(s): foreskin    Staff Surgeon: Bry Evans MD    Assistant Surgeon: none    Anesthesia: General endotracheal anesthesia    Indications: Alonzo Rajput is a 34 y.o. male with phimosis who presents for a circumcision.      Findings: severe phimosis, foreskin excised, excellent hemostasis and cosmesis at the end of the case    Estimated Blood Loss: 5mL    Drains: none    Procedure in detail:  After risks, benefits and possible complications of circumcision were discussed, the patient elected to under go the procedure and informed consent was obtained.  All questions were answered in the pre-operative area. The patient was transferred to the operative suite and placed in supine position.  SCDs were applied and working.  After adequate anesthesia the patient was prepped and draped in the usual sterile fashion. Time out was preformed, radha-procedural antibiotic were confirmed.     A marking pen was used to shama our incisions, at the coronal sulcus with the foreskin in the normal anatomic position and 1 cm proximal to the glans in the retracted position.  A 15 blade was used to sharply incise our marked lines.  The foreskin was reduced and removed with electrocautery by connecting the two incisions we just made. The free edges were then reapproximated in a running fashion using a 4-0 moncryl.     A sterile dressing was applied.  The patient tolerated the procedure well and was transferred to the PACU in stable condition    Disposition: The patient will follow up with me in 4-6 weeks.  He was given prescriptions for oxycodone and instructed to avoid sex/masturbation x 6 weeks.      Bry Evans MD

## 2025-06-30 NOTE — NURSING
Pt transferred to discharge area via stretcher with personal belongings (cell phone and eye glass case) with discharge medicine at .

## 2025-07-01 ENCOUNTER — PATIENT MESSAGE (OUTPATIENT)
Dept: UROLOGY | Facility: CLINIC | Age: 35
End: 2025-07-01
Payer: MEDICARE

## 2025-07-01 NOTE — ANESTHESIA POSTPROCEDURE EVALUATION
Anesthesia Post Evaluation    Patient: Alonzo Rajput    Procedure(s) Performed: Procedure(s) (LRB):  CIRCUMCISION (N/A)    Final Anesthesia Type: general      Patient location during evaluation: PACU  Patient participation: Yes- Able to Participate  Level of consciousness: awake and alert  Post-procedure vital signs: reviewed and stable  Pain management: adequate  Airway patency: patent  YENY mitigation strategies: Verification of full reversal of neuromuscular block  PONV status at discharge: No PONV  Anesthetic complications: no      Cardiovascular status: hemodynamically stable  Respiratory status: spontaneous ventilation  Hydration status: euvolemic  Follow-up not needed.              Vitals Value Taken Time   /61 06/30/25 12:14   Temp 36.7 °C (98 °F) 06/30/25 12:14   Pulse 77 06/30/25 12:14   Resp 19 06/30/25 12:14   SpO2 99 % 06/30/25 12:14         Event Time   Out of Recovery 12:15:52         Pain/Breanna Score: Breanna Score: 10 (6/30/2025 12:17 PM)

## 2025-07-02 LAB
ESTROGEN SERPL-MCNC: NORMAL PG/ML
INSULIN SERPL-ACNC: NORMAL U[IU]/ML
LAB AP CLINICAL INFORMATION: NORMAL
LAB AP DIAGNOSIS CATEGORY: NORMAL
LAB AP GROSS DESCRIPTION: NORMAL
LAB AP PERFORMING LOCATION(S): NORMAL
LAB AP REPORT FOOTNOTES: NORMAL

## 2025-07-07 ENCOUNTER — PATIENT MESSAGE (OUTPATIENT)
Dept: UROLOGY | Facility: CLINIC | Age: 35
End: 2025-07-07
Payer: MEDICARE

## 2025-07-08 ENCOUNTER — LAB VISIT (OUTPATIENT)
Dept: LAB | Facility: HOSPITAL | Age: 35
End: 2025-07-08
Attending: UROLOGY
Payer: MEDICARE

## 2025-07-08 ENCOUNTER — TELEPHONE (OUTPATIENT)
Dept: UROLOGY | Facility: CLINIC | Age: 35
End: 2025-07-08
Payer: MEDICARE

## 2025-07-08 DIAGNOSIS — N39.0 RECURRENT UTI: ICD-10-CM

## 2025-07-08 DIAGNOSIS — S30.812A ABRASION OF PENIS, INITIAL ENCOUNTER: Primary | ICD-10-CM

## 2025-07-08 PROCEDURE — 87077 CULTURE AEROBIC IDENTIFY: CPT

## 2025-07-10 LAB — BACTERIA UR CULT: ABNORMAL

## 2025-07-27 DIAGNOSIS — B20 AIDS: ICD-10-CM

## 2025-07-28 RX ORDER — ABACAVIR SULFATE, DOLUTEGRAVIR SODIUM, LAMIVUDINE 600; 50; 300 MG/1; MG/1; MG/1
1 TABLET, FILM COATED ORAL
Qty: 30 TABLET | Refills: 5 | Status: SHIPPED | OUTPATIENT
Start: 2025-07-28

## 2025-08-06 ENCOUNTER — PATIENT MESSAGE (OUTPATIENT)
Dept: INFECTIOUS DISEASES | Facility: CLINIC | Age: 35
End: 2025-08-06

## 2025-08-06 ENCOUNTER — OFFICE VISIT (OUTPATIENT)
Dept: INFECTIOUS DISEASES | Facility: CLINIC | Age: 35
End: 2025-08-06
Payer: MEDICARE

## 2025-08-06 DIAGNOSIS — B20 CURRENTLY ASYMPTOMATIC HIV INFECTION, WITH HISTORY OF HIV-RELATED ILLNESS: Primary | ICD-10-CM

## 2025-08-06 NOTE — ASSESSMENT & PLAN NOTE
The viral load remains undetectable and the CD4 count remains steady .  He has good virologic and immunologic control of HIV.   Continue Triumeq

## 2025-08-06 NOTE — PROGRESS NOTES
HIV Follow-up Visit  Alonzo Rajput is here for follow-up of HIV infection. He is feeling better since his last visit.     Patient is compliant with HIV medications.  Patient does not have side effects with the HIV medications.none    The following portions of the patient's history were reviewed and updated as appropriate: allergies, current medications, past family history, past medical history, past social history, past surgical history, and problem list.    History of Present Illness           He reports compliance with treatment. He is on Triumueq.  HIV labs-  06/23- HIV viral load undetected    Review of Systems   Constitutional:  Negative for chills, diaphoresis, fever, malaise/fatigue and weight loss.   Cardiovascular:  Negative for chest pain and palpitations.   Neurological:  Negative for dizziness and headaches.          Physical Exam  Vitals and nursing note reviewed.   HENT:      Head: Normocephalic.   Cardiovascular:      Rate and Rhythm: Normal rate.   Pulmonary:      Effort: Pulmonary effort is normal.   Skin:     General: Skin is warm.   Neurological:      Mental Status: He is alert.          Immunization History   Administered Date(s) Administered    COVID-19, MRNA, LN-S, PF (Pfizer) (Purple Cap) 08/23/2021, 09/16/2021    DTaP 1989, 03/19/1990, 02/05/1992, 08/19/1992, 06/01/1995    HIB 02/05/1992    HPV 9-Valent 12/11/2023    Hepatitis B, Pediatric/Adolescent 06/01/1995, 06/29/1995, 07/01/2008    IPV 1989, 03/19/1990, 02/05/1992, 06/01/1995    Influenza 11/22/2005, 11/08/2006    Influenza - Quadrivalent - PF *Preferred* (6 months and older) 11/22/2005, 11/08/2006, 10/02/2015, 09/30/2016, 11/20/2017, 10/30/2019, 11/04/2020, 10/27/2021, 01/23/2023    MMR 02/05/1992, 06/01/1995    Meningococcal Conjugate (MCV4P) 11/08/2006    PPD Test 10/02/2015, 04/07/2016    Pneumococcal Conjugate - 13 Valent 10/02/2015    Pneumococcal Polysaccharide - 23 Valent 03/31/2016, 06/02/2021    Td - PF  (ADULT) 09/30/2016    Tdap 11/08/2006         Assessment:  Problem List[1]     The patient has been counseled regarding the importance of compliance with every dose of HIV medications. Possible side effects have been reviewed and the patients questions have been answered.    Assessment & Plan           1. Currently asymptomatic HIV infection, with history of HIV-related illness  Assessment & Plan:  The viral load remains undetectable and the CD4 count remains steady .  He has good virologic and immunologic control of HIV.   Continue Triumeq                This note was generated with the assistance of ambient listening technology. Verbal consent was obtained by the patient and accompanying visitor(s) for the recording of patient appointment to facilitate this note. I attest to having reviewed and edited the generated note for accuracy, though some syntax or spelling errors may persist. Please contact the author of this note for any clarification.   The patient location is: [place]  The chief complaint leading to consultation is: HIV follow up    Visit type: audiovisual    Face to Face time with patient: 5 mins  10 minutes of total time spent on the encounter, which includes face to face time and non-face to face time preparing to see the patient (eg, review of tests), Obtaining and/or reviewing separately obtained history, Documenting clinical information in the electronic or other health record, Independently interpreting results (not separately reported) and communicating results to the patient/family/caregiver, or Care coordination (not separately reported).         Each patient to whom he or she provides medical services by telemedicine is:  (1) informed of the relationship between the physician and patient and the respective role of any other health care provider with respect to management of the patient; and (2) notified that he or she may decline to receive medical services by telemedicine and may withdraw  from such care at any time.    Notes:                                [1]   Patient Active Problem List  Diagnosis    History of hepatitis B    Diffuse large B cell lymphoma    History of syphilis    Chronic otitis externa of right ear    Insomnia    Proteinuria    Mild intermittent asthma without complication    Seasonal allergic rhinitis    Diffuse large B-cell lymphoma    Currently asymptomatic HIV infection, with history of HIV-related illness    DLBCL (diffuse large B cell lymphoma)    Dermatitis    BMI 26.0-26.9,adult    History of lymphoma    Bone infarct of distal femur, right    Decreased range of motion of left ankle    Sprain of left ankle    SHEREEN (acute kidney injury)    Penile pain    Other drug-induced neutropenia    Phimosis

## 2025-09-05 ENCOUNTER — PATIENT OUTREACH (OUTPATIENT)
Dept: ADMINISTRATIVE | Facility: HOSPITAL | Age: 35
End: 2025-09-05
Payer: MEDICARE

## (undated) DEVICE — ELECTRODE REM PLYHSV RETURN 9

## (undated) DEVICE — SPONGE COTTON TRAY 4X4IN

## (undated) DEVICE — SYR 10CC LUER LOCK

## (undated) DEVICE — TRAY BONE MARROW BEK3411

## (undated) DEVICE — SUT MONOCRYL 4.0 PS2 CP496G

## (undated) DEVICE — Device

## (undated) DEVICE — TOWEL OR DISP STRL BLUE 4/PK

## (undated) DEVICE — BNDG COFLEX FOAM LF2 ST 3X5YD

## (undated) DEVICE — NDL SPINAL 20GX3.5 HUB

## (undated) DEVICE — BANDAGE CONFORM STRTCH 1IN

## (undated) DEVICE — SEE MEDLINE ITEM 157128

## (undated) DEVICE — NDL ECLIPSE SAF REG 25GX1.5IN

## (undated) DEVICE — DRAPE LAP T SHT W/ INSTR PAD

## (undated) DEVICE — PACK BASIC SETUP SC BR

## (undated) DEVICE — SYR SLIP TIP 10ML SHIELD

## (undated) DEVICE — MANIFOLD 4 PORT

## (undated) DEVICE — GLOVE SIGNATURE ESSNTL LTX 7.5

## (undated) DEVICE — DRESSING LEUKOPLAST FLEX 1X3IN

## (undated) DEVICE — BANDAGE ROLL COTTN 4.5INX4.1YD

## (undated) DEVICE — GOWN POLY REINF X-LONG XL